# Patient Record
Sex: MALE | Race: WHITE | Employment: OTHER | URBAN - METROPOLITAN AREA
[De-identification: names, ages, dates, MRNs, and addresses within clinical notes are randomized per-mention and may not be internally consistent; named-entity substitution may affect disease eponyms.]

---

## 2017-04-24 ENCOUNTER — OFFICE VISIT (OUTPATIENT)
Dept: FAMILY MEDICINE CLINIC | Age: 82
End: 2017-04-24

## 2017-04-24 VITALS
BODY MASS INDEX: 31.76 KG/M2 | RESPIRATION RATE: 16 BRPM | WEIGHT: 202.8 LBS | SYSTOLIC BLOOD PRESSURE: 110 MMHG | DIASTOLIC BLOOD PRESSURE: 80 MMHG | TEMPERATURE: 98 F | HEART RATE: 76 BPM

## 2017-04-24 DIAGNOSIS — D69.6 THROMBOCYTOPENIA (HCC): ICD-10-CM

## 2017-04-24 DIAGNOSIS — G47.30 SLEEP APNEA, UNSPECIFIED TYPE: ICD-10-CM

## 2017-04-24 DIAGNOSIS — E03.9 HYPOTHYROIDISM, UNSPECIFIED TYPE: Chronic | ICD-10-CM

## 2017-04-24 DIAGNOSIS — I25.10 CORONARY ARTERY DISEASE INVOLVING NATIVE CORONARY ARTERY OF NATIVE HEART WITHOUT ANGINA PECTORIS: Chronic | ICD-10-CM

## 2017-04-24 DIAGNOSIS — E34.9 TESTOSTERONE DEFICIENCY: ICD-10-CM

## 2017-04-24 DIAGNOSIS — K21.9 GASTROESOPHAGEAL REFLUX DISEASE, ESOPHAGITIS PRESENCE NOT SPECIFIED: ICD-10-CM

## 2017-04-24 DIAGNOSIS — M47.815 OSTEOARTHRITIS OF THORACOLUMBAR SPINE, UNSPECIFIED SPINAL OSTEOARTHRITIS COMPLICATION STATUS: ICD-10-CM

## 2017-04-24 DIAGNOSIS — I10 ESSENTIAL HYPERTENSION: Primary | ICD-10-CM

## 2017-04-24 DIAGNOSIS — R05.9 COUGH: ICD-10-CM

## 2017-04-24 DIAGNOSIS — R93.89 ABNORMAL CHEST CT: ICD-10-CM

## 2017-04-24 DIAGNOSIS — E78.5 HYPERLIPIDEMIA, UNSPECIFIED HYPERLIPIDEMIA TYPE: ICD-10-CM

## 2017-04-24 PROCEDURE — 99214 OFFICE O/P EST MOD 30 MIN: CPT | Performed by: FAMILY MEDICINE

## 2017-04-24 PROCEDURE — 4040F PNEUMOC VAC/ADMIN/RCVD: CPT | Performed by: FAMILY MEDICINE

## 2017-04-24 PROCEDURE — 1123F ACP DISCUSS/DSCN MKR DOCD: CPT | Performed by: FAMILY MEDICINE

## 2017-04-24 PROCEDURE — 1036F TOBACCO NON-USER: CPT | Performed by: FAMILY MEDICINE

## 2017-04-24 PROCEDURE — G8427 DOCREV CUR MEDS BY ELIG CLIN: HCPCS | Performed by: FAMILY MEDICINE

## 2017-04-24 PROCEDURE — G8417 CALC BMI ABV UP PARAM F/U: HCPCS | Performed by: FAMILY MEDICINE

## 2017-04-24 PROCEDURE — G8598 ASA/ANTIPLAT THER USED: HCPCS | Performed by: FAMILY MEDICINE

## 2017-04-24 RX ORDER — FUROSEMIDE 40 MG/1
20 TABLET ORAL DAILY
COMMUNITY
End: 2019-09-12 | Stop reason: DRUGHIGH

## 2017-04-24 RX ORDER — POTASSIUM CHLORIDE 7.45 MG/ML
10 INJECTION INTRAVENOUS ONCE
COMMUNITY
End: 2020-07-20

## 2017-04-24 ASSESSMENT — ENCOUNTER SYMPTOMS
SHORTNESS OF BREATH: 0
VOMITING: 0
CHEST TIGHTNESS: 0
ANAL BLEEDING: 0
CONSTIPATION: 0
DIARRHEA: 0
NAUSEA: 0
ABDOMINAL PAIN: 0
BLOOD IN STOOL: 0

## 2017-04-25 ENCOUNTER — TELEPHONE (OUTPATIENT)
Dept: FAMILY MEDICINE CLINIC | Age: 82
End: 2017-04-25

## 2017-05-23 DIAGNOSIS — M47.815 OSTEOARTHRITIS OF THORACOLUMBAR SPINE, UNSPECIFIED SPINAL OSTEOARTHRITIS COMPLICATION STATUS: ICD-10-CM

## 2017-05-23 RX ORDER — NAPROXEN 500 MG/1
500 TABLET ORAL DAILY
Qty: 90 TABLET | Refills: 3 | Status: SHIPPED | OUTPATIENT
Start: 2017-05-23 | End: 2018-04-13 | Stop reason: SDUPTHER

## 2017-08-09 ENCOUNTER — TELEPHONE (OUTPATIENT)
Dept: FAMILY MEDICINE CLINIC | Age: 82
End: 2017-08-09

## 2017-09-01 ENCOUNTER — OFFICE VISIT (OUTPATIENT)
Dept: FAMILY MEDICINE CLINIC | Age: 82
End: 2017-09-01
Payer: MEDICARE

## 2017-09-01 VITALS
RESPIRATION RATE: 14 BRPM | TEMPERATURE: 97.8 F | DIASTOLIC BLOOD PRESSURE: 72 MMHG | SYSTOLIC BLOOD PRESSURE: 128 MMHG | HEART RATE: 60 BPM | WEIGHT: 208.2 LBS | BODY MASS INDEX: 32.68 KG/M2 | HEIGHT: 67 IN

## 2017-09-01 DIAGNOSIS — E03.9 HYPOTHYROIDISM, UNSPECIFIED TYPE: Chronic | ICD-10-CM

## 2017-09-01 DIAGNOSIS — E34.9 TESTOSTERONE DEFICIENCY: ICD-10-CM

## 2017-09-01 DIAGNOSIS — E78.5 HYPERLIPIDEMIA, UNSPECIFIED HYPERLIPIDEMIA TYPE: ICD-10-CM

## 2017-09-01 DIAGNOSIS — I10 ESSENTIAL HYPERTENSION: Primary | ICD-10-CM

## 2017-09-01 DIAGNOSIS — K21.9 GASTROESOPHAGEAL REFLUX DISEASE, ESOPHAGITIS PRESENCE NOT SPECIFIED: ICD-10-CM

## 2017-09-01 DIAGNOSIS — R91.8 MASS OF LOWER LOBE OF LEFT LUNG: ICD-10-CM

## 2017-09-01 DIAGNOSIS — I25.10 CORONARY ARTERY DISEASE INVOLVING NATIVE CORONARY ARTERY OF NATIVE HEART WITHOUT ANGINA PECTORIS: Chronic | ICD-10-CM

## 2017-09-01 PROCEDURE — G8417 CALC BMI ABV UP PARAM F/U: HCPCS | Performed by: FAMILY MEDICINE

## 2017-09-01 PROCEDURE — G8427 DOCREV CUR MEDS BY ELIG CLIN: HCPCS | Performed by: FAMILY MEDICINE

## 2017-09-01 PROCEDURE — 99214 OFFICE O/P EST MOD 30 MIN: CPT | Performed by: FAMILY MEDICINE

## 2017-09-01 PROCEDURE — 4040F PNEUMOC VAC/ADMIN/RCVD: CPT | Performed by: FAMILY MEDICINE

## 2017-09-01 PROCEDURE — 1036F TOBACCO NON-USER: CPT | Performed by: FAMILY MEDICINE

## 2017-09-01 PROCEDURE — 1123F ACP DISCUSS/DSCN MKR DOCD: CPT | Performed by: FAMILY MEDICINE

## 2017-09-01 PROCEDURE — G8598 ASA/ANTIPLAT THER USED: HCPCS | Performed by: FAMILY MEDICINE

## 2017-09-01 RX ORDER — OMEPRAZOLE 20 MG/1
20 CAPSULE, DELAYED RELEASE ORAL DAILY
Qty: 90 CAPSULE | Refills: 3 | Status: CANCELLED | OUTPATIENT
Start: 2017-09-01

## 2017-09-01 RX ORDER — ATORVASTATIN CALCIUM 80 MG/1
80 TABLET, FILM COATED ORAL DAILY
COMMUNITY

## 2017-09-01 RX ORDER — LOSARTAN POTASSIUM 50 MG/1
50 TABLET ORAL DAILY
Qty: 90 TABLET | Refills: 3 | Status: CANCELLED | OUTPATIENT
Start: 2017-09-01

## 2017-09-01 ASSESSMENT — ENCOUNTER SYMPTOMS
CONSTIPATION: 0
ABDOMINAL PAIN: 0
VOMITING: 0
ANAL BLEEDING: 0
SHORTNESS OF BREATH: 0
BLOOD IN STOOL: 0
DIARRHEA: 0
CHEST TIGHTNESS: 0
NAUSEA: 0

## 2017-09-06 ENCOUNTER — HOSPITAL ENCOUNTER (OUTPATIENT)
Dept: CT IMAGING | Age: 82
Discharge: HOME | End: 2017-09-06
Payer: MEDICARE

## 2017-09-06 DIAGNOSIS — R91.8 MASS OF LOWER LOBE OF LEFT LUNG: ICD-10-CM

## 2017-09-06 PROCEDURE — 71250 CT THORAX DX C-: CPT

## 2017-09-07 ENCOUNTER — TELEPHONE (OUTPATIENT)
Dept: FAMILY MEDICINE CLINIC | Age: 82
End: 2017-09-07

## 2017-09-07 DIAGNOSIS — R91.8 MASS OF LOWER LOBE OF LEFT LUNG: Primary | ICD-10-CM

## 2017-11-17 ENCOUNTER — HOSPITAL ENCOUNTER (OUTPATIENT)
Dept: CT IMAGING | Age: 82
Discharge: HOME | End: 2017-11-17
Payer: MEDICARE

## 2017-11-17 DIAGNOSIS — R91.8 MASS OF LOWER LOBE OF LEFT LUNG: ICD-10-CM

## 2017-11-17 PROCEDURE — 71250 CT THORAX DX C-: CPT

## 2018-04-13 ENCOUNTER — OFFICE VISIT (OUTPATIENT)
Dept: FAMILY MEDICINE CLINIC | Age: 83
End: 2018-04-13
Payer: MEDICARE

## 2018-04-13 VITALS
DIASTOLIC BLOOD PRESSURE: 80 MMHG | HEART RATE: 52 BPM | BODY MASS INDEX: 30.48 KG/M2 | SYSTOLIC BLOOD PRESSURE: 106 MMHG | TEMPERATURE: 98.3 F | WEIGHT: 194.6 LBS | RESPIRATION RATE: 16 BRPM

## 2018-04-13 DIAGNOSIS — E78.5 HYPERLIPIDEMIA, UNSPECIFIED HYPERLIPIDEMIA TYPE: ICD-10-CM

## 2018-04-13 DIAGNOSIS — R80.9 PROTEINURIA, UNSPECIFIED TYPE: ICD-10-CM

## 2018-04-13 DIAGNOSIS — K21.9 GASTROESOPHAGEAL REFLUX DISEASE, ESOPHAGITIS PRESENCE NOT SPECIFIED: ICD-10-CM

## 2018-04-13 DIAGNOSIS — E03.9 HYPOTHYROIDISM, UNSPECIFIED TYPE: Chronic | ICD-10-CM

## 2018-04-13 DIAGNOSIS — J44.9 CHRONIC OBSTRUCTIVE PULMONARY DISEASE, UNSPECIFIED COPD TYPE (HCC): ICD-10-CM

## 2018-04-13 DIAGNOSIS — I10 ESSENTIAL HYPERTENSION: Primary | ICD-10-CM

## 2018-04-13 DIAGNOSIS — E34.9 TESTOSTERONE DEFICIENCY: ICD-10-CM

## 2018-04-13 DIAGNOSIS — I25.10 CORONARY ARTERY DISEASE INVOLVING NATIVE CORONARY ARTERY OF NATIVE HEART WITHOUT ANGINA PECTORIS: Chronic | ICD-10-CM

## 2018-04-13 DIAGNOSIS — M47.815 OSTEOARTHRITIS OF THORACOLUMBAR SPINE, UNSPECIFIED SPINAL OSTEOARTHRITIS COMPLICATION STATUS: ICD-10-CM

## 2018-04-13 PROCEDURE — 1123F ACP DISCUSS/DSCN MKR DOCD: CPT | Performed by: FAMILY MEDICINE

## 2018-04-13 PROCEDURE — 1036F TOBACCO NON-USER: CPT | Performed by: FAMILY MEDICINE

## 2018-04-13 PROCEDURE — 4040F PNEUMOC VAC/ADMIN/RCVD: CPT | Performed by: FAMILY MEDICINE

## 2018-04-13 PROCEDURE — 99214 OFFICE O/P EST MOD 30 MIN: CPT | Performed by: FAMILY MEDICINE

## 2018-04-13 PROCEDURE — G8417 CALC BMI ABV UP PARAM F/U: HCPCS | Performed by: FAMILY MEDICINE

## 2018-04-13 PROCEDURE — G8926 SPIRO NO PERF OR DOC: HCPCS | Performed by: FAMILY MEDICINE

## 2018-04-13 PROCEDURE — G8427 DOCREV CUR MEDS BY ELIG CLIN: HCPCS | Performed by: FAMILY MEDICINE

## 2018-04-13 PROCEDURE — 3023F SPIROM DOC REV: CPT | Performed by: FAMILY MEDICINE

## 2018-04-13 PROCEDURE — G8598 ASA/ANTIPLAT THER USED: HCPCS | Performed by: FAMILY MEDICINE

## 2018-04-13 RX ORDER — NAPROXEN 500 MG/1
500 TABLET ORAL DAILY
Qty: 90 TABLET | Refills: 3 | Status: SHIPPED | OUTPATIENT
Start: 2018-04-13 | End: 2019-07-25 | Stop reason: SDUPTHER

## 2018-04-13 RX ORDER — LANOLIN ALCOHOL/MO/W.PET/CERES
1000 CREAM (GRAM) TOPICAL EVERY OTHER DAY
COMMUNITY

## 2018-04-13 RX ORDER — ALBUTEROL SULFATE 90 UG/1
2 AEROSOL, METERED RESPIRATORY (INHALATION) EVERY 6 HOURS PRN
COMMUNITY

## 2018-04-13 ASSESSMENT — ENCOUNTER SYMPTOMS
SHORTNESS OF BREATH: 1
ANAL BLEEDING: 0
CONSTIPATION: 0
CHEST TIGHTNESS: 0
ABDOMINAL PAIN: 0
NAUSEA: 0
VOMITING: 0
BLOOD IN STOOL: 0
DIARRHEA: 0

## 2018-04-26 ENCOUNTER — HOSPITAL ENCOUNTER (OUTPATIENT)
Dept: PULMONOLOGY | Age: 83
Discharge: HOME | End: 2018-04-26
Payer: MEDICARE

## 2018-04-26 DIAGNOSIS — J44.9 CHRONIC OBSTRUCTIVE PULMONARY DISEASE, UNSPECIFIED COPD TYPE (HCC): ICD-10-CM

## 2018-04-26 PROCEDURE — 94729 DIFFUSING CAPACITY: CPT

## 2018-04-26 PROCEDURE — 94726 PLETHYSMOGRAPHY LUNG VOLUMES: CPT

## 2018-04-26 PROCEDURE — 94060 EVALUATION OF WHEEZING: CPT

## 2018-05-16 ENCOUNTER — TELEPHONE (OUTPATIENT)
Dept: FAMILY MEDICINE CLINIC | Age: 83
End: 2018-05-16

## 2018-05-31 ENCOUNTER — TELEPHONE (OUTPATIENT)
Dept: FAMILY MEDICINE CLINIC | Age: 83
End: 2018-05-31

## 2018-06-15 ENCOUNTER — OFFICE VISIT (OUTPATIENT)
Dept: PULMONOLOGY | Age: 83
End: 2018-06-15
Payer: MEDICARE

## 2018-06-15 VITALS
TEMPERATURE: 97.7 F | SYSTOLIC BLOOD PRESSURE: 124 MMHG | WEIGHT: 197.8 LBS | BODY MASS INDEX: 31.04 KG/M2 | DIASTOLIC BLOOD PRESSURE: 78 MMHG | HEIGHT: 67 IN | HEART RATE: 70 BPM | OXYGEN SATURATION: 96 %

## 2018-06-15 DIAGNOSIS — Z99.89 OSA ON CPAP: ICD-10-CM

## 2018-06-15 DIAGNOSIS — G47.33 OSA ON CPAP: ICD-10-CM

## 2018-06-15 DIAGNOSIS — J44.9 CHRONIC OBSTRUCTIVE PULMONARY DISEASE, UNSPECIFIED COPD TYPE (HCC): Primary | ICD-10-CM

## 2018-06-15 PROCEDURE — 1036F TOBACCO NON-USER: CPT | Performed by: INTERNAL MEDICINE

## 2018-06-15 PROCEDURE — G8926 SPIRO NO PERF OR DOC: HCPCS | Performed by: INTERNAL MEDICINE

## 2018-06-15 PROCEDURE — 3023F SPIROM DOC REV: CPT | Performed by: INTERNAL MEDICINE

## 2018-06-15 PROCEDURE — G8598 ASA/ANTIPLAT THER USED: HCPCS | Performed by: INTERNAL MEDICINE

## 2018-06-15 PROCEDURE — G8427 DOCREV CUR MEDS BY ELIG CLIN: HCPCS | Performed by: INTERNAL MEDICINE

## 2018-06-15 PROCEDURE — G8417 CALC BMI ABV UP PARAM F/U: HCPCS | Performed by: INTERNAL MEDICINE

## 2018-06-15 PROCEDURE — 99204 OFFICE O/P NEW MOD 45 MIN: CPT | Performed by: INTERNAL MEDICINE

## 2018-06-15 PROCEDURE — 1123F ACP DISCUSS/DSCN MKR DOCD: CPT | Performed by: INTERNAL MEDICINE

## 2018-06-15 PROCEDURE — 4040F PNEUMOC VAC/ADMIN/RCVD: CPT | Performed by: INTERNAL MEDICINE

## 2018-06-15 RX ORDER — IPRATROPIUM BROMIDE AND ALBUTEROL SULFATE 2.5; .5 MG/3ML; MG/3ML
1 SOLUTION RESPIRATORY (INHALATION) EVERY 4 HOURS PRN
COMMUNITY

## 2018-06-15 RX ORDER — BUDESONIDE AND FORMOTEROL FUMARATE DIHYDRATE 160; 4.5 UG/1; UG/1
2 AEROSOL RESPIRATORY (INHALATION) 2 TIMES DAILY
Qty: 1 INHALER | Refills: 3 | Status: SHIPPED | OUTPATIENT
Start: 2018-06-15

## 2018-06-15 ASSESSMENT — ENCOUNTER SYMPTOMS
COLOR CHANGE: 0
SHORTNESS OF BREATH: 1
WHEEZING: 0
VOICE CHANGE: 0
COUGH: 1
BACK PAIN: 1
TROUBLE SWALLOWING: 0
ABDOMINAL PAIN: 0

## 2018-07-27 ENCOUNTER — HOSPITAL ENCOUNTER (OUTPATIENT)
Age: 83
Discharge: HOME | End: 2018-07-27
Payer: MEDICARE

## 2018-07-27 DIAGNOSIS — E78.5 HYPERLIPIDEMIA, UNSPECIFIED HYPERLIPIDEMIA TYPE: ICD-10-CM

## 2018-07-27 DIAGNOSIS — I10 ESSENTIAL HYPERTENSION: ICD-10-CM

## 2018-07-27 LAB
ALBUMIN SERPL-MCNC: 3.9 G/DL (ref 3.5–5.1)
ALP BLD-CCNC: 46 U/L (ref 38–126)
ALT SERPL-CCNC: 21 U/L (ref 11–66)
ANION GAP SERPL CALCULATED.3IONS-SCNC: 13 MEQ/L (ref 8–16)
AST SERPL-CCNC: 24 U/L (ref 5–40)
BILIRUB SERPL-MCNC: 0.7 MG/DL (ref 0.3–1.2)
BUN BLDV-MCNC: 16 MG/DL (ref 7–22)
CALCIUM SERPL-MCNC: 9.2 MG/DL (ref 8.5–10.5)
CHLORIDE BLD-SCNC: 104 MEQ/L (ref 98–111)
CHOLESTEROL, TOTAL: 110 MG/DL (ref 100–199)
CO2: 26 MEQ/L (ref 23–33)
CREAT SERPL-MCNC: 1 MG/DL (ref 0.4–1.2)
GFR SERPL CREATININE-BSD FRML MDRD: 72 ML/MIN/1.73M2
GLUCOSE BLD-MCNC: 87 MG/DL (ref 70–108)
HDLC SERPL-MCNC: 29 MG/DL
LDL CHOLESTEROL CALCULATED: 69 MG/DL
POTASSIUM SERPL-SCNC: 4.1 MEQ/L (ref 3.5–5.2)
SODIUM BLD-SCNC: 143 MEQ/L (ref 135–145)
TOTAL PROTEIN: 6.7 G/DL (ref 6.1–8)
TRIGL SERPL-MCNC: 58 MG/DL (ref 0–199)

## 2018-07-27 PROCEDURE — 80061 LIPID PANEL: CPT

## 2018-07-27 PROCEDURE — 36415 COLL VENOUS BLD VENIPUNCTURE: CPT

## 2018-07-27 PROCEDURE — 80053 COMPREHEN METABOLIC PANEL: CPT

## 2018-07-30 ENCOUNTER — OFFICE VISIT (OUTPATIENT)
Dept: FAMILY MEDICINE CLINIC | Age: 83
End: 2018-07-30
Payer: MEDICARE

## 2018-07-30 VITALS
BODY MASS INDEX: 31.48 KG/M2 | HEART RATE: 64 BPM | WEIGHT: 201 LBS | TEMPERATURE: 98.1 F | DIASTOLIC BLOOD PRESSURE: 70 MMHG | RESPIRATION RATE: 20 BRPM | SYSTOLIC BLOOD PRESSURE: 118 MMHG

## 2018-07-30 DIAGNOSIS — I10 ESSENTIAL HYPERTENSION: Primary | ICD-10-CM

## 2018-07-30 DIAGNOSIS — E03.9 HYPOTHYROIDISM, UNSPECIFIED TYPE: Chronic | ICD-10-CM

## 2018-07-30 DIAGNOSIS — E78.5 HYPERLIPIDEMIA, UNSPECIFIED HYPERLIPIDEMIA TYPE: ICD-10-CM

## 2018-07-30 DIAGNOSIS — I25.10 CORONARY ARTERY DISEASE INVOLVING NATIVE CORONARY ARTERY OF NATIVE HEART WITHOUT ANGINA PECTORIS: Chronic | ICD-10-CM

## 2018-07-30 DIAGNOSIS — K21.9 GASTROESOPHAGEAL REFLUX DISEASE, ESOPHAGITIS PRESENCE NOT SPECIFIED: ICD-10-CM

## 2018-07-30 DIAGNOSIS — E34.9 TESTOSTERONE DEFICIENCY: ICD-10-CM

## 2018-07-30 PROCEDURE — 99214 OFFICE O/P EST MOD 30 MIN: CPT | Performed by: FAMILY MEDICINE

## 2018-07-30 PROCEDURE — G8598 ASA/ANTIPLAT THER USED: HCPCS | Performed by: FAMILY MEDICINE

## 2018-07-30 PROCEDURE — 1101F PT FALLS ASSESS-DOCD LE1/YR: CPT | Performed by: FAMILY MEDICINE

## 2018-07-30 PROCEDURE — 1123F ACP DISCUSS/DSCN MKR DOCD: CPT | Performed by: FAMILY MEDICINE

## 2018-07-30 PROCEDURE — G8417 CALC BMI ABV UP PARAM F/U: HCPCS | Performed by: FAMILY MEDICINE

## 2018-07-30 PROCEDURE — 4040F PNEUMOC VAC/ADMIN/RCVD: CPT | Performed by: FAMILY MEDICINE

## 2018-07-30 PROCEDURE — G8427 DOCREV CUR MEDS BY ELIG CLIN: HCPCS | Performed by: FAMILY MEDICINE

## 2018-07-30 PROCEDURE — 1036F TOBACCO NON-USER: CPT | Performed by: FAMILY MEDICINE

## 2018-07-30 RX ORDER — ACETAMINOPHEN 160 MG
1 TABLET,DISINTEGRATING ORAL DAILY
COMMUNITY

## 2018-07-30 ASSESSMENT — ENCOUNTER SYMPTOMS
CHEST TIGHTNESS: 0
ABDOMINAL PAIN: 0
ANAL BLEEDING: 0
BLOOD IN STOOL: 0
VOMITING: 0
NAUSEA: 0
SHORTNESS OF BREATH: 0
DIARRHEA: 0
CONSTIPATION: 0

## 2018-07-30 ASSESSMENT — PATIENT HEALTH QUESTIONNAIRE - PHQ9
2. FEELING DOWN, DEPRESSED OR HOPELESS: 0
1. LITTLE INTEREST OR PLEASURE IN DOING THINGS: 0
SUM OF ALL RESPONSES TO PHQ9 QUESTIONS 1 & 2: 0
SUM OF ALL RESPONSES TO PHQ QUESTIONS 1-9: 0

## 2018-07-30 NOTE — PROGRESS NOTES
exudate. Eyes: Conjunctivae and EOM are normal. Pupils are equal, round, and reactive to light. Right eye exhibits no discharge. Left eye exhibits no discharge. No scleral icterus. Neck: Normal range of motion. Neck supple. No tracheal deviation present. No thyromegaly present. Cardiovascular: Normal rate, regular rhythm, normal heart sounds and intact distal pulses. Exam reveals no gallop and no friction rub. No murmur heard. Pulmonary/Chest: Effort normal and breath sounds normal. No respiratory distress. He has no wheezes. He has no rales. He exhibits no tenderness. Abdominal: Soft. Bowel sounds are normal. He exhibits no distension and no mass. There is no tenderness. There is no rebound and no guarding. Genitourinary: Testes normal.   Musculoskeletal: Normal range of motion. Lymphadenopathy:     He has no cervical adenopathy. Neurological: He is alert and oriented to person, place, and time. He displays normal reflexes. No cranial nerve deficit. Coordination normal.   Skin: Skin is warm and dry. No rash noted. He is not diaphoretic. No erythema. No pallor. Psychiatric: He has a normal mood and affect. His behavior is normal. Judgment and thought content normal.   Nursing note and vitals reviewed.       Component      Latest Ref Rng & Units 7/27/2018   Glucose      70 - 108 mg/dL 87   Creatinine      0.4 - 1.2 mg/dL 1.0   BUN      7 - 22 mg/dL 16   Sodium      135 - 145 meq/L 143   Potassium      3.5 - 5.2 meq/L 4.1   Chloride      98 - 111 meq/L 104   CO2      23 - 33 meq/L 26   Calcium      8.5 - 10.5 mg/dL 9.2   AST      5 - 40 U/L 24   Alk Phos      38 - 126 U/L 46   Total Protein      6.1 - 8.0 g/dL 6.7   Albumin      3.5 - 5.1 g/dL 3.9   Bilirubin      0.3 - 1.2 mg/dL 0.7   ALT      11 - 66 U/L 21   Cholesterol, Total      100 - 199 mg/dL 110   Triglycerides      0 - 199 mg/dL 58   HDL Cholesterol      mg/dL 29   LDL Calculated      mg/dL 69   Anion Gap      8.0 - 16.0 meq/L 13.0   Est, Glom Filt Rate      ml/min/1.73m2 72 (A)         Lab Results   Component Value Date    TSH 0.04 (L) 08/06/2014    R0OBXEW 142.41 08/06/2014    T4FREE 1.16 (H) 10/02/2015       Lab Results   Component Value Date    WBC 6.6 10/31/2016    HGB 14.8 10/31/2016    HCT 43.6 10/31/2016    MCV 89.8 10/31/2016     10/31/2016         Immunization History   Administered Date(s) Administered    Influenza Virus Vaccine 11/01/2011, 10/01/2012, 10/01/2013, 09/23/2014, 08/26/2015, 10/01/2015, 10/11/2017    Influenza Whole 10/01/2010    Influenza, [place], 3 Years and older, IM 10/13/2016    Pneumococcal 13-valent Conjugate ([id number]) 05/18/2015    Pneumococcal Polysaccharide ([id number]) 10/01/2005    Td 07/20/2012    Tdap (Boostrix, Adacel) 10/13/2016, 04/19/2017    Zoster Live (Zostavax) 04/30/2012       Health Maintenance   Topic Date Due    Shingles Vaccine (1 of 2 - 2 Dose Series) 06/30/2012    TSH testing  08/06/2015    Flu vaccine (1) 09/01/2018    Potassium monitoring  07/27/2019    Creatinine monitoring  07/27/2019    DTaP/Tdap/Td vaccine (2 - Td) 04/19/2027    Pneumococcal low/med risk  Completed       AAA ultrasound (Male, 65-75, smoked ever) indicated at this time? NO  CT Lung Screen (55-80, 30 pk-yrs, smoking or quit <15 years) indicated at this time? No longer needed      Future Appointments  Date Time Provider Glen Broussard   10/1/2018 2:00 PM Onel Galvan, [address]       ASSESSMENT       Diagnosis Orders   1. Essential hypertension     2. Hyperlipidemia, unspecified hyperlipidemia type     3. Coronary artery disease involving native coronary artery of native heart without angina pectoris- (Dr. Shital Araujo)     4. Hypothyroidism, unspecified type     5. Testosterone deficiency (Dr. Abram Alvarez)      6.  Gastroesophageal reflux disease, esophagitis presence not specified         PLAN      Encouraged annual FLU VACCINE after October 1st.    Encouraged NEW SHINGLES SHOT [facility]) - check with insurance re coverage and location of administration (updated 7/30/18)  COLONOSCOPY done 10/17/2014 per Dr. Elijio Jeans- no further mandated. (updated 7/30/2018)  Declines FIT testing at this time (updated 7/30/2018)  DEXA management per [facility]- done 6/17/2016- severe Osteopenia- Follow up October 2018  PSA on hold at this time due to age and fact that PSA has always been very low in past. (updated 7/30/2018)   Evaluated by Dr Cl Biggs on 6/1/18, inhalers added. Follow up with the office 10/1/2018. Restart Calcium Citrate- 1 pill daily as your Fosamax needs this to work correctly. Continue Vitamin D supplementation. [facility] Macy [facility] re Testosterone and Thyroid- [facility]. 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 Roxanne Aldana in October 2018  Continue current medicines. Please discuss need for any further CT's of Lung (due to stable left lung mass) with Pulmonology at your next visit in 10/2018  No refills needed. Follow up in 5/2019 after return form [place].       Electronically signed by Cheyanne Encinas MD on 7/30/2018 at 12:19 PM

## 2018-10-25 PROBLEM — J44.9 COPD (CHRONIC OBSTRUCTIVE PULMONARY DISEASE) (HCC): Status: ACTIVE | Noted: 2017-12-01

## 2018-11-29 ENCOUNTER — HOSPITAL ENCOUNTER (OUTPATIENT)
Dept: GENERAL RADIOLOGY | Age: 83
Discharge: HOME | End: 2018-11-29
Payer: MEDICARE

## 2018-11-29 ENCOUNTER — HOSPITAL ENCOUNTER (OUTPATIENT)
Age: 83
Discharge: HOME | End: 2018-11-29
Payer: MEDICARE

## 2018-11-29 DIAGNOSIS — Z01.811 PRE-OP CHEST EXAM: ICD-10-CM

## 2018-11-29 LAB
ANION GAP SERPL CALCULATED.3IONS-SCNC: 11 MEQ/L (ref 8–16)
BUN BLDV-MCNC: 14 MG/DL (ref 7–22)
CALCIUM SERPL-MCNC: 9.5 MG/DL (ref 8.5–10.5)
CHLORIDE BLD-SCNC: 102 MEQ/L (ref 98–111)
CO2: 30 MEQ/L (ref 23–33)
CREAT SERPL-MCNC: 0.9 MG/DL (ref 0.4–1.2)
EKG ATRIAL RATE: 60 BPM
EKG P AXIS: 48 DEGREES
EKG P-R INTERVAL: 174 MS
EKG Q-T INTERVAL: 420 MS
EKG QRS DURATION: 128 MS
EKG QTC CALCULATION (BAZETT): 420 MS
EKG R AXIS: -16 DEGREES
EKG VENTRICULAR RATE: 60 BPM
ERYTHROCYTE [DISTWIDTH] IN BLOOD BY AUTOMATED COUNT: 12.3 % (ref 11.5–14.5)
ERYTHROCYTE [DISTWIDTH] IN BLOOD BY AUTOMATED COUNT: 42.5 FL (ref 35–45)
GFR SERPL CREATININE-BSD FRML MDRD: 81 ML/MIN/1.73M2
GLUCOSE BLD-MCNC: 75 MG/DL (ref 70–108)
HCT VFR BLD CALC: 41.9 % (ref 42–52)
HEMOGLOBIN: 13.9 GM/DL (ref 14–18)
MCH RBC QN AUTO: 31.4 PG (ref 26–33)
MCHC RBC AUTO-ENTMCNC: 33.2 GM/DL (ref 32.2–35.5)
MCV RBC AUTO: 94.6 FL (ref 80–94)
PLATELET # BLD: 159 THOU/MM3 (ref 130–400)
PMV BLD AUTO: 11 FL (ref 9.4–12.4)
POTASSIUM SERPL-SCNC: 3.9 MEQ/L (ref 3.5–5.2)
RBC # BLD: 4.43 MILL/MM3 (ref 4.7–6.1)
SODIUM BLD-SCNC: 143 MEQ/L (ref 135–145)
WBC # BLD: 5.9 THOU/MM3 (ref 4.8–10.8)

## 2018-11-29 PROCEDURE — 36415 COLL VENOUS BLD VENIPUNCTURE: CPT

## 2018-11-29 PROCEDURE — 71046 X-RAY EXAM CHEST 2 VIEWS: CPT

## 2018-11-29 PROCEDURE — 93010 ELECTROCARDIOGRAM REPORT: CPT | Performed by: INTERNAL MEDICINE

## 2018-11-29 PROCEDURE — 93005 ELECTROCARDIOGRAM TRACING: CPT | Performed by: PHYSICIAN ASSISTANT

## 2018-11-29 PROCEDURE — 80048 BASIC METABOLIC PNL TOTAL CA: CPT

## 2018-11-29 PROCEDURE — 85027 COMPLETE CBC AUTOMATED: CPT

## 2018-12-13 ENCOUNTER — ANESTHESIA (OUTPATIENT)
Dept: OPERATING ROOM | Age: 83
End: 2018-12-13
Payer: MEDICARE

## 2018-12-13 ENCOUNTER — HOSPITAL ENCOUNTER (OUTPATIENT)
Age: 83
Setting detail: OUTPATIENT SURGERY
Discharge: HOME | End: 2018-12-13
Attending: SPECIALIST | Admitting: SPECIALIST
Payer: MEDICARE

## 2018-12-13 ENCOUNTER — ANESTHESIA EVENT (OUTPATIENT)
Dept: OPERATING ROOM | Age: 83
End: 2018-12-13
Payer: MEDICARE

## 2018-12-13 VITALS
RESPIRATION RATE: 15 BRPM | SYSTOLIC BLOOD PRESSURE: 108 MMHG | DIASTOLIC BLOOD PRESSURE: 58 MMHG | OXYGEN SATURATION: 93 %

## 2018-12-13 VITALS
WEIGHT: 196 LBS | RESPIRATION RATE: 16 BRPM | TEMPERATURE: 97.5 F | DIASTOLIC BLOOD PRESSURE: 69 MMHG | SYSTOLIC BLOOD PRESSURE: 151 MMHG | BODY MASS INDEX: 30.76 KG/M2 | HEART RATE: 51 BPM | OXYGEN SATURATION: 94 % | HEIGHT: 67 IN

## 2018-12-13 PROCEDURE — 2500000003 HC RX 250 WO HCPCS: Performed by: SPECIALIST

## 2018-12-13 PROCEDURE — 6360000002 HC RX W HCPCS: Performed by: NURSE ANESTHETIST, CERTIFIED REGISTERED

## 2018-12-13 PROCEDURE — 7100000010 HC PHASE II RECOVERY - FIRST 15 MIN: Performed by: SPECIALIST

## 2018-12-13 PROCEDURE — 3600000002 HC SURGERY LEVEL 2 BASE: Performed by: SPECIALIST

## 2018-12-13 PROCEDURE — 3700000000 HC ANESTHESIA ATTENDED CARE: Performed by: SPECIALIST

## 2018-12-13 PROCEDURE — 2720000010 HC SURG SUPPLY STERILE: Performed by: SPECIALIST

## 2018-12-13 PROCEDURE — 2709999900 HC NON-CHARGEABLE SUPPLY: Performed by: SPECIALIST

## 2018-12-13 PROCEDURE — 3600000012 HC SURGERY LEVEL 2 ADDTL 15MIN: Performed by: SPECIALIST

## 2018-12-13 PROCEDURE — 2580000003 HC RX 258: Performed by: NURSE ANESTHETIST, CERTIFIED REGISTERED

## 2018-12-13 PROCEDURE — 7100000011 HC PHASE II RECOVERY - ADDTL 15 MIN: Performed by: SPECIALIST

## 2018-12-13 PROCEDURE — 3700000001 HC ADD 15 MINUTES (ANESTHESIA): Performed by: SPECIALIST

## 2018-12-13 RX ORDER — SODIUM CHLORIDE 9 MG/ML
INJECTION, SOLUTION INTRAVENOUS CONTINUOUS PRN
Status: DISCONTINUED | OUTPATIENT
Start: 2018-12-13 | End: 2018-12-13 | Stop reason: SDUPTHER

## 2018-12-13 RX ORDER — CEFAZOLIN SODIUM 1 G/50ML
1 INJECTION, SOLUTION INTRAVENOUS
Status: DISCONTINUED | OUTPATIENT
Start: 2018-12-13 | End: 2018-12-13 | Stop reason: HOSPADM

## 2018-12-13 RX ORDER — SODIUM CHLORIDE 9 MG/ML
INJECTION, SOLUTION INTRAVENOUS CONTINUOUS
Status: DISCONTINUED | OUTPATIENT
Start: 2018-12-13 | End: 2018-12-13 | Stop reason: HOSPADM

## 2018-12-13 RX ORDER — PROPOFOL 10 MG/ML
INJECTION, EMULSION INTRAVENOUS PRN
Status: DISCONTINUED | OUTPATIENT
Start: 2018-12-13 | End: 2018-12-13 | Stop reason: SDUPTHER

## 2018-12-13 RX ORDER — LIDOCAINE HYDROCHLORIDE AND EPINEPHRINE 10; 10 MG/ML; UG/ML
INJECTION, SOLUTION INFILTRATION; PERINEURAL PRN
Status: DISCONTINUED | OUTPATIENT
Start: 2018-12-13 | End: 2018-12-13 | Stop reason: HOSPADM

## 2018-12-13 RX ORDER — FENTANYL CITRATE 50 UG/ML
INJECTION, SOLUTION INTRAMUSCULAR; INTRAVENOUS PRN
Status: DISCONTINUED | OUTPATIENT
Start: 2018-12-13 | End: 2018-12-13 | Stop reason: SDUPTHER

## 2018-12-13 RX ORDER — CEFAZOLIN SODIUM 1 G/3ML
INJECTION, POWDER, FOR SOLUTION INTRAMUSCULAR; INTRAVENOUS PRN
Status: DISCONTINUED | OUTPATIENT
Start: 2018-12-13 | End: 2018-12-13 | Stop reason: SDUPTHER

## 2018-12-13 RX ADMIN — SODIUM CHLORIDE: 9 INJECTION, SOLUTION INTRAVENOUS at 12:36

## 2018-12-13 RX ADMIN — PROPOFOL 10 MG: 10 INJECTION, EMULSION INTRAVENOUS at 12:57

## 2018-12-13 RX ADMIN — PROPOFOL 40 MG: 10 INJECTION, EMULSION INTRAVENOUS at 12:40

## 2018-12-13 RX ADMIN — PROPOFOL 10 MG: 10 INJECTION, EMULSION INTRAVENOUS at 12:51

## 2018-12-13 RX ADMIN — FENTANYL CITRATE 50 MCG: 50 INJECTION INTRAMUSCULAR; INTRAVENOUS at 12:40

## 2018-12-13 RX ADMIN — PROPOFOL 10 MG: 10 INJECTION, EMULSION INTRAVENOUS at 12:44

## 2018-12-13 RX ADMIN — PROPOFOL 10 MG: 10 INJECTION, EMULSION INTRAVENOUS at 12:48

## 2018-12-13 RX ADMIN — CEFAZOLIN 1000 MG: 1 INJECTION, POWDER, FOR SOLUTION INTRAMUSCULAR; INTRAVENOUS; PARENTERAL at 12:42

## 2018-12-13 ASSESSMENT — PULMONARY FUNCTION TESTS
PIF_VALUE: 0

## 2018-12-13 ASSESSMENT — PAIN DESCRIPTION - DESCRIPTORS: DESCRIPTORS: ACHING

## 2018-12-13 ASSESSMENT — PAIN - FUNCTIONAL ASSESSMENT: PAIN_FUNCTIONAL_ASSESSMENT: 0-10

## 2018-12-13 ASSESSMENT — PAIN SCALES - GENERAL: PAINLEVEL_OUTOF10: 0

## 2018-12-13 NOTE — ANESTHESIA PRE PROCEDURE
196 lb (88.9 kg)   07/30/18 201 lb (91.2 kg)   06/15/18 197 lb 12.8 oz (89.7 kg)     Body mass index is 30.7 kg/m². CBC:   Lab Results   Component Value Date    WBC 5.9 11/29/2018    RBC 4.43 11/29/2018    HGB 13.9 11/29/2018    HCT 41.9 11/29/2018    MCV 94.6 11/29/2018    RDW 13.2 10/31/2016     11/29/2018       CMP:   Lab Results   Component Value Date     11/29/2018    K 3.9 11/29/2018     11/29/2018    CO2 30 11/29/2018    BUN 14 11/29/2018    CREATININE 0.9 11/29/2018    AGRATIO 1.9 10/01/2018    LABGLOM 81 11/29/2018    GLUCOSE 75 11/29/2018    GLUCOSE 85 10/01/2018    PROT 6.3 10/01/2018    CALCIUM 9.5 11/29/2018    BILITOT 1.3 10/01/2018    ALKPHOS 41 10/01/2018    AST 26 10/01/2018    ALT 24 10/01/2018       POC Tests: No results for input(s): POCGLU, POCNA, POCK, POCCL, POCBUN, POCHEMO, POCHCT in the last 72 hours. Coags:   Lab Results   Component Value Date    PROTIME 12.4 10/31/2016    INR 1.08 10/31/2016       HCG (If Applicable): No results found for: PREGTESTUR, PREGSERUM, HCG, HCGQUANT     ABGs: No results found for: PHART, PO2ART, JYJ2BEW, BZE8UCO, BEART, C0ZOWIFA     Type & Screen (If Applicable):  No results found for: LABABO, LABRH    Anesthesia Evaluation    Airway: Mallampati: II  TM distance: >3 FB   Neck ROM: full  Mouth opening: > = 3 FB Dental:          Pulmonary: breath sounds clear to auscultation  (+) COPD:  sleep apnea:                             Cardiovascular:    (+) hypertension:, CAD:,         Rhythm: regular                      Neuro/Psych:               GI/Hepatic/Renal:   (+) GERD:, liver disease:,           Endo/Other:    (+) hypothyroidism::., .                 Abdominal:   (+) obese,         Vascular:                                        Anesthesia Plan      MAC     ASA 4       Induction: intravenous. Anesthetic plan and risks discussed with patient and spouse. Plan discussed with CRNA.                   Nathan Marcial MD 12/13/2018

## 2019-06-07 ENCOUNTER — OFFICE VISIT (OUTPATIENT)
Dept: FAMILY MEDICINE CLINIC | Age: 84
End: 2019-06-07
Payer: MEDICARE

## 2019-06-07 VITALS
RESPIRATION RATE: 14 BRPM | HEART RATE: 72 BPM | BODY MASS INDEX: 30.35 KG/M2 | SYSTOLIC BLOOD PRESSURE: 116 MMHG | DIASTOLIC BLOOD PRESSURE: 64 MMHG | WEIGHT: 193.8 LBS | TEMPERATURE: 97.9 F

## 2019-06-07 DIAGNOSIS — E03.9 HYPOTHYROIDISM, UNSPECIFIED TYPE: ICD-10-CM

## 2019-06-07 DIAGNOSIS — G47.30 SLEEP APNEA, UNSPECIFIED TYPE: ICD-10-CM

## 2019-06-07 DIAGNOSIS — M47.815 OSTEOARTHRITIS OF THORACOLUMBAR SPINE, UNSPECIFIED SPINAL OSTEOARTHRITIS COMPLICATION STATUS: ICD-10-CM

## 2019-06-07 DIAGNOSIS — I25.10 CORONARY ARTERY DISEASE INVOLVING NATIVE CORONARY ARTERY OF NATIVE HEART WITHOUT ANGINA PECTORIS: ICD-10-CM

## 2019-06-07 DIAGNOSIS — E78.5 HYPERLIPIDEMIA, UNSPECIFIED HYPERLIPIDEMIA TYPE: ICD-10-CM

## 2019-06-07 DIAGNOSIS — I10 ESSENTIAL HYPERTENSION: Primary | ICD-10-CM

## 2019-06-07 DIAGNOSIS — J44.9 CHRONIC OBSTRUCTIVE PULMONARY DISEASE, UNSPECIFIED COPD TYPE (HCC): ICD-10-CM

## 2019-06-07 DIAGNOSIS — E34.9 TESTOSTERONE DEFICIENCY: ICD-10-CM

## 2019-06-07 DIAGNOSIS — Z00.00 ROUTINE GENERAL MEDICAL EXAMINATION AT A HEALTH CARE FACILITY: ICD-10-CM

## 2019-06-07 PROCEDURE — 3023F SPIROM DOC REV: CPT | Performed by: FAMILY MEDICINE

## 2019-06-07 PROCEDURE — G8417 CALC BMI ABV UP PARAM F/U: HCPCS | Performed by: FAMILY MEDICINE

## 2019-06-07 PROCEDURE — 1123F ACP DISCUSS/DSCN MKR DOCD: CPT | Performed by: FAMILY MEDICINE

## 2019-06-07 PROCEDURE — G8926 SPIRO NO PERF OR DOC: HCPCS | Performed by: FAMILY MEDICINE

## 2019-06-07 PROCEDURE — 1036F TOBACCO NON-USER: CPT | Performed by: FAMILY MEDICINE

## 2019-06-07 PROCEDURE — G8427 DOCREV CUR MEDS BY ELIG CLIN: HCPCS | Performed by: FAMILY MEDICINE

## 2019-06-07 PROCEDURE — G0438 PPPS, INITIAL VISIT: HCPCS | Performed by: FAMILY MEDICINE

## 2019-06-07 PROCEDURE — G8598 ASA/ANTIPLAT THER USED: HCPCS | Performed by: FAMILY MEDICINE

## 2019-06-07 PROCEDURE — 99213 OFFICE O/P EST LOW 20 MIN: CPT | Performed by: FAMILY MEDICINE

## 2019-06-07 PROCEDURE — 4040F PNEUMOC VAC/ADMIN/RCVD: CPT | Performed by: FAMILY MEDICINE

## 2019-06-07 ASSESSMENT — LIFESTYLE VARIABLES
HAVE YOU OR SOMEONE ELSE BEEN INJURED AS A RESULT OF YOUR DRINKING: 0
HOW OFTEN DO YOU HAVE SIX OR MORE DRINKS ON ONE OCCASION: 0
HOW OFTEN DURING THE LAST YEAR HAVE YOU NEEDED AN ALCOHOLIC DRINK FIRST THING IN THE MORNING TO GET YOURSELF GOING AFTER A NIGHT OF HEAVY DRINKING: 0
AUDIT-C TOTAL SCORE: 1
HOW OFTEN DURING THE LAST YEAR HAVE YOU BEEN UNABLE TO REMEMBER WHAT HAPPENED THE NIGHT BEFORE BECAUSE YOU HAD BEEN DRINKING: 0
HOW OFTEN DURING THE LAST YEAR HAVE YOU HAD A FEELING OF GUILT OR REMORSE AFTER DRINKING: 0
HOW MANY STANDARD DRINKS CONTAINING ALCOHOL DO YOU HAVE ON A TYPICAL DAY: 0
HAS A RELATIVE, FRIEND, DOCTOR, OR ANOTHER HEALTH PROFESSIONAL EXPRESSED CONCERN ABOUT YOUR DRINKING OR SUGGESTED YOU CUT DOWN: 0
AUDIT TOTAL SCORE: 1
HOW OFTEN DURING THE LAST YEAR HAVE YOU FAILED TO DO WHAT WAS NORMALLY EXPECTED FROM YOU BECAUSE OF DRINKING: 0
HOW OFTEN DURING THE LAST YEAR HAVE YOU FOUND THAT YOU WERE NOT ABLE TO STOP DRINKING ONCE YOU HAD STARTED: 0
HOW OFTEN DO YOU HAVE A DRINK CONTAINING ALCOHOL: 1

## 2019-06-07 ASSESSMENT — ANXIETY QUESTIONNAIRES: GAD7 TOTAL SCORE: 0

## 2019-06-07 ASSESSMENT — ENCOUNTER SYMPTOMS
ANAL BLEEDING: 0
DIARRHEA: 1
VOMITING: 0
CHEST TIGHTNESS: 0
ABDOMINAL PAIN: 0
NAUSEA: 0
BLOOD IN STOOL: 0
CONSTIPATION: 0
SHORTNESS OF BREATH: 1

## 2019-06-07 ASSESSMENT — PATIENT HEALTH QUESTIONNAIRE - PHQ9
SUM OF ALL RESPONSES TO PHQ QUESTIONS 1-9: 0
SUM OF ALL RESPONSES TO PHQ QUESTIONS 1-9: 0

## 2019-06-07 NOTE — PROGRESS NOTES
OBJECTIVE     /64   Pulse 72   Temp 97.9 °F (36.6 °C) (Oral)   Resp 14   Wt 193 lb 12.8 oz (87.9 kg)   BMI 30.35 kg/m²     Wt Readings from Last 3 Encounters:   06/07/19 193 lb 12.8 oz (87.9 kg)   12/13/18 196 lb (88.9 kg)   07/30/18 201 lb (91.2 kg)     Body mass index is 30.35 kg/m². Physical Exam   Constitutional: He is oriented to person, place, and time. He appears well-developed and well-nourished. HENT:   Head: Normocephalic and atraumatic. Right Ear: External ear normal.   Left Ear: External ear normal.   Nose: Nose normal.   Mouth/Throat: Oropharynx is clear and moist.   Eyes: Pupils are equal, round, and reactive to light. Conjunctivae and EOM are normal.   Neck: Normal range of motion. Neck supple. Cardiovascular: Normal rate, regular rhythm and intact distal pulses. Occasional extrasystoles are present. Pulmonary/Chest: Effort normal and breath sounds normal.   Abdominal: Soft. Bowel sounds are normal.   Musculoskeletal: Normal range of motion. Neurological: He is alert and oriented to person, place, and time. He has normal reflexes. Skin: Skin is warm and dry. Psychiatric: He has a normal mood and affect. His behavior is normal. Judgment and thought content normal.   Nursing note and vitals reviewed.     Component      Latest Ref Rng & Units 6/6/2019 6/3/2019 5/13/2019   WBC      4.4 - 10.5 th/cmm  5.1    RBC      4.50 - 6.00 mil/cmm  4.47 (L)    Hemoglobin Quant      13.5 - 16.5 gm/dL  13.8    Hematocrit      40.0 - 49.0 %  42.0    MCV      80 - 97 CU SAMRA  93.9    MCH      27.5 - 33.0 PG  30.9    MCHC      33.0 - 36.0 gm/dL  32.9 (L)    RDW      12.0 - 16.0 %  12.7    Platelet Count      051 - 400 th/cmm  143 (L)    Neutrophils %      40 - 70 %  45.0    Lymphocyte %      15 - 45 %  39.9    Monocytes %      2 - 10 %  9.6    Eosinophils %      0 - 6 %  4.4    Basophils %      0 - 2 %  1.1    Nucleated Red Blood Cells      <1 /100 WBC  0.1    Absolute Neut #      1800 - 7700 /cmm  2300    Absolute Lymph #      1000 - 4800 /cmm  2000    Absolute Mono #      0 - 800 /cmm  500    Absolute Eos #      0 - 500 /cmm  200    Basophils #      0 - 200 /cmm  100    Sodium      135 - 145 mEq/L  140 139   Potassium      3.6 - 5.0 mEq/L  4.1 3.7   Chloride      101 - 111 mEq/L  103 106   CO2      21 - 32 mEq/L  26 27   Anion Gap      4 - 12  11 6   Glucose      70 - 110 mg/dL  92 84   BUN      7 - 20 mg/dL  13 15   Creatinine      0.70 - 1.30 mg/dL  0.95 0.90   Creatinine Clearance        >60 >60   AST      15 - 41 IU/L  24 25   Alk Phos      41 - 137 IU/L  42 42   Bilirubin      0.2 - 1.0 mg/dL  1.0 1.2 (H)   Calcium      8.8 - 10.5 mg/dL  8.9 9.0   Albumin      3.5 - 5.0 gm/dL  4.1 4.0   Total Protein      6.2 - 8.0 g/dL  7.1 7.0   Albumin/Globulin Ratio      1.5 - 2.5  1.4 (L) 1.3 (L)   ALT      10 - 40 IU/L  23 23   LDL Direct      mg/dl   89   Cholesterol      <200 mg/dL   111   Triglycerides      <150 mg/dL   100   HDL Cholesterol      mg/dL   35 (L)   CHOLESTEROL/HDL RELATIVE RISK      4.0 - 5.0   3.1 (L)   Direct-LDL / HDL Risk      <3.1   2.5   VLDL      <39 mg/dL   20   Blood Type       A Negative     Ab Scrn       NEGATIVE     Magnesium      1.8 - 2.5 mg/dL   2.0       Lab Results   Component Value Date    TSH 0.04 (L) 08/06/2014    [id number] 142.41 08/06/2014    T4FREE 1.16 (H) 10/02/2015       Lab Results   Component Value Date    WBC 5.1 06/03/2019    HGB 13.8 06/03/2019    HCT 42.0 06/03/2019    MCV 93.9 06/03/2019     (L) 06/03/2019       Lab Results   Component Value Date    PSA 0.13 07/13/2015    PSA 0.17 08/06/2014    PSA 0.17 06/17/2013        Immunization History   Administered Date(s) Administered    Influenza Virus Vaccine 11/01/2011, 10/01/2012, 10/01/2013, 09/23/2014, 08/26/2015, 10/01/2015, 10/11/2017    Influenza Whole 10/01/2010    Influenza, High Dose (Fluzone 65 yrs and older) 10/09/2018    Influenza, [place], 3 Years and older, IM (Fluzone 3 yrs and older or Afluria 5 yrs and older) 10/13/2016    Pneumococcal 13-valent Conjugate (Pxfzhrm13) 05/18/2015    Pneumococcal Polysaccharide (Mfkyzmvvn37) 10/01/2005    Td, unspecified formulation 07/20/2012    Tdap (Boostrix, Adacel) 10/13/2016, 04/19/2017    Zoster Live (Zostavax) 04/30/2012       Health Maintenance   Topic Date Due    Shingles Vaccine (2 of 3) 06/25/2012    TSH testing  08/06/2015    Potassium monitoring  06/03/2020    Creatinine monitoring  06/03/2020    DTaP/Tdap/Td vaccine (3 - Td) 04/19/2027    Flu vaccine  Completed    Pneumococcal 65+ years Vaccine  Completed         AAA ultrasound (Male, 65-75, smoked ever) indicated at this time? NO  CT Lung Screen (55-80, 30 pk-yrs, smoking or quit <15 years) indicated at this time? No longer needed, due to age      No future appointments. ASSESSMENT       Diagnosis Orders   1. Essential hypertension     2. Hyperlipidemia, unspecified hyperlipidemia type     3. Hypothyroidism, unspecified type     4. Coronary artery disease involving native coronary artery of native heart without angina pectoris- (Dr. Poly Weinberg)     5. Chronic obstructive pulmonary disease, unspecified COPD type ([facility] [address]     6. Testosterone deficiency (Dr. Jess Rodriguez)      7. Osteoarthritis of thoracolumbar spine, unspecified spinal osteoarthritis complication status     8. Sleep apnea, unspecified type         PLAN      Encouraged NEW SHINGLES SHOT [facility]) - check with your local pharmacy. COLONOSCOPY done 10/17/2014 per Dr. Panda Crane- no further mandated. (updated 6/7/2019 )   Declines FIT testing at this time (updated 6/7/2019)  DEXA management per [place] clinic- done 6/17/2016- severe Osteopenia- pt to check on date of last DEXA SCAN and have faxed to our office.    PSA on hold at this time due to age and fact that PSA has always been very low in past. (updated 6/7/2019)  Will call and schedule appt with Dr. Trell Ansari (Pulmology) as pt has seen in past- Please discuss need for any further CT's of Lung (due to stable left lung mass) with Pulmonology at your next visit    Please check with Dr. Elvin Galindo office re last Thyroid Studies completed and 8# weight loss over past 12 months. 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 Ottoniel Springer as scheduled   Continue current medicines. No refills needed. Follow up in 3 months. Electronically signed by Sb Moon MD on 2019 at 12:09 PM                                                             Medicare Annual Wellness Visit  Name: Isabella Kimble Date: 2019   MRN: [id number] Sex: Male   Age: 81 y.o. Ethnicity: Non-/Non    : 1935 Race: Elisa Gilliam is here for Follow-up (pt doing well overall, did have a heart cath done yesterday with [place] 20% blockage,did complain of headache lastnight,  unexplained weight loss, some diarrhea for several days, )    Screenings for behavioral, psychosocial and functional/safety risks, and cognitive dysfunction are all negative except as indicated below. These results, as well as other patient data from the [address] form, are documented in Flowsheets linked to this Encounter. Allergies   Allergen Reactions    Lisinopril Other (See Comments)     Cough      Iv Dye [Iodides] Rash     Prior to Visit Medications    Medication Sig Taking?  Authorizing Provider   Cholecalciferol (VITAMIN D3) 2000 units CAPS Take 1 capsule by mouth daily Yes Historical Provider, MD   vitamin B-12 (CYANOCOBALAMIN) 1000 MCG tablet Take 1,000 mcg by mouth every other day Yes Historical Provider, MD   atorvastatin (LIPITOR) 80 MG tablet Take 80 mg by mouth daily Yes Historical Provider, MD   furosemide (LASIX) 40 MG tablet Take 20 mg by mouth daily Indications: [facility] Dr. Ottoniel Springer  Yes Historical Provider, MD   potassium chloride 10 MEQ/100ML Take 10 mEq by mouth once Indications: 10 meq [facility] Dr. Mars Cabrera  Yes Historical Provider, MD   Testosterone (ANDRODERM) 4 MG/24HR PT24 Place 2 mg onto the skin daily. Yes Historical Provider, MD   levothyroxine (SYNTHROID) 75 MCG tablet Take 75 mcg by mouth Daily Yes Historical Provider, MD   alendronate (FOSAMAX) 70 MG tablet Take 70 mg by mouth every 7 days Take every Wed. Yes Historical Provider, MD   metoprolol (LOPRESSOR) 25 MG tablet Take 1 tablet by mouth daily Yes Myesha Li MD   losartan (COZAAR) 50 MG tablet Take 1 tablet by mouth daily Yes Myesha Li MD   isosorbide mononitrate (IMDUR) 60 MG CR tablet Take 60 mg by mouth daily.  Yes Historical Provider, MD   aspirin 81 MG EC tablet   Take 81 mg by mouth daily Last dose 5-7-2015 for surgery 5- Yes Historical Provider, MD   vitamin E 100 UNIT capsule   Take 400 Units by mouth 2 times daily Last dose 5-7-2015 for surgery 5- Yes Historical Provider, MD   ipratropium-albuterol (DUONEB) 0.5-2.5 (3) MG/3ML SOLN nebulizer solution Inhale 1 vial into the lungs every 4 hours as needed   Historical Provider, MD   budesonide-formoterol (SYMBICORT) 160-4.5 MCG/ACT AERO Inhale 2 puffs into the lungs 2 times daily  Read Kanner, MD   sodium chloride (OCEAN) 0.65 % nasal spray 1 spray by Nasal route 2 times daily as needed for Congestion  Historical Provider, MD   albuterol sulfate HFA (PROAIR HFA) 108 (90 Base) MCG/ACT inhaler Inhale 2 puffs into the lungs every 6 hours as needed for Wheezing  Historical Provider, MD   naproxen (NAPROSYN) 500 MG tablet Take 1 tablet by mouth daily  Myesha Li MD     Past Medical History:   Diagnosis Date    BCC (basal cell carcinoma), face     Nasal- Dr. Kiersten Alexander [facility]    CAD (coronary artery disease)     [facility]    dx'd in [place] GERD (gastroesophageal reflux disease)     GERD    Hyperlipidemia     Hypertension     Hypothyroidism     Irregular heart beat     LISA (nonalcoholic steatohepatitis)     OA (osteoarthritis)     Sleep apnea     uses cpap    Testosterone deficiency      Past Surgical History:   Procedure Laterality Date    CARDIAC CATHETERIZATION  10/04/2010    CARDIAC CATHETERIZATION  2001    stent x1    COLONOSCOPY      last one 2010    EYE SURGERY  2012    bilateral cataracts    JOINT REPLACEMENT  1995    right knee    JOINT REPLACEMENT  1998    left knee    JOINT REPLACEMENT  2014 - aug.    right shoulder    MOHS SURGERY  4/13    Dr. Serafin Wilder  05/14/2015    Right side of scalp     MOHS SURGERY N/A 12/13/2018    MOHS REPAIR BCC DORSAL NOSE performed by Latoya Leblanc MD at [address] [place] [address]  2009    TUMOR REMOVED    GA SONO GUIDE NEEDLE BIOPSY  06/2016    Benign, done in [facility] Right 8/8/14    Dr. Clinton Yusuf - total replacement   [facility] Iliana Rashid and Dr. Laxmi Estrella      as a child   [address]?? History reviewed. No pertinent family history. CareTeam (Including outside providers/suppliers regularly involved in providing care):   Patient Care Team:  Frieda Hays MD as PCP - General (Family Medicine)  Frieda Hays MD as PCP - REHABILITATION [facility] Empaneled Provider    Wt Readings from Last 3 Encounters:   06/07/19 193 lb 12.8 oz (87.9 kg)   12/13/18 196 lb (88.9 kg)   07/30/18 201 lb (91.2 kg)     Vitals:    06/07/19 1040   BP: 116/64   Pulse: 72   Resp: 14   Temp: 97.9 °F (36.6 °C)   TempSrc: Oral   Weight: 193 lb 12.8 oz (87.9 kg)     Body mass index is 30.35 kg/m². Based upon direct observation of the patient, evaluation of cognition reveals recent and remote memory intact.     General Appearance: alert and oriented to person, place and time, well developed and well- nourished, in no acute distress  Skin: warm and dry, no rash or erythema  Head: normocephalic and atraumatic  Eyes: pupils equal, round, and reactive to light, extraocular eye movements intact, conjunctivae normal  ENT: tympanic membrane, external ear and ear canal normal bilaterally, nose without deformity, nasal mucosa and turbinates normal without polyps  Neck: supple and non-tender without mass, no thyromegaly or thyroid nodules, no cervical lymphadenopathy  Pulmonary/Chest: clear to auscultation bilaterally- no wheezes, rales or rhonchi, normal air movement, no respiratory distress  Cardiovascular: normal rate, regular rhythm, normal S1 and S2, no murmurs, rubs, clicks, or gallops, distal pulses intact, no carotid bruits  Abdomen: soft, non-tender, non-distended, normal bowel sounds, no masses or organomegaly  Extremities: no cyanosis, clubbing or edema  Musculoskeletal: normal range of motion, no joint swelling, deformity or tenderness  Neurologic: reflexes normal and symmetric, no cranial nerve deficit, gait, coordination and speech normal    Patient's complete Health Risk Assessment and screening values have been reviewed and are found in Flowsheets. The following problems were reviewed today and where indicated follow up appointments were made and/or referrals ordered. Positive Risk Factor Screenings with Interventions:     Health Habits/Nutrition:  Health Habits/Nutrition  Do you exercise for at least 20 minutes 2-3 times per week?: (!) No  Have you lost any weight without trying in the past 3 months?: (!) Yes  Do you eat fewer than 2 meals per day?: No  Have you seen a dentist within the past year?: (!) No  Body mass index is 30.35 kg/m².   Health Habits/Nutrition Interventions:  · Inadequate physical activity:  patient is not ready to increase his/her physical activity level at this time  · Nutritional issues:  patient is not ready to address his/her nutritional/weight issues at this time  · Dental exam overdue:  patient declines dental evaluation     Encouraged pt to eat well balanced meals 3x/day    Safety:  Safety  Do you have working smoke detectors?: Yes  Have all throw rugs been removed or fastened?: Yes  Do you have non-slip mats in all bathtubs?: Yes  Do all of your stairways have a railing or banister?: (!) No  Are your doorways, halls and stairs free of clutter?: Yes  Do you always fasten your seatbelt when you are in a car?: Yes  Safety Interventions:  · No true stairways in house, only 2 step platforms. Personalized Preventive Plan   Current Health Maintenance Status  Immunization History   Administered Date(s) Administered    Influenza Virus Vaccine 11/01/2011, 10/01/2012, 10/01/2013, 09/23/2014, 08/26/2015, 10/01/2015, 10/11/2017    Influenza Whole 10/01/2010    Influenza, High Dose (Fluzone 65 yrs and older) 10/09/2018    Influenza, [place], 3 Years and older, IM (Fluzone 3 yrs and older or Afluria 5 yrs and older) 10/13/2016    Pneumococcal 13-valent Conjugate (Suhxyuk14) 05/18/2015    Pneumococcal Polysaccharide (Efhctcvec10) 10/01/2005    Td, unspecified formulation 07/20/2012    Tdap (Boostrix, Adacel) 10/13/2016, 04/19/2017    Zoster Live (Zostavax) 04/30/2012        Health Maintenance   Topic Date Due    Shingles Vaccine (2 of 3) 06/25/2012    TSH testing  08/06/2015    Potassium monitoring  06/03/2020    Creatinine monitoring  06/03/2020    DTaP/Tdap/Td vaccine (3 - Td) 04/19/2027    Flu vaccine  Completed    Pneumococcal 65+ years Vaccine  Completed     Recommendations for Preventive Services Due: see orders and patient instructions/AVS.  .   Recommended screening schedule for the next 5-10 years is provided to the patient in written form: see Patient Instructions/AVS.

## 2019-06-07 NOTE — PATIENT INSTRUCTIONS
Encouraged NEW SHINGLES SHOT [facility]) - check with your local pharmacy. COLONOSCOPY done 10/17/2014 per Dr. Sarabjit Jensen- no further mandated. (updated 6/7/2019 )   Declines FIT testing at this time (updated 6/7/2019)  DEXA management per [place] [facility]- done 6/17/2016- severe Osteopenia- pt to check on date of last DEXA SCAN and have faxed to our office. PSA on hold at this time due to age and fact that PSA has always been very low in past. (updated 6/7/2019)  Will call and schedule appt with Dr. Arturo Crespo (Pulmology) as pt has seen in past- Please discuss need for any further CT's of Lung (due to stable left lung mass) with Pulmonology at your next visit    Please check with Dr. Faustino Juarez office re last Thyroid Studies completed and 8# weight loss over past 12 months. 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 Amor Baldwin as scheduled   Continue current medicines. No refills needed. Follow up in 3 months. Personalized Preventive Plan for Kathy Clayton - 6/7/2019  Medicare offers a range of preventive health benefits. Some of the tests and screenings are paid in full while other may be subject to a deductible, co-insurance, and/or copay. Some of these benefits include a comprehensive review of your medical history including lifestyle, illnesses that may run in your family, and various assessments and screenings as appropriate. After reviewing your medical record and screening and assessments performed today your provider may have ordered immunizations, labs, imaging, and/or referrals for you. A list of these orders (if applicable) as well as your Preventive Care list are included within your After Visit Summary for your review.     Other Preventive Recommendations:    · A preventive eye exam performed by an eye specialist is recommended every 1-2 years to screen for glaucoma; cataracts, macular degeneration, and other eye disorders. · A preventive dental visit is recommended every 6 months. · Try to get at least 150 minutes of exercise per week or 10,000 steps per day on a pedometer . · Order or download the FREE \"Exercise & Physical Activity: Your Everyday Guide\" from [employer]. Call 9-102.412.1500 or search [employer] online. · You need 2811-6831 mg of calcium and 2364-0376 IU of vitamin D per day. It is possible to meet your calcium requirement with diet alone, but a vitamin D supplement is usually necessary to meet this goal.  · When exposed to the sun, use a sunscreen that protects against both UVA and UVB radiation with an SPF of 30 or greater. Reapply every 2 to 3 hours or after sweating, drying off with a towel, or swimming. · Always wear a seat belt when traveling in a car. Always wear a helmet when riding a bicycle or motorcycle.

## 2019-06-17 ENCOUNTER — CARE COORDINATION (OUTPATIENT)
Dept: CASE MANAGEMENT | Age: 84
End: 2019-06-17

## 2019-06-25 ENCOUNTER — CARE COORDINATION (OUTPATIENT)
Dept: CARE COORDINATION | Age: 84
End: 2019-06-25

## 2019-07-19 ENCOUNTER — CARE COORDINATION (OUTPATIENT)
Dept: CASE MANAGEMENT | Age: 84
End: 2019-07-19

## 2019-07-25 DIAGNOSIS — M47.815 OSTEOARTHRITIS OF THORACOLUMBAR SPINE, UNSPECIFIED SPINAL OSTEOARTHRITIS COMPLICATION STATUS: ICD-10-CM

## 2019-07-25 RX ORDER — NAPROXEN 500 MG/1
500 TABLET ORAL DAILY
Qty: 90 TABLET | Refills: 3 | Status: SHIPPED | OUTPATIENT
Start: 2019-07-25 | End: 2020-07-20 | Stop reason: SDUPTHER

## 2019-09-12 ENCOUNTER — OFFICE VISIT (OUTPATIENT)
Dept: FAMILY MEDICINE CLINIC | Age: 84
End: 2019-09-12
Payer: MEDICARE

## 2019-09-12 VITALS
TEMPERATURE: 97 F | BODY MASS INDEX: 30.76 KG/M2 | HEART RATE: 80 BPM | RESPIRATION RATE: 20 BRPM | WEIGHT: 196.38 LBS | DIASTOLIC BLOOD PRESSURE: 74 MMHG | SYSTOLIC BLOOD PRESSURE: 120 MMHG

## 2019-09-12 DIAGNOSIS — E34.9 TESTOSTERONE DEFICIENCY: ICD-10-CM

## 2019-09-12 DIAGNOSIS — K21.9 GASTROESOPHAGEAL REFLUX DISEASE, ESOPHAGITIS PRESENCE NOT SPECIFIED: ICD-10-CM

## 2019-09-12 DIAGNOSIS — E03.9 HYPOTHYROIDISM, UNSPECIFIED TYPE: ICD-10-CM

## 2019-09-12 DIAGNOSIS — E78.5 HYPERLIPIDEMIA, UNSPECIFIED HYPERLIPIDEMIA TYPE: ICD-10-CM

## 2019-09-12 DIAGNOSIS — M47.815 OSTEOARTHRITIS OF THORACOLUMBAR SPINE, UNSPECIFIED SPINAL OSTEOARTHRITIS COMPLICATION STATUS: ICD-10-CM

## 2019-09-12 DIAGNOSIS — J44.9 CHRONIC OBSTRUCTIVE PULMONARY DISEASE, UNSPECIFIED COPD TYPE (HCC): ICD-10-CM

## 2019-09-12 DIAGNOSIS — I25.10 CORONARY ARTERY DISEASE INVOLVING NATIVE CORONARY ARTERY OF NATIVE HEART WITHOUT ANGINA PECTORIS: ICD-10-CM

## 2019-09-12 DIAGNOSIS — M85.80 OSTEOPENIA, UNSPECIFIED LOCATION: ICD-10-CM

## 2019-09-12 DIAGNOSIS — I10 ESSENTIAL HYPERTENSION: Primary | ICD-10-CM

## 2019-09-12 PROCEDURE — 99214 OFFICE O/P EST MOD 30 MIN: CPT | Performed by: FAMILY MEDICINE

## 2019-09-12 PROCEDURE — 1123F ACP DISCUSS/DSCN MKR DOCD: CPT | Performed by: FAMILY MEDICINE

## 2019-09-12 PROCEDURE — 1036F TOBACCO NON-USER: CPT | Performed by: FAMILY MEDICINE

## 2019-09-12 PROCEDURE — G8417 CALC BMI ABV UP PARAM F/U: HCPCS | Performed by: FAMILY MEDICINE

## 2019-09-12 PROCEDURE — 4040F PNEUMOC VAC/ADMIN/RCVD: CPT | Performed by: FAMILY MEDICINE

## 2019-09-12 PROCEDURE — 3023F SPIROM DOC REV: CPT | Performed by: FAMILY MEDICINE

## 2019-09-12 PROCEDURE — G8427 DOCREV CUR MEDS BY ELIG CLIN: HCPCS | Performed by: FAMILY MEDICINE

## 2019-09-12 PROCEDURE — G8926 SPIRO NO PERF OR DOC: HCPCS | Performed by: FAMILY MEDICINE

## 2019-09-12 PROCEDURE — G8598 ASA/ANTIPLAT THER USED: HCPCS | Performed by: FAMILY MEDICINE

## 2019-09-12 RX ORDER — FUROSEMIDE 20 MG/1
20 TABLET ORAL DAILY
COMMUNITY
End: 2021-08-16

## 2019-09-12 ASSESSMENT — ENCOUNTER SYMPTOMS
BLOOD IN STOOL: 0
DIARRHEA: 0
ABDOMINAL PAIN: 0
ANAL BLEEDING: 0
SHORTNESS OF BREATH: 1
NAUSEA: 0
CHEST TIGHTNESS: 0
CONSTIPATION: 0
VOMITING: 0

## 2019-09-12 NOTE — PROGRESS NOTES
albuterol sulfate HFA (PROAIR HFA) 108 (90 Base) MCG/ACT inhaler Inhale 2 puffs into the lungs every 6 hours as needed for Wheezing      atorvastatin (LIPITOR) 80 MG tablet Take 80 mg by mouth daily      potassium chloride 10 MEQ/100ML Take 10 mEq by mouth once Indications: 10 meq [facility] Dr. Autumn Urbina Testosterone (ANDRODERM) 4 MG/24HR PT24 Place 2 mg onto the skin daily.  levothyroxine (SYNTHROID) 75 MCG tablet Take 75 mcg by mouth Daily      alendronate (FOSAMAX) 70 MG tablet Take 70 mg by mouth every 7 days Take every Wed.  metoprolol (LOPRESSOR) 25 MG tablet Take 1 tablet by mouth daily 90 tablet 3    losartan (COZAAR) 50 MG tablet Take 1 tablet by mouth daily 90 tablet 3    isosorbide mononitrate (IMDUR) 60 MG CR tablet Take 60 mg by mouth daily.  aspirin 81 MG EC tablet   Take 81 mg by mouth daily Last dose 5-7-2015 for surgery 5-      vitamin E 100 UNIT capsule   Take 400 Units by mouth 2 times daily Last dose 5-7-2015 for surgery 5-       No current facility-administered medications for this visit. Review of Systems   Constitutional: Negative for chills, diaphoresis, fatigue, fever and unexpected weight change. Eyes: Negative for visual disturbance. Respiratory: Positive for shortness of breath (with exertion- seeing both Dr. Deepa Cisneros and Dr. Nat Mercado for this. ). Negative for chest tightness. Cardiovascular: Negative for chest pain, palpitations and leg swelling. Gastrointestinal: Negative for abdominal pain, anal bleeding, blood in stool, constipation, diarrhea, nausea and vomiting. Genitourinary: Negative for dysuria and hematuria. Musculoskeletal: Negative for neck pain. Neurological: Negative for dizziness, light-headedness and headaches.      OBJECTIVE     /74   Pulse 80   Temp 97 °F (36.1 °C) (Temporal)   Resp 20   Wt 196 lb 6 oz (89.1 kg)   BMI 30.76 kg/m²     Wt Readings from Last 3 Encounters:   09/12/19 196 lb 6 oz (89.1 kg)

## 2019-10-04 ENCOUNTER — TELEPHONE (OUTPATIENT)
Dept: FAMILY MEDICINE CLINIC | Age: 84
End: 2019-10-04

## 2019-10-04 DIAGNOSIS — M54.50 LOW BACK PAIN, UNSPECIFIED BACK PAIN LATERALITY, UNSPECIFIED CHRONICITY, UNSPECIFIED WHETHER SCIATICA PRESENT: Primary | ICD-10-CM

## 2019-10-14 ENCOUNTER — HOSPITAL ENCOUNTER (OUTPATIENT)
Dept: PHYSICAL THERAPY | Age: 84
Setting detail: THERAPIES SERIES
Discharge: HOME | End: 2019-10-14
Payer: MEDICARE

## 2019-10-14 PROCEDURE — 97110 THERAPEUTIC EXERCISES: CPT

## 2019-10-14 PROCEDURE — 97162 PT EVAL MOD COMPLEX 30 MIN: CPT

## 2019-10-14 ASSESSMENT — PAIN DESCRIPTION - PAIN TYPE: TYPE: CHRONIC PAIN

## 2019-10-14 ASSESSMENT — PAIN DESCRIPTION - LOCATION: LOCATION: BACK;LEG

## 2019-10-14 ASSESSMENT — PAIN DESCRIPTION - ORIENTATION: ORIENTATION: LOWER;RIGHT;LEFT

## 2019-10-14 ASSESSMENT — PAIN SCALES - GENERAL: PAINLEVEL_OUTOF10: 6

## 2019-10-17 ENCOUNTER — HOSPITAL ENCOUNTER (OUTPATIENT)
Dept: PHYSICAL THERAPY | Age: 84
Setting detail: THERAPIES SERIES
Discharge: HOME | End: 2019-10-17
Payer: MEDICARE

## 2019-10-17 PROCEDURE — 97110 THERAPEUTIC EXERCISES: CPT

## 2019-10-17 PROCEDURE — 97032 APPL MODALITY 1+ESTIM EA 15: CPT

## 2019-10-17 ASSESSMENT — PAIN SCALES - GENERAL: PAINLEVEL_OUTOF10: 5

## 2019-10-17 ASSESSMENT — PAIN DESCRIPTION - ORIENTATION: ORIENTATION: LEFT;LOWER

## 2019-10-17 ASSESSMENT — PAIN DESCRIPTION - LOCATION: LOCATION: BACK

## 2019-10-17 ASSESSMENT — PAIN DESCRIPTION - PAIN TYPE: TYPE: CHRONIC PAIN

## 2019-10-22 ENCOUNTER — HOSPITAL ENCOUNTER (OUTPATIENT)
Dept: CT IMAGING | Age: 84
Discharge: HOME | End: 2019-10-22
Payer: MEDICARE

## 2019-10-22 ENCOUNTER — HOSPITAL ENCOUNTER (OUTPATIENT)
Dept: MRI IMAGING | Age: 84
Discharge: HOME | End: 2019-10-22
Payer: MEDICARE

## 2019-10-22 ENCOUNTER — OFFICE VISIT (OUTPATIENT)
Dept: PHYSICAL MEDICINE AND REHAB | Age: 84
End: 2019-10-22
Payer: MEDICARE

## 2019-10-22 VITALS
HEIGHT: 67 IN | WEIGHT: 196.21 LBS | HEART RATE: 80 BPM | DIASTOLIC BLOOD PRESSURE: 70 MMHG | BODY MASS INDEX: 30.8 KG/M2 | SYSTOLIC BLOOD PRESSURE: 138 MMHG

## 2019-10-22 DIAGNOSIS — Z00.6 EXAMINATION FOR NORMAL COMPARISON FOR CLINICAL RESEARCH: ICD-10-CM

## 2019-10-22 DIAGNOSIS — I49.9 IRREGULAR HEART BEAT: ICD-10-CM

## 2019-10-22 DIAGNOSIS — M80.00XS OSTEOPOROSIS WITH CURRENT PATHOLOGICAL FRACTURE, UNSPECIFIED OSTEOPOROSIS TYPE, SEQUELA: ICD-10-CM

## 2019-10-22 DIAGNOSIS — Z96.653 HISTORY OF TOTAL BILATERAL KNEE REPLACEMENT: ICD-10-CM

## 2019-10-22 DIAGNOSIS — Z96.611 HISTORY OF RIGHT SHOULDER REPLACEMENT: ICD-10-CM

## 2019-10-22 DIAGNOSIS — M47.816 LUMBAR SPONDYLOSIS: Primary | ICD-10-CM

## 2019-10-22 PROCEDURE — G8598 ASA/ANTIPLAT THER USED: HCPCS | Performed by: PHYSICAL MEDICINE & REHABILITATION

## 2019-10-22 PROCEDURE — 1036F TOBACCO NON-USER: CPT | Performed by: PHYSICAL MEDICINE & REHABILITATION

## 2019-10-22 PROCEDURE — 99204 OFFICE O/P NEW MOD 45 MIN: CPT | Performed by: PHYSICAL MEDICINE & REHABILITATION

## 2019-10-22 PROCEDURE — 1123F ACP DISCUSS/DSCN MKR DOCD: CPT | Performed by: PHYSICAL MEDICINE & REHABILITATION

## 2019-10-22 PROCEDURE — G8417 CALC BMI ABV UP PARAM F/U: HCPCS | Performed by: PHYSICAL MEDICINE & REHABILITATION

## 2019-10-22 PROCEDURE — 3209999900 CT COMPARISON OF OUTSIDE FILMS

## 2019-10-22 PROCEDURE — 3209999900 MRI COMPARISON OF OUTSIDE FILMS

## 2019-10-22 PROCEDURE — G8427 DOCREV CUR MEDS BY ELIG CLIN: HCPCS | Performed by: PHYSICAL MEDICINE & REHABILITATION

## 2019-10-22 PROCEDURE — 4040F PNEUMOC VAC/ADMIN/RCVD: CPT | Performed by: PHYSICAL MEDICINE & REHABILITATION

## 2019-10-22 PROCEDURE — G8484 FLU IMMUNIZE NO ADMIN: HCPCS | Performed by: PHYSICAL MEDICINE & REHABILITATION

## 2019-10-23 ENCOUNTER — HOSPITAL ENCOUNTER (OUTPATIENT)
Dept: MRI IMAGING | Age: 84
Discharge: HOME | End: 2019-10-23
Payer: MEDICARE

## 2019-10-23 ENCOUNTER — HOSPITAL ENCOUNTER (OUTPATIENT)
Dept: MRI IMAGING | Age: 84
Discharge: HOME | End: 2019-10-23
Payer: OTHER GOVERNMENT

## 2019-10-23 ENCOUNTER — HOSPITAL ENCOUNTER (OUTPATIENT)
Dept: PHYSICAL THERAPY | Age: 84
Setting detail: THERAPIES SERIES
Discharge: HOME | End: 2019-10-23
Payer: MEDICARE

## 2019-10-23 DIAGNOSIS — M47.816 LUMBAR SPONDYLOSIS: ICD-10-CM

## 2019-10-23 DIAGNOSIS — Z86.018: ICD-10-CM

## 2019-10-23 PROCEDURE — G0283 ELEC STIM OTHER THAN WOUND: HCPCS

## 2019-10-23 PROCEDURE — 97110 THERAPEUTIC EXERCISES: CPT

## 2019-10-23 PROCEDURE — 70553 MRI BRAIN STEM W/O & W/DYE: CPT

## 2019-10-23 PROCEDURE — A9579 GAD-BASE MR CONTRAST NOS,1ML: HCPCS | Performed by: INTERNAL MEDICINE

## 2019-10-23 PROCEDURE — 72148 MRI LUMBAR SPINE W/O DYE: CPT

## 2019-10-23 PROCEDURE — 6360000004 HC RX CONTRAST MEDICATION: Performed by: INTERNAL MEDICINE

## 2019-10-23 RX ADMIN — GADOTERIDOL 19 ML: 279.3 INJECTION, SOLUTION INTRAVENOUS at 16:48

## 2019-10-23 ASSESSMENT — PAIN DESCRIPTION - ORIENTATION: ORIENTATION: LOWER;LEFT

## 2019-10-23 ASSESSMENT — PAIN DESCRIPTION - LOCATION: LOCATION: BACK

## 2019-10-23 ASSESSMENT — PAIN DESCRIPTION - PAIN TYPE: TYPE: CHRONIC PAIN

## 2019-10-23 ASSESSMENT — PAIN SCALES - GENERAL: PAINLEVEL_OUTOF10: 7

## 2019-10-25 ENCOUNTER — HOSPITAL ENCOUNTER (OUTPATIENT)
Dept: PHYSICAL THERAPY | Age: 84
Setting detail: THERAPIES SERIES
Discharge: HOME | End: 2019-10-25
Payer: MEDICARE

## 2019-10-25 PROCEDURE — G0283 ELEC STIM OTHER THAN WOUND: HCPCS

## 2019-10-25 PROCEDURE — 97110 THERAPEUTIC EXERCISES: CPT

## 2019-10-25 ASSESSMENT — PAIN DESCRIPTION - LOCATION: LOCATION: BACK

## 2019-10-25 ASSESSMENT — PAIN DESCRIPTION - ORIENTATION: ORIENTATION: LOWER;LEFT

## 2019-10-25 ASSESSMENT — PAIN SCALES - GENERAL: PAINLEVEL_OUTOF10: 3

## 2019-10-25 ASSESSMENT — PAIN DESCRIPTION - PAIN TYPE: TYPE: CHRONIC PAIN

## 2019-10-29 ENCOUNTER — HOSPITAL ENCOUNTER (OUTPATIENT)
Dept: PHYSICAL THERAPY | Age: 84
Setting detail: THERAPIES SERIES
Discharge: HOME | End: 2019-10-29
Payer: MEDICARE

## 2019-10-29 PROCEDURE — 97110 THERAPEUTIC EXERCISES: CPT

## 2019-10-29 PROCEDURE — G0283 ELEC STIM OTHER THAN WOUND: HCPCS

## 2019-10-29 ASSESSMENT — PAIN SCALES - GENERAL: PAINLEVEL_OUTOF10: 4

## 2019-10-31 ENCOUNTER — HOSPITAL ENCOUNTER (OUTPATIENT)
Dept: PHYSICAL THERAPY | Age: 84
Setting detail: THERAPIES SERIES
Discharge: HOME | End: 2019-10-31
Payer: MEDICARE

## 2019-10-31 PROCEDURE — 97032 APPL MODALITY 1+ESTIM EA 15: CPT

## 2019-10-31 PROCEDURE — 97110 THERAPEUTIC EXERCISES: CPT

## 2019-10-31 ASSESSMENT — PAIN DESCRIPTION - PAIN TYPE: TYPE: CHRONIC PAIN

## 2019-10-31 ASSESSMENT — PAIN DESCRIPTION - LOCATION: LOCATION: BACK

## 2019-10-31 ASSESSMENT — PAIN SCALES - GENERAL: PAINLEVEL_OUTOF10: 4

## 2019-10-31 ASSESSMENT — PAIN DESCRIPTION - ORIENTATION: ORIENTATION: LEFT;LOWER

## 2019-11-04 ENCOUNTER — HOSPITAL ENCOUNTER (OUTPATIENT)
Dept: PHYSICAL THERAPY | Age: 84
Setting detail: THERAPIES SERIES
Discharge: HOME | End: 2019-11-04
Payer: MEDICARE

## 2019-11-04 PROCEDURE — G0283 ELEC STIM OTHER THAN WOUND: HCPCS

## 2019-11-04 PROCEDURE — 97110 THERAPEUTIC EXERCISES: CPT

## 2019-11-04 ASSESSMENT — PAIN DESCRIPTION - PAIN TYPE: TYPE: CHRONIC PAIN

## 2019-11-04 ASSESSMENT — PAIN SCALES - GENERAL: PAINLEVEL_OUTOF10: 4

## 2019-11-04 ASSESSMENT — PAIN DESCRIPTION - LOCATION: LOCATION: BACK

## 2019-11-04 ASSESSMENT — PAIN DESCRIPTION - ORIENTATION: ORIENTATION: LOWER;LEFT

## 2019-11-05 ENCOUNTER — OFFICE VISIT (OUTPATIENT)
Dept: PHYSICAL MEDICINE AND REHAB | Age: 84
End: 2019-11-05
Payer: MEDICARE

## 2019-11-05 VITALS
BODY MASS INDEX: 30.8 KG/M2 | SYSTOLIC BLOOD PRESSURE: 110 MMHG | HEIGHT: 67 IN | HEART RATE: 78 BPM | DIASTOLIC BLOOD PRESSURE: 68 MMHG | WEIGHT: 196.21 LBS

## 2019-11-05 DIAGNOSIS — M47.816 LUMBAR SPONDYLOSIS: Primary | ICD-10-CM

## 2019-11-05 DIAGNOSIS — M41.85 OTHER FORM OF SCOLIOSIS OF THORACOLUMBAR SPINE: ICD-10-CM

## 2019-11-05 PROCEDURE — 1036F TOBACCO NON-USER: CPT | Performed by: PHYSICAL MEDICINE & REHABILITATION

## 2019-11-05 PROCEDURE — G8427 DOCREV CUR MEDS BY ELIG CLIN: HCPCS | Performed by: PHYSICAL MEDICINE & REHABILITATION

## 2019-11-05 PROCEDURE — G8598 ASA/ANTIPLAT THER USED: HCPCS | Performed by: PHYSICAL MEDICINE & REHABILITATION

## 2019-11-05 PROCEDURE — G8417 CALC BMI ABV UP PARAM F/U: HCPCS | Performed by: PHYSICAL MEDICINE & REHABILITATION

## 2019-11-05 PROCEDURE — G8484 FLU IMMUNIZE NO ADMIN: HCPCS | Performed by: PHYSICAL MEDICINE & REHABILITATION

## 2019-11-05 PROCEDURE — 99213 OFFICE O/P EST LOW 20 MIN: CPT | Performed by: PHYSICAL MEDICINE & REHABILITATION

## 2019-11-05 PROCEDURE — 4040F PNEUMOC VAC/ADMIN/RCVD: CPT | Performed by: PHYSICAL MEDICINE & REHABILITATION

## 2019-11-05 PROCEDURE — 1123F ACP DISCUSS/DSCN MKR DOCD: CPT | Performed by: PHYSICAL MEDICINE & REHABILITATION

## 2019-11-07 ENCOUNTER — HOSPITAL ENCOUNTER (OUTPATIENT)
Dept: PHYSICAL THERAPY | Age: 84
Setting detail: THERAPIES SERIES
Discharge: HOME | End: 2019-11-07
Payer: MEDICARE

## 2019-11-07 PROCEDURE — G0283 ELEC STIM OTHER THAN WOUND: HCPCS

## 2019-11-07 PROCEDURE — 97110 THERAPEUTIC EXERCISES: CPT

## 2019-11-07 ASSESSMENT — PAIN DESCRIPTION - ORIENTATION: ORIENTATION: LOWER;LEFT

## 2019-11-07 ASSESSMENT — PAIN DESCRIPTION - LOCATION: LOCATION: BACK

## 2019-11-07 ASSESSMENT — PAIN SCALES - GENERAL: PAINLEVEL_OUTOF10: 3

## 2019-11-07 ASSESSMENT — PAIN DESCRIPTION - PAIN TYPE: TYPE: CHRONIC PAIN

## 2019-11-12 ENCOUNTER — HOSPITAL ENCOUNTER (OUTPATIENT)
Dept: PHYSICAL THERAPY | Age: 84
Setting detail: THERAPIES SERIES
Discharge: HOME | End: 2019-11-12
Payer: MEDICARE

## 2019-11-12 PROCEDURE — 97110 THERAPEUTIC EXERCISES: CPT

## 2019-11-14 ENCOUNTER — HOSPITAL ENCOUNTER (OUTPATIENT)
Dept: PHYSICAL THERAPY | Age: 84
Setting detail: THERAPIES SERIES
Discharge: HOME | End: 2019-11-14
Payer: MEDICARE

## 2019-11-14 PROCEDURE — 97110 THERAPEUTIC EXERCISES: CPT

## 2019-11-14 ASSESSMENT — PAIN DESCRIPTION - ORIENTATION: ORIENTATION: LOWER;LEFT

## 2019-11-14 ASSESSMENT — PAIN DESCRIPTION - LOCATION: LOCATION: BACK

## 2019-11-14 ASSESSMENT — PAIN DESCRIPTION - PAIN TYPE: TYPE: CHRONIC PAIN

## 2019-11-14 ASSESSMENT — PAIN SCALES - GENERAL: PAINLEVEL_OUTOF10: 2

## 2019-12-10 ENCOUNTER — TELEPHONE (OUTPATIENT)
Dept: FAMILY MEDICINE CLINIC | Age: 84
End: 2019-12-10

## 2020-06-24 ENCOUNTER — TELEPHONE (OUTPATIENT)
Dept: FAMILY MEDICINE CLINIC | Age: 85
End: 2020-06-24

## 2020-07-17 ENCOUNTER — NURSE ONLY (OUTPATIENT)
Dept: LAB | Age: 85
End: 2020-07-17

## 2020-07-17 LAB
BASOPHILS # BLD: 1.3 %
BASOPHILS ABSOLUTE: 0.1 THOU/MM3 (ref 0–0.1)
EOSINOPHIL # BLD: 4.8 %
EOSINOPHILS ABSOLUTE: 0.3 THOU/MM3 (ref 0–0.4)
ERYTHROCYTE [DISTWIDTH] IN BLOOD BY AUTOMATED COUNT: 12.5 % (ref 11.5–14.5)
ERYTHROCYTE [DISTWIDTH] IN BLOOD BY AUTOMATED COUNT: 43.6 FL (ref 35–45)
HCT VFR BLD CALC: 41.7 % (ref 42–52)
HEMOGLOBIN: 13.7 GM/DL (ref 14–18)
IMMATURE GRANS (ABS): 0.01 THOU/MM3 (ref 0–0.07)
IMMATURE GRANULOCYTES: 0.2 %
LYMPHOCYTES # BLD: 44.3 %
LYMPHOCYTES ABSOLUTE: 2.3 THOU/MM3 (ref 1–4.8)
MCH RBC QN AUTO: 31.6 PG (ref 26–33)
MCHC RBC AUTO-ENTMCNC: 32.9 GM/DL (ref 32.2–35.5)
MCV RBC AUTO: 96.1 FL (ref 80–94)
MONOCYTES # BLD: 11.4 %
MONOCYTES ABSOLUTE: 0.6 THOU/MM3 (ref 0.4–1.3)
NUCLEATED RED BLOOD CELLS: 0 /100 WBC
PLATELET # BLD: 150 THOU/MM3 (ref 130–400)
PMV BLD AUTO: 11.1 FL (ref 9.4–12.4)
RBC # BLD: 4.34 MILL/MM3 (ref 4.7–6.1)
SEG NEUTROPHILS: 38 %
SEGMENTED NEUTROPHILS ABSOLUTE COUNT: 2 THOU/MM3 (ref 1.8–7.7)
WBC # BLD: 5.3 THOU/MM3 (ref 4.8–10.8)

## 2020-07-19 NOTE — PROGRESS NOTES
[facility]  [facility]  Dept: [phone]  Dept Fax: [phone]    QUE Dailey is a 81 y.o.male    Pt presents for follow up of HTN, Hyperlipidemia, Hypothyroidism, CAD, COPD, GERD, Testosterone Deficiency, OA, Osteopenia. Pt feeling ok since last visit- interval history and any new issues noted below:     Pt recently saw Dr. Tunde Bartlett for stool leakage- started on Citrucel and diaper wipes. \"Seems to be helping\" at this time. Pt recently started on Efudex per Dr. 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 Glucometer readings at home are not checked regularly. The home BP readings have been in the 115-140's / 60-90 range. Pt previously had Metoprolol increased per Dr. Brandy Sainz- dose then decreased as pt became orthostatic. Also completed Echo, Tilt Table- + \"Skipped beats\"     Wt Readings from Last 3 Encounters:   07/20/20 194 lb 12.8 oz (88.4 kg)   11/05/19 196 lb 3.4 oz (89 kg)   10/22/19 196 lb 3.4 oz (89 kg)   Weight decreased  2# since last visit 10 months ago. Patient Active Problem List   Diagnosis    Essential hypertension    Irregular heart beat    Hypothyroidism (Dr. Caleb Walls)    Hyperlipidemia    Testosterone deficiency ([facility])     Sleep apnea    Coronary artery disease involving native coronary artery of native heart without angina pectoris- (Dr. Brandy Sainz)   [facility] Gastroesophageal reflux disease    Osteoarthritis of thoracolumbar spine    COPD (chronic obstructive pulmonary disease) (HCC)    Osteopenia       Current Outpatient Medications   Medication Sig Dispense Refill    fluorouracil (EFUDEX) 5 % cream Apply topically 2 times daily Apply topically 2 times daily.       naproxen (NAPROSYN) 500 MG tablet Take 1 tablet by mouth daily 90 tablet 3    furosemide (LASIX) 20 MG tablet Take 20 mg by mouth daily      Cholecalciferol (VITAMIN D3) 2000 units CAPS Take 1 capsule by mouth daily      ipratropium-albuterol (DUONEB) 0.5-2.5 (3) MG/3ML SOLN nebulizer solution Inhale 1 vial into the lungs every 4 hours as needed       vitamin B-12 (CYANOCOBALAMIN) 1000 MCG tablet Take 1,000 mcg by mouth every other day      sodium chloride (OCEAN) 0.65 % nasal spray 1 spray by Nasal route 2 times daily as needed for Congestion      albuterol sulfate HFA (PROAIR HFA) 108 (90 Base) MCG/ACT inhaler Inhale 2 puffs into the lungs every 6 hours as needed for Wheezing      atorvastatin (LIPITOR) 80 MG tablet Take 80 mg by mouth daily      Testosterone (ANDRODERM) 4 MG/24HR PT24 Place 2 mg onto the skin daily.  levothyroxine (SYNTHROID) 75 MCG tablet Take 75 mcg by mouth Daily      alendronate (FOSAMAX) 70 MG tablet Take 70 mg by mouth every 7 days Take every Wed.  metoprolol (LOPRESSOR) 25 MG tablet Take 1 tablet by mouth daily 90 tablet 3    losartan (COZAAR) 50 MG tablet Take 1 tablet by mouth daily 90 tablet 3    isosorbide mononitrate (IMDUR) 60 MG CR tablet Take 60 mg by mouth daily.  aspirin 81 MG EC tablet   Take 81 mg by mouth daily Last dose 5-7-2015 for surgery 5-      vitamin E 100 UNIT capsule   Take 400 Units by mouth 2 times daily Last dose 5-7-2015 for surgery 5-      budesonide-formoterol (SYMBICORT) 160-4.5 MCG/ACT AERO Inhale 2 puffs into the lungs 2 times daily (Patient not taking: Reported on 7/20/2020) 1 Inhaler 3     No current facility-administered medications for this visit. Review of Systems   Constitutional: Negative for chills, diaphoresis, fatigue, fever and unexpected weight change. Eyes: Negative for visual disturbance. Respiratory: Positive for shortness of breath (chronic- worse with walking long distances. ). Negative for chest tightness. Cardiovascular: Negative for chest pain, palpitations and leg swelling. Gastrointestinal: Negative for abdominal pain, anal bleeding, blood in stool, constipation, diarrhea, nausea and vomiting. Genitourinary: Negative for dysuria and hematuria. Musculoskeletal: Negative for neck pain. Neurological: Negative for dizziness, light-headedness and headaches. OBJECTIVE     /70   Pulse 56   Temp 97.5 °F (36.4 °C) (Temporal)   Resp 16   Wt 194 lb 12.8 oz (88.4 kg)   BMI 30.50 kg/m²   Body mass index is 30.5 kg/m². BP Readings from Last 3 Encounters:   07/20/20 122/70   11/05/19 110/68   10/22/19 138/70     Physical Exam  Vitals signs and nursing note reviewed. Exam conducted with a chaperone present. Constitutional:       General: He is not in acute distress. Appearance: Normal appearance. He is not ill-appearing, toxic-appearing or diaphoretic. HENT:      Head: Normocephalic and atraumatic. Right Ear: External ear normal.      Left Ear: External ear normal.      Nose: Nose normal. No rhinorrhea. Mouth/Throat:      Mouth: Mucous membranes are moist.      Pharynx: Oropharynx is clear. Eyes:      General: No scleral icterus. Right eye: No discharge. Left eye: No discharge. Extraocular Movements: Extraocular movements intact. Conjunctiva/sclera: Conjunctivae normal.      Pupils: Pupils are equal, round, and reactive to light. Neck:      Musculoskeletal: Normal range of motion. Cardiovascular:      Rate and Rhythm: Normal rate and regular rhythm. Heart sounds: Normal heart sounds. No murmur. No friction rub. No gallop. Pulmonary:      Effort: Pulmonary effort is normal. No respiratory distress. Breath sounds: Normal breath sounds. No stridor. No wheezing, rhonchi or rales. Chest:      Chest wall: No tenderness. Abdominal:      General: Abdomen is flat. Bowel sounds are normal. There is no distension. Palpations: Abdomen is soft. There is no mass. Tenderness: There is no abdominal tenderness. There is no guarding or rebound. Hernia: No hernia is present. Musculoskeletal: Normal range of motion.          General: No swelling, deformity or signs of injury. Skin:     General: Skin is warm and dry. Coloration: Skin is not jaundiced or pale. Findings: No bruising, erythema, lesion or rash. Neurological:      General: No focal deficit present. Mental Status: He is alert and oriented to person, place, and time. Mental status is at baseline. Motor: No weakness. Coordination: Coordination normal.      Gait: Gait normal.   Psychiatric:         Mood and Affect: Mood normal.         Behavior: Behavior normal.         Thought Content:  Thought content normal.         Judgment: Judgment normal.         Component      Latest Ref Rng & Units 7/17/2020 5/29/2020   WBC      4.8 - 10.8 thou/mm3 5.3    RBC      4.70 - 6.10 mill/mm3 4.34 (L)    Hemoglobin Quant      14.0 - 18.0 gm/dl 13.7 (L)    Hematocrit      42.0 - 52.0 % 41.7 (L)    MCV      80.0 - 94.0 fL 96.1 (H)    MCH      26.0 - 33.0 pg 31.6    MCHC      32.2 - 35.5 gm/dl 32.9    RDW-CV      11.5 - 14.5 % 12.5    RDW-SD      35.0 - 45.0 fL 43.6    Platelet Count      505 - 400 thou/mm3 150    MPV      9.4 - 12.4 fL 11.1    Seg Neutrophils      % 38.0    Lymphocytes      % 44.3    Monocytes      % 11.4    Eosinophils      % 4.8    Basophils      % 1.3    Immature Granulocytes      % 0.2    Segs Absolute      1.8 - 7.7 thou/mm3 2.0    Lymphocytes Absolute      1.0 - 4.8 thou/mm3 2.3    Monocytes Absolute      0.4 - 1.3 thou/mm3 0.6    Eosinophils Absolute      0.0 - 0.4 thou/mm3 0.3    Basophils Absolute      0.0 - 0.1 thou/mm3 0.1    Immature Grans (Abs)      0.00 - 0.07 thou/mm3 0.01    Nucleated Red Blood Cells      /100 wbc 0    Sodium      135 - 145 mEq/L  141   Potassium      3.6 - 5.0 mEq/L  4.2   Chloride      101 - 111 mEq/L  107   CO2      21 - 32 mEq/L  30   Anion Gap      4 - 12  4   Glucose      70 - 110 mg/dL  84   BUN      7 - 20 mg/dL  14   Creatinine      0.60 - 1.30 mg/dL  0.93   Creatinine Clearance        >60   AST      15 - 41 IU/L  20   Alk Phos      41 - 137 IU/L  45   Bilirubin      0.2 - 1.0 mg/dL  0.7   Calcium      8.8 - 10.5 mg/dL  8.80   Albumin      3.5 - 5.0 gm/dL  3.8   Total Protein      6.2 - 8.0 g/dL  6.1 (L)   Albumin/Globulin Ratio      1.5 - 2.5  1.7   ALT      10 - 40 IU/L  19   LDL Direct      mg/dL  60   Cholesterol      <200 mg/dL  104   Triglycerides      <150 mg/dL  123   HDL Cholesterol      mg/dL  26 (L)   CHOLESTEROL/HDL RELATIVE RISK      4.0 - 5.0  4.0   Direct-LDL / HDL Risk      <3.1  2.3   VLDL      <39 mg/dL  24   Magnesium      1.8 - 2.5 mg/dL  2.0       Immunization History   Administered Date(s) Administered    Influenza 08/26/2015    Influenza Virus Vaccine 11/01/2011, 10/01/2012, 10/01/2013, 09/23/2014, 10/01/2015, 10/11/2017    Influenza Whole 10/01/2010    Influenza, High Dose (Fluzone 65 yrs and older) 10/09/2018    Influenza, Quadv, IM, (6 mo and older Fluzone, Flulaval, Fluarix and 3 yrs and older Afluria) 10/13/2016    Pneumococcal Conjugate 13-valent ([id number]) 05/18/2015    Pneumococcal Polysaccharide ([id number]) 10/01/2005    Td, unspecified formulation 07/20/2012    Tdap (Boostrix, Adacel) 10/13/2016, 04/19/2017    Zoster Live (Zostavax) 04/30/2012       Health Maintenance   Topic Date Due    Shingles Vaccine (2 of 3) 06/25/2012    TSH testing  08/06/2015    Flu vaccine (1) 09/01/2020    Lipid screen  05/29/2021    Potassium monitoring  05/29/2021    Creatinine monitoring  05/29/2021    DTaP/Tdap/Td vaccine (3 - Td) 04/19/2027    Pneumococcal 65+ years Vaccine  Completed    Hepatitis A vaccine  Aged Out    Hepatitis B vaccine  Aged Out    Hib vaccine  Aged Out    Meningococcal (ACWY) vaccine  Aged Out       AAA ultrasound (Male, 65-75, smoked ever) indicated at this time? NO, due to age. CT Lung Screen (55-80, 30 pk-yrs, smoking or quit <15 years) indicated at this time? No tobacco use for past 39 years. No future appointments. ASSESSMENT       Diagnosis Orders   1. Essential hypertension     2. Hyperlipidemia, unspecified hyperlipidemia type     3. Hypothyroidism, unspecified type     4. Coronary artery disease involving native coronary artery of native heart without angina pectoris- (Dr. Evita Carson)     5. Chronic obstructive pulmonary disease, unspecified COPD type ([facility] [address]     6. Testosterone deficiency (Dr. Blanquita Mcneil)      7. Gastroesophageal reflux disease, esophagitis presence not specified     8. Osteoarthritis of thoracolumbar spine, unspecified spinal osteoarthritis complication status  naproxen (NAPROSYN) 500 MG tablet   9. Osteopenia, unspecified location     10. Anemia, unspecified type  Hemoglobin and Hematocrit, Blood       PLAN      Encouraged annual FLU VACCINE after October 1st.    Pt to track down dates of previous SHINGLES SHOTS [facility]) and drop off to office as he states he completed series in past 12 months. COLONOSCOPY done 10/17/2014 per Dr. Valeriy Lemus- no further mandated. (updated 7/20/2020)  Declines FIT testing at this time (updated 7/20/2020)  DEXA management per [facility]- done 6/17/2016, ? Sooner?- severe Osteopenia- pt to check on date of last DEXA SCAN and have faxed to our office. (updated 7/20/2020)  PSA remains on hold at this time due to age and fact that PSA has always been very low in past. (updated 7/20/2020)  [address] [facility]- all ok- please have them fax copy of office visit to our office.  (updated 7/20/2020)  Thyroid management per Dr. Ketan Salas in [place], [place]- to follow up 10/2020 (updated 7/20/2020)  Follow up with Dr. Kamran Adams, Dr. Evita Carson, Dr. Valeriy Lemus, and Dr. Ketan Salas as planned. 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 Continue current medicines.   Refills   Follow up in 6 months.                   Electronically signed by Payton Moon MD on 7/20/2020 at 2:24 PM

## 2020-07-20 ENCOUNTER — OFFICE VISIT (OUTPATIENT)
Dept: FAMILY MEDICINE CLINIC | Age: 85
End: 2020-07-20
Payer: OTHER GOVERNMENT

## 2020-07-20 VITALS
BODY MASS INDEX: 30.5 KG/M2 | DIASTOLIC BLOOD PRESSURE: 70 MMHG | WEIGHT: 194.8 LBS | RESPIRATION RATE: 16 BRPM | HEART RATE: 56 BPM | TEMPERATURE: 97.5 F | SYSTOLIC BLOOD PRESSURE: 122 MMHG

## 2020-07-20 PROCEDURE — G8427 DOCREV CUR MEDS BY ELIG CLIN: HCPCS | Performed by: FAMILY MEDICINE

## 2020-07-20 PROCEDURE — 99214 OFFICE O/P EST MOD 30 MIN: CPT | Performed by: FAMILY MEDICINE

## 2020-07-20 PROCEDURE — 4040F PNEUMOC VAC/ADMIN/RCVD: CPT | Performed by: FAMILY MEDICINE

## 2020-07-20 PROCEDURE — 3023F SPIROM DOC REV: CPT | Performed by: FAMILY MEDICINE

## 2020-07-20 PROCEDURE — 1036F TOBACCO NON-USER: CPT | Performed by: FAMILY MEDICINE

## 2020-07-20 PROCEDURE — G8926 SPIRO NO PERF OR DOC: HCPCS | Performed by: FAMILY MEDICINE

## 2020-07-20 PROCEDURE — 1123F ACP DISCUSS/DSCN MKR DOCD: CPT | Performed by: FAMILY MEDICINE

## 2020-07-20 PROCEDURE — G8417 CALC BMI ABV UP PARAM F/U: HCPCS | Performed by: FAMILY MEDICINE

## 2020-07-20 RX ORDER — FLUOROURACIL 50 MG/G
CREAM TOPICAL 2 TIMES DAILY
COMMUNITY
End: 2021-03-01 | Stop reason: ALTCHOICE

## 2020-07-20 RX ORDER — LEVOTHYROXINE SODIUM 0.07 MG/1
75 TABLET ORAL DAILY
Qty: 90 TABLET | Refills: 3 | Status: CANCELLED | OUTPATIENT
Start: 2020-07-20

## 2020-07-20 RX ORDER — NAPROXEN 500 MG/1
500 TABLET ORAL DAILY
Qty: 90 TABLET | Refills: 3 | Status: SHIPPED | OUTPATIENT
Start: 2020-07-20 | End: 2021-08-16 | Stop reason: SDUPTHER

## 2020-07-20 ASSESSMENT — PATIENT HEALTH QUESTIONNAIRE - PHQ9
SUM OF ALL RESPONSES TO PHQ9 QUESTIONS 1 & 2: 0
SUM OF ALL RESPONSES TO PHQ QUESTIONS 1-9: 0
SUM OF ALL RESPONSES TO PHQ QUESTIONS 1-9: 0
2. FEELING DOWN, DEPRESSED OR HOPELESS: 0
1. LITTLE INTEREST OR PLEASURE IN DOING THINGS: 0

## 2020-07-20 ASSESSMENT — ENCOUNTER SYMPTOMS
CONSTIPATION: 0
CHEST TIGHTNESS: 0
ANAL BLEEDING: 0
DIARRHEA: 0
ABDOMINAL PAIN: 0
SHORTNESS OF BREATH: 1
NAUSEA: 0
VOMITING: 0
BLOOD IN STOOL: 0

## 2020-08-06 RX ORDER — POTASSIUM CHLORIDE 750 MG/1
TABLET, FILM COATED, EXTENDED RELEASE ORAL
COMMUNITY
Start: 2020-05-19 | End: 2022-06-22

## 2020-12-21 ENCOUNTER — TELEPHONE (OUTPATIENT)
Dept: FAMILY MEDICINE CLINIC | Age: 85
End: 2020-12-21

## 2020-12-21 NOTE — TELEPHONE ENCOUNTER
Message left on nurse MENDEL. Wonders if you would recommend that he receive the COVID vaccine when available.

## 2021-01-18 ENCOUNTER — APPOINTMENT (OUTPATIENT)
Dept: CT IMAGING | Age: 86
End: 2021-01-18
Payer: MEDICARE

## 2021-01-18 ENCOUNTER — HOSPITAL ENCOUNTER (EMERGENCY)
Age: 86
Discharge: HOME | End: 2021-01-18
Attending: FAMILY MEDICINE
Payer: MEDICARE

## 2021-01-18 VITALS
SYSTOLIC BLOOD PRESSURE: 144 MMHG | WEIGHT: 194 LBS | RESPIRATION RATE: 17 BRPM | TEMPERATURE: 99 F | DIASTOLIC BLOOD PRESSURE: 80 MMHG | HEART RATE: 64 BPM | BODY MASS INDEX: 30.45 KG/M2 | HEIGHT: 67 IN | OXYGEN SATURATION: 95 %
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
## 2021-01-18 DIAGNOSIS — S00.81XA FOREHEAD ABRASION, INITIAL ENCOUNTER: ICD-10-CM

## 2021-01-18 DIAGNOSIS — S09.90XA CLOSED HEAD INJURY, INITIAL ENCOUNTER: Primary | ICD-10-CM

## 2021-01-18 LAB
EKG ATRIAL RATE: 76 BPM
EKG P AXIS: 47 DEGREES
EKG P-R INTERVAL: 170 MS
EKG Q-T INTERVAL: 376 MS
EKG QRS DURATION: 110 MS
EKG QTC CALCULATION (BAZETT): 423 MS
EKG R AXIS: 4 DEGREES
EKG VENTRICULAR RATE: 76 BPM

## 2021-01-18 PROCEDURE — 99284 EMERGENCY DEPT VISIT MOD MDM: CPT

## 2021-01-18 PROCEDURE — 6370000000 HC RX 637 (ALT 250 FOR IP)

## 2021-01-18 PROCEDURE — 72125 CT NECK SPINE W/O DYE: CPT

## 2021-01-18 PROCEDURE — 70450 CT HEAD/BRAIN W/O DYE: CPT

## 2021-01-18 PROCEDURE — 93005 ELECTROCARDIOGRAM TRACING: CPT | Performed by: FAMILY MEDICINE

## 2021-01-18 RX ORDER — GINSENG 100 MG
CAPSULE ORAL
Status: COMPLETED
Start: 2021-01-18 | End: 2021-01-18

## 2021-01-18 RX ADMIN — BACITRACIN: 500 OINTMENT TOPICAL at 14:19

## 2021-01-18 ASSESSMENT — ENCOUNTER SYMPTOMS
ABDOMINAL PAIN: 0
VOMITING: 0
BACK PAIN: 0
COUGH: 0
NAUSEA: 0
SHORTNESS OF BREATH: 0
WHEEZING: 0

## 2021-01-18 NOTE — ED PROVIDER NOTES
EMERGENCY DEPARTMENT ENCOUNTER     CHIEF COMPLAINT   Chief Complaint   Patient presents with    Fall        HPI   Laetsha Ugalde is a 81 y.o. male who presents after a fall as he was headed to the outpatient clinic in [phone] building this afternoon. Unfortunately he fell outside due to the slippery grounds, and suffered a head injury. There was no LOC. The onset was prior to arrival. The reason why the patient fell was accidental. The patient complains of abrasion to nose and forehead and some neck pain. The quality is throbbing. The patient has no associated extremity weakness or vision changes. REVIEW OF SYSTEMS   Neurologic:+head injury; denies dizziness or LOC   Cardiac: No Chest Pain, No syncope   Respiratory: No difficulty breathing   GI:No abdominal pain or vomiting   Integument: +forehead abrasions  See history of present illness   All other review of systems were reviewed and are otherwise negative.      PAST MEDICAL & SURGICAL HISTORY   Past Medical History:   Diagnosis Date    BCC (basal cell carcinoma), face     Nasal- Dr. Karena Bryan [place]    CAD (coronary artery disease)     [facility]    dx'd in [place] GERD (gastroesophageal reflux disease)     GERD    Hyperlipidemia     Hypertension     Hypothyroidism     Irregular heart beat     LISA (nonalcoholic steatohepatitis)     OA (osteoarthritis)     Sleep apnea     uses cpap    Testosterone deficiency       Past Surgical History:   Procedure Laterality Date    CARDIAC CATHETERIZATION  10/04/2010    CARDIAC CATHETERIZATION  2001    stent x1    COLONOSCOPY      last one 2010    EYE SURGERY  2012    bilateral cataracts    JOINT REPLACEMENT  1995    right knee    JOINT REPLACEMENT  1998    left knee    JOINT REPLACEMENT  2014 - aug.    right shoulder    MOHS SURGERY  4/13    Dr. Estefany Rodriguez  05/14/2015    Right side of scalp     MOHS SURGERY N/A 12/13/2018    MOHS REPAIR BCC DORSAL NOSE performed by Maximino Shin MD at [facility]  2009    TUMOR REMOVED    [place] SONO GUIDE NEEDLE BIOPSY  06/2016    Benign, done in [facility] Right 8/8/14    Dr. Glen Pompa - total replacement   Darrel Pack      Dr. Terrie Heimlich and Dr. Glenda Rivas L[place]      as a child   [address]        CURRENT MEDICATIONS   Current Outpatient Rx   Medication Sig Dispense Refill    potassium chloride (KLOR-CON) 10 MEQ extended release tablet TAKE 1 TABLET BY MOUTH ONCE DAILY WITH FOOD      fluorouracil (EFUDEX) 5 % cream Apply topically 2 times daily Apply topically 2 times daily.  naproxen (NAPROSYN) 500 MG tablet Take 1 tablet by mouth daily 90 tablet 3    furosemide (LASIX) 20 MG tablet Take 20 mg by mouth daily      Cholecalciferol (VITAMIN D3) 2000 units CAPS Take 1 capsule by mouth daily      ipratropium-albuterol (DUONEB) 0.5-2.5 (3) MG/3ML SOLN nebulizer solution Inhale 1 vial into the lungs every 4 hours as needed       budesonide-formoterol (SYMBICORT) 160-4.5 MCG/ACT AERO Inhale 2 puffs into the lungs 2 times daily (Patient not taking: Reported on 7/20/2020) 1 Inhaler 3    vitamin B-12 (CYANOCOBALAMIN) 1000 MCG tablet Take 1,000 mcg by mouth every other day      sodium chloride (OCEAN) 0.65 % nasal spray 1 spray by Nasal route 2 times daily as needed for Congestion      albuterol sulfate HFA (PROAIR HFA) 108 (90 Base) MCG/ACT inhaler Inhale 2 puffs into the lungs every 6 hours as needed for Wheezing      atorvastatin (LIPITOR) 80 MG tablet Take 80 mg by mouth daily      Testosterone (ANDRODERM) 4 MG/24HR PT24 Place 2 mg onto the skin daily.  levothyroxine (SYNTHROID) 75 MCG tablet Take 75 mcg by mouth Daily      alendronate (FOSAMAX) 70 MG tablet Take 70 mg by mouth every 7 days Take every Wed.       metoprolol (LOPRESSOR) 25 MG tablet Take 1 tablet by mouth daily 90 tablet 3    losartan (COZAAR) 50 MG tablet Take 1 tablet by mouth daily 90 tablet 3    isosorbide mononitrate (IMDUR) 60 MG CR tablet Take 60 mg by mouth daily.  aspirin 81 MG EC tablet   Take 81 mg by mouth daily Last dose 2015 for surgery 5-      vitamin E 100 UNIT capsule   Take 400 Units by mouth 2 times daily Last dose 2015 for surgery 5-          ALLERGIES   Allergies   Allergen Reactions    Lisinopril Other (See Comments)     Cough      Iv Dye [Iodides] Rash        SOCIAL & FAMILY HISTORY   Social History     Socioeconomic History    Marital status:      Spouse name: None    Number of children: None    Years of education: None    Highest education level: None   Occupational History    None   Social Needs    Financial resource strain: None    Food insecurity     Worry: None     Inability: None    Transportation needs     Medical: None     Non-medical: None   Tobacco Use    Smoking status: Former Smoker     Packs/day: 3.00     Years: 20.00     Pack years: 60.00     Quit date: 10/9/1980     Years since quittin.3    Smokeless tobacco: Never Used   Substance and Sexual Activity    Alcohol use: Yes     Alcohol/week: 0.0 standard drinks     Comment: social    Drug use: No    Sexual activity: Yes     Partners: Female   Lifestyle    Physical activity     Days per week: None     Minutes per session: None    Stress: None   Relationships    Social connections     Talks on phone: None     Gets together: None     Attends Mandaen service: None     Active member of club or organization: None     Attends meetings of clubs or organizations: None     Relationship status: None    Intimate partner violence     Fear of current or ex partner: None     Emotionally abused: None     Physically abused: None     Forced sexual activity: None   Other Topics Concern    None   Social History Narrative    None      History reviewed.  No pertinent family history. PHYSICAL EXAM   VITAL SIGNS: BP (!) 144/80   Pulse 64   Temp 99 °F (37.2 °C) (Oral)   Resp 17   Ht 5' 7\" (1.702 m)   Wt 194 lb (88 kg)   SpO2 95%   BMI 30.38 kg/m²    Constitutional: Well developed, well nourished, no acute distress   Eyes: Pupils equally round and react to light, sclera nonicteric   HENT: left forehead contusion vs abrasion 1x4 cm, no arterial bleeding, no trismus   Neck: supple, no JVD, mild posterior neck tenderness   Respiratory: Lungs Clear, no retractions   Cardiovascular: Reg rate, normal rhythm, no murmurs   Vascular: DP pulses 2+ equal bilaterally   GI: Soft, nontender, normal bowel sounds   Musculoskeletal: No edema, no deformity of pelvis or hips or shoulder joints   Integument: Well hydrated, no petechiae   Neurologic: Alert & oriented, no slurred speech, 5/5  strength noted. Normal gait. Psych: Pleasant affect     RADIOLOGY   CT Head WO Contrast   Final Result   No acute intracranial findings. **This report has been created using voice recognition software. It may contain minor errors which are inherent in voice recognition technology. **      Final report electronically signed by Dr. Chinmay Mayo on 1/18/2021 1:42 PM      CT Cervical Spine WO Contrast   Final Result    No fracture or subluxation. **This report has been created using voice recognition software. It may contain minor errors which are inherent in voice recognition technology. **      Final report electronically signed by Dr. Chinmay Mayo on 1/18/2021 1:48 PM           Labs Reviewed - No data to display     ED [facility]   Pertinent Labs & Imaging studies reviewed and interpreted. (See chart for details)   Vitals:    01/18/21 1406   BP: (!) 144/80   Pulse: 64   Resp: 17   Temp:    SpO2: 95%        Differential Diagnosis: closed head injury; Fracture, Dislocation, Dehydration, other. FINAL IMPRESSION   1.  Closed head injury, initial encounter 2. Forehead abrasion, initial encounter    3. Fall, initial encounter         PLAN   MDM - mechanical fall with head injury. Pt's CT imaging studies both returned negative for acute findings. Pt's wounds were cleansed. Pt stable for dc at this time.      Electronically signed by: Santy Alan, 1/18/2021 2:31 PM        Manjit Montejo MD  01/18/21 [id number]

## 2021-01-18 NOTE — ED TRIAGE NOTES
Pt to ED via EMS with c/o fall. Pt reports they were attempting to get to the door at the health department when they lost their footage and fell headfirst into the concrete. Pt denies loosing consciousness. The fall was witnessed. Pt denies pain. There does appear to be a large abrasion above their left eye brow. And also an abrasion on the pt nose. Pt VSS. EKG completed. Tele applied. Will continue to monitor.

## 2021-01-18 NOTE — ED NOTES
**This is a Medical/PA/APRN Student Note and is charted for educational purposes. The non-physician staff attested note is not to be used for billing purposes, chart documentation or to guide patient care. Please see the supervising physician/PA/APRN modifications/attestation for treatment plan/chart documentation/suggestions. This note has been reviewed and feedback has been provided to the student. **      MEDICAL STUDENT NOTE    Chief Complaint   Patient presents with    Fall     History obtained from the patient. MELISA Maynard is a 81 y.o. male who presents following a fall. Patient was at health department getting Covid vaccine today when he lost his footing stepping off a curb and fell forward. He did hit his head and has resulting abrasion, but denies LOC. Patient was feeling in his usual state of health prior to fall and denies preceding symptoms. He does report some right sided neck pain following fall. He denies dizziness, vision changes, headache, nausea, vomiting, chest pain, or shortness of breath. He denies any other pain or injuries from fall. Patient has no other concerns at this time. Review of Systems   Constitutional: Negative for appetite change, chills and fever. HENT: Negative for congestion. Eyes: Negative for visual disturbance. Respiratory: Negative for cough, shortness of breath and wheezing. Cardiovascular: Negative for chest pain and palpitations. Gastrointestinal: Negative for abdominal pain, nausea and vomiting. Musculoskeletal: Positive for neck pain (right side). Negative for arthralgias, back pain, gait problem and neck stiffness. Reports fall   Skin:        Reports abrasion to left forehead   Neurological: Negative for dizziness, tremors, seizures, syncope, weakness, numbness and headaches.            Past Medical History:   Diagnosis Date    BCC (basal cell carcinoma), face     Nasal- Dr. Maura Pereira, [facility]    CAD (coronary artery disease)  [facility] 12/2017    dx'd in [place] GERD (gastroesophageal reflux disease)     GERD    Hyperlipidemia     Hypertension     Hypothyroidism     Irregular heart beat     LISA (nonalcoholic steatohepatitis)     OA (osteoarthritis)     Sleep apnea     uses cpap    Testosterone deficiency        Past Surgical History:   Procedure Laterality Date    CARDIAC CATHETERIZATION  10/04/2010    CARDIAC CATHETERIZATION  2001    stent x1    COLONOSCOPY      last one 2010    EYE SURGERY  2012    bilateral cataracts    JOINT REPLACEMENT  1995    right knee    JOINT REPLACEMENT  1998    left knee    JOINT REPLACEMENT  2014 - aug.    right shoulder    MOHS SURGERY  4/13    Dr. Patty Harada  05/14/2015    Right side of scalp     MOHS SURGERY N/A 12/13/2018    MOHS REPAIR BCC DORSAL NOSE performed by Monika Desai MD at [address] [place] [address]  2009    TUMOR REMOVED    [place] SONO GUIDE NEEDLE BIOPSY  06/2016    Benign, done in [facility] Right 8/8/14    Dr. Hodan Epstein - total replacement   Mari King Klein and Dr. Julissa Valiente      as a child   Ryan Edilberto?? No current facility-administered medications for this encounter. Current Outpatient Medications:     potassium chloride (KLOR-CON) 10 MEQ extended release tablet, TAKE 1 TABLET BY MOUTH ONCE DAILY WITH FOOD, Disp: , Rfl:     fluorouracil (EFUDEX) 5 % cream, Apply topically 2 times daily Apply topically 2 times daily. , Disp: , Rfl:     naproxen (NAPROSYN) 500 MG tablet, Take 1 tablet by mouth daily, Disp: 90 tablet, Rfl: 3    furosemide (LASIX) 20 MG tablet, Take 20 mg by mouth daily, Disp: , Rfl:     Cholecalciferol (VITAMIN D3) 2000 units CAPS, Take 1 capsule by mouth daily, Disp: , Rfl:     ipratropium-albuterol (DUONEB) 0.5-2.5 (3) MG/3ML Allergies   Allergen Reactions    Lisinopril Other (See Comments)     Cough      Iv Dye [Iodides] Rash           History reviewed. No pertinent family history. Vitals:    01/18/21 1336   BP: (!) 148/86   Pulse: 69   Resp: 18   Temp:    SpO2: 94%     Vital signs and nursing assessment reviewed and normal. Pulsoximetry is normal per my interpretation. Physical Exam  Constitutional:       General: He is not in acute distress. Appearance: Normal appearance. He is not ill-appearing. HENT:      Head:        Comments: 3.5 cm abrasion to left forehead with underlying swelling noted. Abrasion also noted over nasal bridge. Mouth/Throat:      Mouth: Mucous membranes are moist.   Eyes:      Extraocular Movements: Extraocular movements intact. Pupils: Pupils are equal, round, and reactive to light. Neck:      Musculoskeletal: Normal range of motion. Normal range of motion. Muscular tenderness (right lateral neck) present. No neck rigidity, torticollis or spinous process tenderness. Cardiovascular:      Rate and Rhythm: Normal rate. Rhythm irregular. Heart sounds: No murmur. Pulmonary:      Effort: Pulmonary effort is normal. No respiratory distress. Breath sounds: No stridor. No wheezing. Abdominal:      General: Bowel sounds are normal. There is no distension. Palpations: Abdomen is soft. Tenderness: There is no abdominal tenderness. Musculoskeletal:         General: No swelling, tenderness or signs of injury. Comments: No tenderness to palpation of upper or lower extremities bilaterally   Skin:     General: Skin is warm. Neurological:      General: No focal deficit present. Mental Status: He is alert and oriented to person, place, and time. Cranial Nerves: No facial asymmetry. Sensory: Sensation is intact.    Psychiatric:         Mood and Affect: Mood normal.           MDM  Initial Assessment: Guzman Xiong is an 81year old male who presents following mechanical fall. On arrival, vitals were stable. Patient has 3.5 cm abrasion with underlying swelling over left forehead. No spinous process tenderness or tenderness to palpation of upper and lower extremities. Differential diagnoses include closed head trauma, skull fracture, concussion, acute muscle strain of neck. CT head and C-spine were obtained and revealed no acute injury. On re-evaluation, patient was resting comfortably in bed and denied any pain. Patient was taken to receive his Covid vaccine during ED course. His abrasion was cleaned and dressed with bacitracin and overlying bandage. Patient will be discharged home in stable condition, with instructions to follow up with PCP. Case and management plan discussed with Dr. Dung Palacios. Labs Reviewed - No data to display      Medications - No data to display      CT Head WO Contrast   Final Result   No acute intracranial findings. **This report has been created using voice recognition software. It may contain minor errors which are inherent in voice recognition technology. **      Final report electronically signed by Dr. Carolee Yang on 1/18/2021 1:42 PM      CT Cervical Spine WO Contrast   Final Result    No fracture or subluxation. **This report has been created using voice recognition software. It may contain minor errors which are inherent in voice recognition technology. **      Final report electronically signed by Dr. Carolee Yang on 1/18/2021 1:48 PM            Final diagnoses:   None           New Prescriptions    No medications on file         Condition: condition: stable      Disposition: Discharge to home    Electronically signed by Kate Juarez on 1/18/2021 at 2:11 PM           **This is a Medical/PA/APRN Student Note and is charted for educational purposes. The non-physician staff attested note is not to be used for billing purposes, chart documentation or to guide patient care.  Please see the supervising physician/PA/APRN modifications/attestation for treatment plan/chart documentation/suggestions. This note has been reviewed and feedback has been provided to the student.  Francis Angel  01/18/21 [id number]

## 2021-01-18 NOTE — ED NOTES
Bed: 007A  Expected date: 1/18/21  Expected time: 12:09 PM  Means of arrival: [facility]  Comments:     Ishmael Delacruz RN  01/18/21 1107

## 2021-01-18 NOTE — ED NOTES
This RN attempted to call Julián Atkinson RN [facility] Director of nursing and no answer.       Merrill Valentine RN  01/18/21 [id number]

## 2021-01-18 NOTE — ED NOTES
Wound care completed. Informed patient RN is going to attempt to have patient receive COVID vaccine today.       Kristopher Mcgill RN  01/18/21 1179

## 2021-01-20 ENCOUNTER — TELEPHONE (OUTPATIENT)
Dept: FAMILY MEDICINE CLINIC | Age: 86
End: 2021-01-20

## 2021-01-24 NOTE — PROGRESS NOTES
[facility]  [facility]  Dept: [phone]  Dept Fax: [phone]    SUBJECTIVE     Kike Crum is a 81 y.o.male    Pt presents for follow up of HTN, Hyperlipidemia, Hypothyroidism, CAD, COPD, GERD, Testosterone Deficiency, OA, Osteopenia. Pt feeling ok since last visit- interval history and any new issues noted below:     Pt going to [facility] for COVID vaccine- pt tripped over curb- EMS called- to [facility] ED- work-up negative- still with some occasional light-headed- no significant improvement per pt report. Pt admits to little water intake at this time. Pt continues seeing Dr. Juanpablo Brandt for Thyroid management. Last appt 10/2020- to follow up annually. Otherwise, pt doing ok at this time- denies any new complaints. Glucometer readings at home are not needed. The home BP readings have been in the 139-152 / 68-83 range. Checked 4x/ in past 3 months. Wt Readings from Last 3 Encounters:   01/28/21 198 lb 3.2 oz (89.9 kg)   01/18/21 194 lb (88 kg)   07/20/20 194 lb 12.8 oz (88.4 kg)   Weight increased 4# since last visit 6 months ago.      Patient Active Problem List   Diagnosis    Essential hypertension    Irregular heart beat    Hypothyroidism (Dr. Kimmie Acevedo)    Hyperlipidemia    Testosterone deficiency ([facility])     Sleep apnea    Coronary artery disease involving native coronary artery of native heart without angina pectoris- (Dr. Hermes Pastrana)   Alberto Haley Gastroesophageal reflux disease    Osteoarthritis of thoracolumbar spine    COPD (chronic obstructive pulmonary disease) (HCC)    Osteopenia       Current Outpatient Medications   Medication Sig Dispense Refill    metoprolol succinate (TOPROL XL) 25 MG extended release tablet Take 25 mg by mouth daily      potassium chloride (KLOR-CON) 10 MEQ extended release tablet TAKE 1 TABLET BY MOUTH ONCE DAILY WITH FOOD  fluorouracil (EFUDEX) 5 % cream Apply topically 2 times daily Apply topically 2 times daily.  naproxen (NAPROSYN) 500 MG tablet Take 1 tablet by mouth daily 90 tablet 3    furosemide (LASIX) 20 MG tablet Take 20 mg by mouth daily      Cholecalciferol (VITAMIN D3) 2000 units CAPS Take 1 capsule by mouth daily      ipratropium-albuterol (DUONEB) 0.5-2.5 (3) MG/3ML SOLN nebulizer solution Inhale 1 vial into the lungs every 4 hours as needed       budesonide-formoterol (SYMBICORT) 160-4.5 MCG/ACT AERO Inhale 2 puffs into the lungs 2 times daily (Patient not taking: Reported on 7/20/2020) 1 Inhaler 3    vitamin B-12 (CYANOCOBALAMIN) 1000 MCG tablet Take 1,000 mcg by mouth every other day      sodium chloride (OCEAN) 0.65 % nasal spray 1 spray by Nasal route 2 times daily as needed for Congestion      albuterol sulfate HFA (PROAIR HFA) 108 (90 Base) MCG/ACT inhaler Inhale 2 puffs into the lungs every 6 hours as needed for Wheezing      atorvastatin (LIPITOR) 80 MG tablet Take 80 mg by mouth daily      Testosterone (ANDRODERM) 4 MG/24HR PT24 Place 2 mg onto the skin daily.  levothyroxine (SYNTHROID) 75 MCG tablet Take 75 mcg by mouth Daily      alendronate (FOSAMAX) 70 MG tablet Take 70 mg by mouth every 7 days Take every Wed.  losartan (COZAAR) 50 MG tablet Take 1 tablet by mouth daily 90 tablet 3    isosorbide mononitrate (IMDUR) 60 MG CR tablet Take 60 mg by mouth daily.  aspirin 81 MG EC tablet   Take 81 mg by mouth daily Last dose 5-7-2015 for surgery 5-      vitamin E 100 UNIT capsule   Take 400 Units by mouth 2 times daily Last dose 5-7-2015 for surgery 5-       No current facility-administered medications for this visit. Review of Systems   Constitutional: Negative for chills, diaphoresis, fatigue, fever and unexpected weight change. Eyes: Negative for visual disturbance. Respiratory: Negative for chest tightness and shortness of breath. Cardiovascular: Positive for leg swelling (on Lasix and Potassium per Dr. Darin Huff. ). Negative for chest pain and palpitations. Gastrointestinal: Negative for abdominal pain, anal bleeding, blood in stool, constipation, diarrhea, nausea and vomiting. Genitourinary: Negative for dysuria and hematuria. Musculoskeletal: Negative for neck pain. Neurological: Positive for light-headedness (with position changes- when asked about water intake- \"not enough\"). Negative for dizziness and headaches. OBJECTIVE     /78   Pulse 72   Temp 96.6 °F (35.9 °C)   Resp 16   Wt 198 lb 3.2 oz (89.9 kg)   SpO2 95%   BMI 31.04 kg/m²   Body mass index is 31.04 kg/m². BP Readings from Last 3 Encounters:   01/28/21 122/78   01/18/21 (!) 144/80   07/20/20 122/70       Physical Exam  Vitals signs and nursing note reviewed. Exam conducted with a chaperone present. Constitutional:       General: He is not in acute distress. Appearance: Normal appearance. He is not ill-appearing, toxic-appearing or diaphoretic. HENT:      Head: Normocephalic and atraumatic. Right Ear: External ear normal.      Left Ear: External ear normal.      Nose: Nose normal. No rhinorrhea. Mouth/Throat:      Mouth: Mucous membranes are moist.      Pharynx: Oropharynx is clear. Eyes:      General: No scleral icterus. Right eye: No discharge. Left eye: No discharge. Extraocular Movements: Extraocular movements intact. Conjunctiva/sclera: Conjunctivae normal.      Pupils: Pupils are equal, round, and reactive to light. Neck:      Musculoskeletal: Normal range of motion. Cardiovascular:      Rate and Rhythm: Normal rate and regular rhythm. Heart sounds: Normal heart sounds. No murmur. No friction rub. No gallop. Pulmonary:      Effort: Pulmonary effort is normal. No respiratory distress. Breath sounds: Normal breath sounds. No stridor. No wheezing, rhonchi or rales.    Chest: Chest wall: No tenderness. Abdominal:      General: Abdomen is flat. Bowel sounds are normal. There is no distension. Palpations: Abdomen is soft. There is no mass. Tenderness: There is no abdominal tenderness. There is no guarding or rebound. Hernia: No hernia is present. Musculoskeletal: Normal range of motion. General: No swelling, deformity or signs of injury. Skin:     General: Skin is warm and dry. Coloration: Skin is not jaundiced or pale. Findings: No bruising, erythema, lesion or rash. Neurological:      General: No focal deficit present. Mental Status: He is alert and oriented to person, place, and time. Mental status is at baseline. Motor: No weakness. Coordination: Coordination normal.      Gait: Gait normal.   Psychiatric:         Mood and Affect: Mood normal.         Behavior: Behavior normal.         Thought Content: Thought content normal.         Judgment: Judgment normal.         CT Head WO Contrast   Impression   No acute intracranial findings.                   **This report has been created using voice recognition software. It may contain minor errors which are inherent in voice recognition technology. **       Final report electronically signed by Dr. Veronica Verde on 1/18/2021 1:42 PM        CT Cervical Spine WO Contrast  Impression    No fracture or subluxation.                   **This report has been created using voice recognition software. It may contain minor errors which are inherent in voice recognition technology. **       Final report electronically signed by Dr. Veronica Verde on 1/18/2021 1:48 PM     Component      Latest Ref Rng & Units 1/26/2021   Hemoglobin Quant      14.0 - 18.0 gm/dl 14.1   Hematocrit      42.0 - 52.0 % 43.1   TSH      0.400 - 4.200 uIU/mL 0.036 (L)   T4 Free      0.93 - 1.76 ng/dL 1.46         No results found for: LABA1C    Lab Results   Component Value Date    CHOL 104 05/29/2020    TRIG 123 05/29/2020 HDL 26 (L) 05/29/2020    LDLCALC 69 07/27/2018    LDLDIRECT 60 05/29/2020       The ASCVD Risk score (Claven ., et al., 2013) failed to calculate for the following reasons:     The 2013 ASCVD risk score is only valid for ages 36 to 78    Lab Results   Component Value Date     05/29/2020    K 4.2 05/29/2020     05/29/2020    CO2 30 05/29/2020    BUN 14 05/29/2020    CREATININE 0.93 05/29/2020    GLUCOSE 84 05/29/2020    CALCIUM 8.80 05/29/2020    PROT 6.1 (L) 05/29/2020    LABALBU 3.8 05/29/2020    BILITOT 0.7 05/29/2020    ALKPHOS 45 05/29/2020    AST 20 05/29/2020    ALT 22 09/22/2020    LABGLOM 81 (A) 11/29/2018    AGRATIO 1.7 05/29/2020       No results found for: LABMICR, [id number]    Lab Results   Component Value Date    TSH 0.036 (L) 01/26/2021    M6WFNOO 142.41 08/06/2014    T4FREE 1.46 01/26/2021       Lab Results   Component Value Date    WBC 6.2 09/22/2020    HGB 14.1 01/26/2021    HCT 43.1 01/26/2021    MCV 97.3 (H) 09/22/2020     09/22/2020       Lab Results   Component Value Date    PSA 0.13 07/13/2015    PSA 0.17 08/06/2014    PSA 0.17 06/17/2013       Immunization History   Administered Date(s) Administered    COVID-19, Moderna, 100mcg/0.5ml 01/19/2021    Influenza 08/26/2015    Influenza Virus Vaccine 11/01/2011, 10/01/2012, 10/01/2013, 09/23/2014, 10/01/2015, 10/11/2017    Influenza Whole 10/01/2010    Influenza, High Dose (Fluzone 65 yrs and older) 10/09/2018, 10/15/2020    Influenza, Quadv, IM, (6 mo and older Fluzone, Flulaval, Fluarix and 3 yrs and older Afluria) 10/13/2016    Pneumococcal Conjugate 13-valent ([id number]) 05/18/2015    Pneumococcal Polysaccharide ([id number]) 10/01/2005    Td, unspecified formulation 07/20/2012    Tdap (Boostrix, Adacel) 10/13/2016, 04/19/2017    Zoster Live (Zostavax) 04/30/2012    Zoster Recombinant (Shingrix) 10/28/2019, 05/07/2020       Health Maintenance   Topic Date Due    Annual Wellness Visit (AWV)  07/20/2020  COVID-19 Vaccine (2 of 2 - Moderna series) 02/16/2021    Lipid screen  05/29/2021    Potassium monitoring  05/29/2021    Creatinine monitoring  05/29/2021    TSH testing  01/26/2022    DTaP/Tdap/Td vaccine (3 - Td) 04/19/2027    Flu vaccine  Completed    Shingles Vaccine  Completed    Pneumococcal 65+ years Vaccine  Completed    Hepatitis A vaccine  Aged Out    Hepatitis B vaccine  Aged Out    Hib vaccine  Aged Out    Meningococcal (ACWY) vaccine  Aged Out       AAA ultrasound (Male, 65-75, smoked ever) indicated at this time? NO, due to age.   CT Lung Screen (55-80, 30 pk-yrs, smoking or quit <15 years) indicated at this time? No tobacco use for past 39 years.   Sleep Medicine referral indicated at this time (Obesity, Snoring, Daytime Somnolence, Apneic Episodes)? Known DAMASO on CPAP    Future Appointments   Date Time Provider Glen Broussard   4/14/2021  8:45 AM [facility]         ASSESSMENT       Diagnosis Orders   1. Routine general medical examination at a health care facility     2. Essential hypertension  Lipid Panel    Comprehensive Metabolic Panel   3. Hyperlipidemia, unspecified hyperlipidemia type  Lipid Panel    Comprehensive Metabolic Panel   4. Hypothyroidism, unspecified type  T4, Free    TSH without Reflex   5. Coronary artery disease involving native coronary artery of native heart without angina pectoris- (Dr. Sanchez [facility])  Jamie Prajapati MD, Cardiology, [facility]   6. Chronic obstructive pulmonary disease, unspecified COPD type ([address]     7. Testosterone deficiency ([facility]. Nestor [facility])   CBC Auto Differential   8. Osteoarthritis of thoracolumbar spine, unspecified spinal osteoarthritis complication status     9. Osteopenia, unspecified location     10. Anemia, unspecified type     11.  Gastroesophageal reflux disease, unspecified whether esophagitis present         PLAN Check with Dr. Felicity Alejo office re Metoprolol formulation (short-acting vs long acting) and dose  Home BP's- Call if > 140/90 on a regular basis. Encouraged increased water intake, drinking > 64 ounces daily. Please check with Dr. Thanh Lee office re Thyroid dose- will fax recent lab results to his office. Follow up with Dr. Wes Ahumada (Hem/ Oncology at [place] for elevated \"Protein in blood\") Dr. Kevin Pelayo, Dr. Parvez Riley, Dr. Mary Kelly, and Dr. Joe Umaña as planned.   Follow up as needed with [facility] for Pituitary Tumor- per Audrey Oneal APRN-CNP- previously stopped routine MRI's and to follow up as needed (updated 1/28/2021)  Check FLP, CBC, CMP, and FREE T4/ TSH after 5/29/2021. Continue current medicines.   No refills needed today per pt report. Pt would like referral to [facility]- will refer to Dr. Brandon Figueroa, Dr. Yusef Dobson, or Dr. Constantin Frazier. Follow up in 6 months. Preventive Health Topics:  Encouraged COVID VACCINE #2- to be done 2/15/2021. COLONOSCOPY done 10/17/2014 per Dr. Mary Kelly- no further mandated. (updated 1/28/2021)  Declines FIT testing at this time (updated 1/28/2021)  DEXA management per [place] clinic- 9/2020-  ?? Results- nothing needed at this time. On Alendronate. (updated 1/28/2021)  PSA remains on hold at this time due to age and fact that PSA has always been very low in past. (updated 1/28/2021)  [address] [facility]- all ok- to follow up in 6/2021.  To see Dr. Charly Sotomayor 2/1/2021.  (updated 1/28/2021)       Electronically signed by Shaheen Browning MD on 1/28/2021 at 10:12 AM

## 2021-01-25 ENCOUNTER — TELEPHONE (OUTPATIENT)
Dept: FAMILY MEDICINE CLINIC | Age: 86
End: 2021-01-25

## 2021-01-25 DIAGNOSIS — I10 ESSENTIAL HYPERTENSION: ICD-10-CM

## 2021-01-25 DIAGNOSIS — D64.9 ANEMIA, UNSPECIFIED TYPE: Primary | ICD-10-CM

## 2021-01-25 NOTE — TELEPHONE ENCOUNTER
Patient: Shahida [place]     Provider: Daija Mayorga     Patient would like to know if he needs to get blood work done before his appointment on 1/28/2021

## 2021-01-26 ENCOUNTER — NURSE ONLY (OUTPATIENT)
Dept: LAB | Age: 86
End: 2021-01-26

## 2021-01-26 DIAGNOSIS — I10 ESSENTIAL HYPERTENSION: ICD-10-CM

## 2021-01-26 DIAGNOSIS — D64.9 ANEMIA, UNSPECIFIED TYPE: ICD-10-CM

## 2021-01-26 LAB
HCT VFR BLD CALC: 43.1 % (ref 42–52)
HEMOGLOBIN: 14.1 GM/DL (ref 14–18)
T4 FREE: 1.46 NG/DL (ref 0.93–1.76)
TSH SERPL DL<=0.05 MIU/L-ACNC: 0.04 UIU/ML (ref 0.4–4.2)

## 2021-01-28 ENCOUNTER — OFFICE VISIT (OUTPATIENT)
Dept: FAMILY MEDICINE CLINIC | Age: 86
End: 2021-01-28
Payer: MEDICARE

## 2021-01-28 VITALS
RESPIRATION RATE: 16 BRPM | SYSTOLIC BLOOD PRESSURE: 122 MMHG | HEART RATE: 72 BPM | OXYGEN SATURATION: 95 % | DIASTOLIC BLOOD PRESSURE: 78 MMHG | BODY MASS INDEX: 31.04 KG/M2 | WEIGHT: 198.2 LBS | TEMPERATURE: 96.6 F

## 2021-01-28 DIAGNOSIS — M47.815 OSTEOARTHRITIS OF THORACOLUMBAR SPINE, UNSPECIFIED SPINAL OSTEOARTHRITIS COMPLICATION STATUS: ICD-10-CM

## 2021-01-28 DIAGNOSIS — I10 ESSENTIAL HYPERTENSION: ICD-10-CM

## 2021-01-28 DIAGNOSIS — E34.9 TESTOSTERONE DEFICIENCY: ICD-10-CM

## 2021-01-28 DIAGNOSIS — K21.9 GASTROESOPHAGEAL REFLUX DISEASE, UNSPECIFIED WHETHER ESOPHAGITIS PRESENT: ICD-10-CM

## 2021-01-28 DIAGNOSIS — D64.9 ANEMIA, UNSPECIFIED TYPE: ICD-10-CM

## 2021-01-28 DIAGNOSIS — I25.10 CORONARY ARTERY DISEASE INVOLVING NATIVE CORONARY ARTERY OF NATIVE HEART WITHOUT ANGINA PECTORIS: ICD-10-CM

## 2021-01-28 DIAGNOSIS — M85.80 OSTEOPENIA, UNSPECIFIED LOCATION: ICD-10-CM

## 2021-01-28 DIAGNOSIS — Z00.00 ROUTINE GENERAL MEDICAL EXAMINATION AT A HEALTH CARE FACILITY: Primary | ICD-10-CM

## 2021-01-28 DIAGNOSIS — E78.5 HYPERLIPIDEMIA, UNSPECIFIED HYPERLIPIDEMIA TYPE: ICD-10-CM

## 2021-01-28 DIAGNOSIS — E03.9 HYPOTHYROIDISM, UNSPECIFIED TYPE: ICD-10-CM

## 2021-01-28 DIAGNOSIS — J44.9 CHRONIC OBSTRUCTIVE PULMONARY DISEASE, UNSPECIFIED COPD TYPE (HCC): ICD-10-CM

## 2021-01-28 PROCEDURE — G0439 PPPS, SUBSEQ VISIT: HCPCS | Performed by: FAMILY MEDICINE

## 2021-01-28 PROCEDURE — 4040F PNEUMOC VAC/ADMIN/RCVD: CPT | Performed by: FAMILY MEDICINE

## 2021-01-28 PROCEDURE — 1123F ACP DISCUSS/DSCN MKR DOCD: CPT | Performed by: FAMILY MEDICINE

## 2021-01-28 PROCEDURE — G8482 FLU IMMUNIZE ORDER/ADMIN: HCPCS | Performed by: FAMILY MEDICINE

## 2021-01-28 RX ORDER — METOPROLOL SUCCINATE 25 MG/1
25 TABLET, EXTENDED RELEASE ORAL DAILY
COMMUNITY
End: 2022-06-07

## 2021-01-28 ASSESSMENT — ENCOUNTER SYMPTOMS
ANAL BLEEDING: 0
CONSTIPATION: 0
NAUSEA: 0
VOMITING: 0
SHORTNESS OF BREATH: 0
DIARRHEA: 0
BLOOD IN STOOL: 0
ABDOMINAL PAIN: 0
CHEST TIGHTNESS: 0

## 2021-01-28 ASSESSMENT — PATIENT HEALTH QUESTIONNAIRE - PHQ9
SUM OF ALL RESPONSES TO PHQ QUESTIONS 1-9: 1
SUM OF ALL RESPONSES TO PHQ QUESTIONS 1-9: 1
SUM OF ALL RESPONSES TO PHQ9 QUESTIONS 1 & 2: 1
2. FEELING DOWN, DEPRESSED OR HOPELESS: 0

## 2021-01-28 ASSESSMENT — LIFESTYLE VARIABLES
AUDIT-C TOTAL SCORE: 1
HOW OFTEN DO YOU HAVE SIX OR MORE DRINKS ON ONE OCCASION: 0
HOW OFTEN DO YOU HAVE A DRINK CONTAINING ALCOHOL: 1

## 2021-01-28 NOTE — PROGRESS NOTES
Medicare Annual Wellness Visit  Name: Lelia Brady Date: 2021   MRN: [id number] Sex: Male   Age: 81 y.o. Ethnicity: Non-/Non    : 1935 Race: Tatyana Sunshine is here for Medicare AWV, 6 Month Follow-Up (got first covid shot (no side effects) 2/15 second vac), and Fall (10 days ago- still dizzy)    Screenings for behavioral, psychosocial and functional/safety risks, and cognitive dysfunction are all negative except as indicated below. These results, as well as other patient data from the [address] form, are documented in Flowsheets linked to this Encounter. Allergies   Allergen Reactions    Lisinopril Other (See Comments)     Cough      Iv Dye [Iodides] Rash         Prior to Visit Medications    Medication Sig Taking? Authorizing Provider   metoprolol succinate (TOPROL XL) 25 MG extended release tablet Take 25 mg by mouth daily Yes Historical Provider, MD   potassium chloride (KLOR-CON) 10 MEQ extended release tablet TAKE 1 TABLET BY MOUTH ONCE DAILY WITH FOOD  Historical Provider, MD   fluorouracil (EFUDEX) 5 % cream Apply topically 2 times daily Apply topically 2 times daily.   Historical Provider, MD   naproxen (NAPROSYN) 500 MG tablet Take 1 tablet by mouth daily  Sebastian Wolf MD   furosemide (LASIX) 20 MG tablet Take 20 mg by mouth daily  Historical Provider, MD   Cholecalciferol (VITAMIN D3) 2000 units CAPS Take 1 capsule by mouth daily  Historical Provider, MD   ipratropium-albuterol (DUONEB) 0.5-2.5 (3) MG/3ML SOLN nebulizer solution Inhale 1 vial into the lungs every 4 hours as needed   Historical Provider, MD   budesonide-formoterol (SYMBICORT) 160-4.5 MCG/ACT AERO Inhale 2 puffs into the lungs 2 times daily  Patient not taking: Reported on 2020  Elise Reveles MD   vitamin B-12 (CYANOCOBALAMIN) 1000 MCG tablet Take 1,000 mcg by mouth every other day  Historical Provider, MD sodium chloride (OCEAN) 0.65 % nasal spray 1 spray by Nasal route 2 times daily as needed for Congestion  Historical Provider, MD   albuterol sulfate HFA (PROAIR HFA) 108 (90 Base) MCG/ACT inhaler Inhale 2 puffs into the lungs every 6 hours as needed for Wheezing  Historical Provider, MD   atorvastatin (LIPITOR) 80 MG tablet Take 80 mg by mouth daily  Historical Provider, MD   Testosterone (ANDRODERM) 4 MG/24HR PT24 Place 2 mg onto the skin daily. Historical Provider, MD   levothyroxine (SYNTHROID) 75 MCG tablet Take 75 mcg by mouth Daily  Historical Provider, MD   alendronate (FOSAMAX) 70 MG tablet Take 70 mg by mouth every 7 days Take every Wed. Historical Provider, MD   losartan (COZAAR) 50 MG tablet Take 1 tablet by mouth daily  Kinnie Essex, MD   isosorbide mononitrate (IMDUR) 60 MG CR tablet Take 60 mg by mouth daily.   Historical Provider, MD   aspirin 81 MG EC tablet   Take 81 mg by mouth daily Last dose 5-7-2015 for surgery 5-  Historical Provider, MD   vitamin E 100 UNIT capsule   Take 400 Units by mouth 2 times daily Last dose 5-7-2015 for surgery 5-  Historical Provider, MD         Past Medical History:   Diagnosis Date    BCC (basal cell carcinoma), face     Nasal- Dr. Terry Cooper [place]    CAD (coronary artery disease)     [facility]    dx'd in [place] GERD (gastroesophageal reflux disease)     GERD    Hyperlipidemia     Hypertension     Hypothyroidism     Irregular heart beat     LISA (nonalcoholic steatohepatitis)     OA (osteoarthritis)     Sleep apnea     uses cpap    Testosterone deficiency        Past Surgical History:   Procedure Laterality Date    CARDIAC CATHETERIZATION  10/04/2010    CARDIAC CATHETERIZATION  2001    stent x1    COLONOSCOPY      last one 2010    EYE SURGERY  2012    bilateral cataracts    JOINT REPLACEMENT  1995    right knee    JOINT REPLACEMENT  1998    left knee  JOINT REPLACEMENT  2014 - aug.    right shoulder    MOHS SURGERY  4/13    Dr. Komal Johnson  05/14/2015    Right side of scalp     MOHS SURGERY N/A 12/13/2018    MOHS REPAIR BCC DORSAL NOSE performed by Angel Cheung MD at [address] [place] [address]  2009    TUMOR REMOVED    ND SONO GUIDE NEEDLE BIOPSY  06/2016    Benign, done in [facility] Right 8/8/14    Dr. Cabrera Nipple - total replacement   Cristo Nath and Dr. Flaco Madden Dr. Rosa Clemens      as a child   [address]?? History reviewed. No pertinent family history. CareTeam (Including outside providers/suppliers regularly involved in providing care):   Patient Care Team:  Delicia Liao MD as PCP - General (Family Medicine)  Delicia Liao MD as PCP - [facility] Empaneled Provider    Wt Readings from Last 3 Encounters:   01/28/21 198 lb 3.2 oz (89.9 kg)   01/18/21 194 lb (88 kg)   07/20/20 194 lb 12.8 oz (88.4 kg)     Vitals:    01/28/21 0855   BP: 122/78   Pulse: 72   Resp: 16   Temp: 96.6 °F (35.9 °C)   SpO2: 95%   Weight: 198 lb 3.2 oz (89.9 kg)     Body mass index is 31.04 kg/m². Based upon direct observation of the patient, evaluation of cognition reveals recent and remote memory intact.     General Appearance: alert and oriented to person, place and time, well developed and well- nourished, in no acute distress  Skin: warm and dry, no rash or erythema  Head: normocephalic and atraumatic  Eyes: pupils equal, round, and reactive to light, extraocular eye movements intact, conjunctivae normal  ENT: tympanic membrane, external ear and ear canal normal bilaterally, nose without deformity, nasal mucosa and turbinates normal without polyps  Neck: supple and non-tender without mass, no thyromegaly or thyroid nodules, no cervical lymphadenopathy Pulmonary/Chest: clear to auscultation bilaterally- no wheezes, rales or rhonchi, normal air movement, no respiratory distress  Cardiovascular: normal rate, regular rhythm, normal S1 and S2, no murmurs, rubs, clicks, or gallops, distal pulses intact, no carotid bruits  Abdomen: soft, non-tender, non-distended, normal bowel sounds, no masses or organomegaly  Extremities: no cyanosis, clubbing or edema  Musculoskeletal: normal range of motion, no joint swelling, deformity or tenderness  Neurologic: reflexes normal and symmetric, no cranial nerve deficit, gait, coordination and speech normal    Patient's complete Health Risk Assessment and screening values have been reviewed and are found in Flowsheets. The following problems were reviewed today and where indicated follow up appointments were made and/or referrals ordered. Positive Risk Factor Screenings with Interventions:     Fall Risk:  Timed Up and Go Test > 12 seconds? (Complete if either Fall Risk answers are Yes): no  2 or more falls in past year?: no  Fall with injury in past year?: (!) yes(stepped off curb)  Fall Risk Interventions:    · Home safety tips provided  · Patient declines any further evaluation/treatment for this issue          General Health and ACP:  General  In general, how would you say your health is?: Good  In the past 7 days, have you experienced any of the following? New or Increased Pain, New or Increased Fatigue, Loneliness, Social Isolation, Stress or Anger?: None of These(dizzy since the fall)  Do you get the social and emotional support that you need?: Yes  Do you have a Living Will?: Yes  Advance Directives     Power of [address] Will ACP-Advance Directive ACP-Power of     Not on File Not on File Not on File Not on File      General Health Risk Interventions:  · NO issues per pt report.      Health Habits/Nutrition:  Health Habits/Nutrition  Do you exercise for at least 20 minutes 2-3 times per week?: (!) No · due to recent fall and using old pair of glasses since fall. Personalized Preventive Plan   Current Health Maintenance Status  Immunization History   Administered Date(s) Administered    COVID-19, Moderna, 100mcg/0.5ml 01/19/2021    Influenza 08/26/2015    Influenza Virus Vaccine 11/01/2011, 10/01/2012, 10/01/2013, 09/23/2014, 10/01/2015, 10/11/2017    Influenza Whole 10/01/2010    Influenza, High Dose (Fluzone 65 yrs and older) 10/09/2018, 10/15/2020    Influenza, Quadv, IM, (6 mo and older Fluzone, Flulaval, Fluarix and 3 yrs and older Afluria) 10/13/2016    Pneumococcal Conjugate 13-valent ([id number]) 05/18/2015    Pneumococcal Polysaccharide ([id number]) 10/01/2005    Td, unspecified formulation 07/20/2012    Tdap (Boostrix, Adacel) 10/13/2016, 04/19/2017    Zoster Live (Zostavax) 04/30/2012    Zoster Recombinant (Shingrix) 10/28/2019, 05/07/2020        Health Maintenance   Topic Date Due    Annual Wellness Visit (AWV)  07/20/2020    COVID-19 Vaccine (2 of 2 - Moderna series) 02/16/2021    Lipid screen  05/29/2021    Potassium monitoring  05/29/2021    Creatinine monitoring  05/29/2021    TSH testing  01/26/2022    DTaP/Tdap/Td vaccine (3 - Td) 04/19/2027    Flu vaccine  Completed    Shingles Vaccine  Completed    Pneumococcal 65+ years Vaccine  Completed    Hepatitis A vaccine  Aged Out    Hepatitis B vaccine  Aged Out    Hib vaccine  Aged Out    Meningococcal (ACWY) vaccine  Aged Out     Recommendations for [employer] Due: see orders and patient instructions/AVS.  . Recommended screening schedule for the next 5-10 years is provided to the patient in written form: see Patient Instructions/AVS.    Irma Petit was seen today for medicare awv, 6 month follow-up and fall. Diagnoses and all orders for this visit:    Essential hypertension  -     Lipid Panel; Future  -     Comprehensive Metabolic Panel;  Future    Hyperlipidemia, unspecified hyperlipidemia type -     Lipid Panel; Future  -     Comprehensive Metabolic Panel; Future    Hypothyroidism, unspecified type  -     T4, Free; Future  -     TSH without Reflex;  Future    Coronary artery disease involving native coronary artery of native heart without angina pectoris- (Dr. Milagros Carter)  -     Quinn Eckert MD, Cardiology, [facility]    Chronic obstructive pulmonary disease, unspecified COPD type [facility])    Testosterone deficiency (Dr. Trang Franks- [facility])   -     CBC Auto Differential; Future    Osteoarthritis of thoracolumbar spine, unspecified spinal osteoarthritis complication status    Osteopenia, unspecified location    Anemia, unspecified type    Gastroesophageal reflux disease, unspecified whether esophagitis present

## 2021-01-28 NOTE — PATIENT INSTRUCTIONS
Check with Dr. Joan Vo office re Metoprolol formulation (short-acting vs long acting) and dose  Home BP's- Call if > 140/90 on a regular basis. Encouraged increased water intake, drinking > 64 ounces daily. Please check with Dr. Goodwin  office re Thyroid dose- will fax recent lab results to his office. Follow up with Dr. Ramez Gomez (Hem/ Oncology at [place] for elevated \"Protein in blood\") Dr. Mora Aviles, Dr. Rito Santiago, Dr. Devin Gilmore, and Dr. Anival Goldstein as planned.   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 Continue current medicines.   No refills needed today per pt report. Pt would like referral to new cardiologist at [facility]- will refer to Dr. Galina Salomon, Dr. Anupama Reveles, or Dr. Natacha Mcintosh. Follow up in 6 months. Preventive Health Topics:  Encouraged COVID VACCINE #2- to be done 2/15/2021. COLONOSCOPY done 10/17/2014 per Dr. Devin Gilmore- no further mandated. (updated 1/28/2021)  Declines FIT testing at this time (updated 1/28/2021)  DEXA management per [place] clinic- 9/2020-  ?? Results- nothing needed at this time. On Alendronate. (updated 1/28/2021)  PSA remains on hold at this time due to age and fact that PSA has always been very low in past. (updated 1/28/2021)  [address] [facility]- all ok- to follow up in 6/2021. To see Dr. Adriana Trevino 2/1/2021.  (updated 1/28/2021)                      Personalized Preventive Plan for Jaqui Ruiz - 1/28/2021  Medicare offers a range of preventive health benefits. Some of the tests and screenings are paid in full while other may be subject to a deductible, co-insurance, and/or copay. Some of these benefits include a comprehensive review of your medical history including lifestyle, illnesses that may run in your family, and various assessments and screenings as appropriate. After reviewing your medical record and screening and assessments performed today your provider may have ordered immunizations, labs, imaging, and/or referrals for you. A list of these orders (if applicable) as well as your Preventive Care list are included within your After Visit Summary for your review. Other Preventive Recommendations:    · A preventive eye exam performed by an eye specialist is recommended every 1-2 years to screen for glaucoma; cataracts, macular degeneration, and other eye disorders. · A preventive dental visit is recommended every 6 months. · Try to get at least 150 minutes of exercise per week or 10,000 steps per day on a pedometer . · Order or download the FREE \"Exercise & Physical Activity: Your Everyday Guide\" from [employer]. Call 8-898.734.5845 or search [employer] online. · You need 8338-9606 mg of calcium and 3265-1085 IU of vitamin D per day. It is possible to meet your calcium requirement with diet alone, but a vitamin D supplement is usually necessary to meet this goal.  · When exposed to the sun, use a sunscreen that protects against both UVA and UVB radiation with an SPF of 30 or greater. Reapply every 2 to 3 hours or after sweating, drying off with a towel, or swimming. · Always wear a seat belt when traveling in a car. Always wear a helmet when riding a bicycle or motorcycle.

## 2021-03-01 ENCOUNTER — OFFICE VISIT (OUTPATIENT)
Dept: FAMILY MEDICINE CLINIC | Age: 86
End: 2021-03-01
Payer: MEDICARE

## 2021-03-01 VITALS
RESPIRATION RATE: 16 BRPM | HEART RATE: 68 BPM | BODY MASS INDEX: 31.73 KG/M2 | TEMPERATURE: 96.9 F | DIASTOLIC BLOOD PRESSURE: 86 MMHG | WEIGHT: 202.6 LBS | SYSTOLIC BLOOD PRESSURE: 144 MMHG

## 2021-03-01 DIAGNOSIS — M24.849 THUMB JOINT LOCKING: ICD-10-CM

## 2021-03-01 DIAGNOSIS — R06.02 SOB (SHORTNESS OF BREATH) ON EXERTION: ICD-10-CM

## 2021-03-01 DIAGNOSIS — Z91.81 AT HIGH RISK FOR FALLS: ICD-10-CM

## 2021-03-01 DIAGNOSIS — R42 DIZZINESS: Primary | ICD-10-CM

## 2021-03-01 PROCEDURE — 4040F PNEUMOC VAC/ADMIN/RCVD: CPT | Performed by: NURSE PRACTITIONER

## 2021-03-01 PROCEDURE — G8482 FLU IMMUNIZE ORDER/ADMIN: HCPCS | Performed by: NURSE PRACTITIONER

## 2021-03-01 PROCEDURE — 99213 OFFICE O/P EST LOW 20 MIN: CPT | Performed by: NURSE PRACTITIONER

## 2021-03-01 PROCEDURE — G8417 CALC BMI ABV UP PARAM F/U: HCPCS | Performed by: NURSE PRACTITIONER

## 2021-03-01 PROCEDURE — 1036F TOBACCO NON-USER: CPT | Performed by: NURSE PRACTITIONER

## 2021-03-01 PROCEDURE — 1123F ACP DISCUSS/DSCN MKR DOCD: CPT | Performed by: NURSE PRACTITIONER

## 2021-03-01 PROCEDURE — G8427 DOCREV CUR MEDS BY ELIG CLIN: HCPCS | Performed by: NURSE PRACTITIONER

## 2021-03-01 ASSESSMENT — ENCOUNTER SYMPTOMS
EYES NEGATIVE: 1
ABDOMINAL PAIN: 0
BLOOD IN STOOL: 0
SHORTNESS OF BREATH: 1
CHEST TIGHTNESS: 0

## 2021-03-01 NOTE — PROGRESS NOTES
Chief Complaint   Patient presents with    Dizziness     Patient has MRI scheduled later this month. It was recommended that he f/u in this office to check b/p prior to the study. B/p scanned and attached.  Finger Pain     Right thumb discomfort for the past week. Kimberly Banks is a 81 y.o.male      Pt is going to have an MRI brain at the end of the month. He was evaluated by neurology at [facility] and they recommended he come see his PCP to be checked for orthostatic hypotension or a cardiac nature behind his dizziness. Pt is having dizziness when laying down, looking up, and in the morning. Pt was going to switch cardiologists but decided to stay with his current cardiologist, Dr Payam Ryan. He is unsure if he has had a carotid ultrasound in the past. He has had echo but unsure how recent. Pt reports his right thumb is tender and is \"catching\". This has been going for on the last week. No known injury. Media Information               Document Information    Outside Record   FMA  BP July 2020 - Jan 2021 01/28/2021   Attached To: Office Visit on 1/28/21 with So Snell MD   Source Information    Mally Miranda LPN  [facility]       Review of Systems   Constitutional: Negative for chills, fatigue, fever and unexpected weight change. HENT: Negative. Eyes: Negative. Respiratory: Positive for shortness of breath (with exertion). Negative for chest tightness. Cardiovascular: Negative for chest pain, palpitations and leg swelling. Gastrointestinal: Negative for abdominal pain and blood in stool. Genitourinary: Negative for dysuria. Musculoskeletal: Negative for joint swelling. Skin: Negative for rash. Neurological: Positive for dizziness. Psychiatric/Behavioral: Negative. All other systems reviewed and are negative.         OBJECTIVE BP (!) 144/86 (Site: Left Upper Arm, Position: Standing)   Pulse 68   Temp 96.9 °F (36.1 °C) (Temporal)   Resp 16   Wt 202 lb 9.6 oz (91.9 kg)   BMI 31.73 kg/m²   BP Readings from Last 3 Encounters:   03/01/21 (!) 144/86   01/28/21 122/78   01/18/21 (!) 144/80     Physical Exam  Vitals signs and nursing note reviewed. Constitutional:       Appearance: He is well-developed. HENT:      Head: Normocephalic and atraumatic. Right Ear: External ear normal.      Left Ear: External ear normal.      Nose: Nose normal.   Eyes:      Conjunctiva/sclera: Conjunctivae normal.      Pupils: Pupils are equal, round, and reactive to light. Neck:      Musculoskeletal: Normal range of motion and neck supple. Cardiovascular:      Rate and Rhythm: Normal rate and regular rhythm. Frequent extrasystoles are present. Pulmonary:      Effort: Pulmonary effort is normal.      Breath sounds: Normal breath sounds. Abdominal:      General: Bowel sounds are normal.      Palpations: Abdomen is soft. Musculoskeletal: Normal range of motion. Skin:     General: Skin is warm and dry. Neurological:      Mental Status: He is alert and oriented to person, place, and time. Deep Tendon Reflexes: Reflexes are normal and symmetric. Psychiatric:         Behavior: Behavior normal.         Thought Content: Thought content normal.         Judgment: Judgment normal.           No results found for this visit on 03/01/21. ASSESSMENT       Diagnosis Orders   1. Dizziness  VL DUP CAROTID BILATERAL   2. SOB (shortness of breath) on exertion  VL DUP CAROTID BILATERAL   3. Thumb joint locking  Amb External Referral To Orthopedic Surgery   4.  At high risk for falls         PLAN       Orders Placed This Encounter   Procedures    VL DUP CAROTID BILATERAL     Standing Status:   Future     Standing Expiration Date:   3/1/2022     Order Specific Question:   Reason for exam:     Answer:   dizziness for at least 6 months  Amb External Referral To Orthopedic Surgery     Referral Priority:   Routine     Referral Type:   Consult for Advice and Opinion     Referred to Provider:   Reinaldo Araya MD     Requested Specialty:   Orthopedic Surgery     Number of Visits Requested:   1     Orthostatic BP's checked. No drop in BP noted. Carotid ultrasound ordered  Pt recommended to follow up with cardiology  Referral to ortho placed  Follow up as previously scheduled        Electronically signed by IGNACIO Diaz CNP on 3/1/2021 at 4:34 PM  On the basis of positive falls risk screening, assessment and plan is as follows: home safety tips provided.

## 2021-03-01 NOTE — PATIENT INSTRUCTIONS
Patient Education        Dizziness: Care Instructions  Your Care Instructions  Dizziness is the feeling of unsteadiness or fuzziness in your head. It is different than having vertigo, which is a feeling that the room is spinning or that you are moving or falling. It is also different from lightheadedness, which is the feeling that you are about to faint. It can be hard to know what causes dizziness. Some people feel dizzy when they have migraine headaches. Sometimes bouts of flu can make you feel dizzy. Some medical conditions, such as heart problems or high blood pressure, can make you feel dizzy. Many medicines can cause dizziness, including medicines for high blood pressure, pain, or anxiety. If a medicine causes your symptoms, your doctor may recommend that you stop or change the medicine. If it is a problem with your heart, you may need medicine to help your heart work better. If there is no clear reason for your symptoms, your doctor may suggest watching and waiting for a while to see if the dizziness goes away on its own. Follow-up care is a key part of your treatment and safety. Be sure to make and go to all appointments, and call your doctor if you are having problems. It's also a good idea to know your test results and keep a list of the medicines you take. How can you care for yourself at home? · If your doctor recommends or prescribes medicine, take it exactly as directed. Call your doctor if you think you are having a problem with your medicine. · Do not drive while you feel dizzy. · Try to prevent falls. Steps you can take include:  ? Using nonskid mats, adding grab bars near the tub, and using night-lights. ? Clearing your home so that walkways are free of anything you might trip on.  ? Letting family and friends know that you have been feeling dizzy. This will help them know how to help you. When should you call for help? Call 911 anytime you think you may need emergency care. For example, call if:    · You passed out (lost consciousness).     · You have dizziness along with symptoms of a heart attack. These may include:  ? Chest pain or pressure, or a strange feeling in the chest.  ? Sweating. ? Shortness of breath. ? Nausea or vomiting. ? Pain, pressure, or a strange feeling in the back, neck, jaw, or upper belly or in one or both shoulders or arms. ? Lightheadedness or sudden weakness. ? A fast or irregular heartbeat.     · You have symptoms of a stroke. These may include:  ? Sudden numbness, tingling, weakness, or loss of movement in your face, arm, or leg, especially on only one side of your body. ? Sudden vision changes. ? Sudden trouble speaking. ? Sudden confusion or trouble understanding simple statements. ? Sudden problems with walking or balance. ? A sudden, severe headache that is different from past headaches. Call your doctor now or seek immediate medical care if:    · You feel dizzy and have a fever, headache, or ringing in your ears.     · You have new or increased nausea and vomiting.     · Your dizziness does not go away or comes back. Watch closely for changes in your health, and be sure to contact your doctor if:    · You do not get better as expected. Where can you learn more? Go to [email or web address] and sign in to your [employer] account. Enter U881 in the [facility] box to learn more about \"Dizziness: Care Instructions. \"     If you do not have an account, please click on the \"Sign Up Now\" link. Current as of: June 26, 2019               Content Version: 12.6  © 2897-1539 [employer]. Care instructions adapted under license by [facility] [facility]). If you have questions about a medical condition or this instruction, always ask your healthcare professional. [address] any warranty or liability for your use of this information.

## 2021-03-09 ENCOUNTER — HOSPITAL ENCOUNTER (OUTPATIENT)
Dept: INTERVENTIONAL RADIOLOGY/VASCULAR | Age: 86
Discharge: HOME | End: 2021-03-09
Payer: MEDICARE

## 2021-03-09 DIAGNOSIS — R06.02 SOB (SHORTNESS OF BREATH) ON EXERTION: ICD-10-CM

## 2021-03-09 DIAGNOSIS — R42 DIZZINESS: ICD-10-CM

## 2021-03-09 PROCEDURE — 93880 EXTRACRANIAL BILAT STUDY: CPT

## 2021-03-10 ENCOUNTER — TELEPHONE (OUTPATIENT)
Dept: FAMILY MEDICINE CLINIC | Age: 86
End: 2021-03-10

## 2021-03-10 NOTE — TELEPHONE ENCOUNTER
----- Message from IGNACIO Morrow CNP sent at 3/9/2021  2:54 PM EST -----  Carotid doppler shows less than 50% stenosis bilat. Please make sure his cardiologist and neurologist get a copy.  TS

## 2021-03-10 NOTE — TELEPHONE ENCOUNTER
Pt notified of the test result. Copy of report faxed to Dr. Sultana Medina at [address] [place] [address] ([phone]) and Dr. Shahid Alva at [phone].

## 2021-04-23 ENCOUNTER — NURSE ONLY (OUTPATIENT)
Dept: LAB | Age: 86
End: 2021-04-23

## 2021-04-23 DIAGNOSIS — E34.9 TESTOSTERONE DEFICIENCY: ICD-10-CM

## 2021-04-23 DIAGNOSIS — I10 ESSENTIAL HYPERTENSION: ICD-10-CM

## 2021-04-23 DIAGNOSIS — E78.5 HYPERLIPIDEMIA, UNSPECIFIED HYPERLIPIDEMIA TYPE: ICD-10-CM

## 2021-04-23 DIAGNOSIS — E03.9 HYPOTHYROIDISM, UNSPECIFIED TYPE: ICD-10-CM

## 2021-04-23 LAB
ALBUMIN SERPL-MCNC: 4 G/DL (ref 3.5–5.1)
ALP BLD-CCNC: 164 U/L (ref 38–126)
ALT SERPL-CCNC: 59 U/L (ref 11–66)
ANION GAP SERPL CALCULATED.3IONS-SCNC: 10 MEQ/L (ref 8–16)
AST SERPL-CCNC: 53 U/L (ref 5–40)
BASOPHILS # BLD: 1.2 %
BASOPHILS ABSOLUTE: 0.1 THOU/MM3 (ref 0–0.1)
BILIRUB SERPL-MCNC: 0.9 MG/DL (ref 0.3–1.2)
BUN BLDV-MCNC: 17 MG/DL (ref 7–22)
CALCIUM SERPL-MCNC: 9.7 MG/DL (ref 8.5–10.5)
CHLORIDE BLD-SCNC: 102 MEQ/L (ref 98–111)
CHOLESTEROL, TOTAL: 127 MG/DL (ref 100–199)
CO2: 28 MEQ/L (ref 23–33)
CREAT SERPL-MCNC: 0.9 MG/DL (ref 0.4–1.2)
EOSINOPHIL # BLD: 3.5 %
EOSINOPHILS ABSOLUTE: 0.2 THOU/MM3 (ref 0–0.4)
ERYTHROCYTE [DISTWIDTH] IN BLOOD BY AUTOMATED COUNT: 13.2 % (ref 11.5–14.5)
ERYTHROCYTE [DISTWIDTH] IN BLOOD BY AUTOMATED COUNT: 46.7 FL (ref 35–45)
GFR SERPL CREATININE-BSD FRML MDRD: 80 ML/MIN/1.73M2
GLUCOSE BLD-MCNC: 83 MG/DL (ref 70–108)
HCT VFR BLD CALC: 41.7 % (ref 42–52)
HDLC SERPL-MCNC: 32 MG/DL
HEMOGLOBIN: 13 GM/DL (ref 14–18)
IMMATURE GRANS (ABS): 0.02 THOU/MM3 (ref 0–0.07)
IMMATURE GRANULOCYTES: 0.5 %
LDL CHOLESTEROL CALCULATED: 72 MG/DL
LYMPHOCYTES # BLD: 37 %
LYMPHOCYTES ABSOLUTE: 1.6 THOU/MM3 (ref 1–4.8)
MAGNESIUM: 2.1 MG/DL (ref 1.6–2.4)
MCH RBC QN AUTO: 30.2 PG (ref 26–33)
MCHC RBC AUTO-ENTMCNC: 31.2 GM/DL (ref 32.2–35.5)
MCV RBC AUTO: 97 FL (ref 80–94)
MONOCYTES # BLD: 14.4 %
MONOCYTES ABSOLUTE: 0.6 THOU/MM3 (ref 0.4–1.3)
NUCLEATED RED BLOOD CELLS: 0 /100 WBC
PLATELET # BLD: 152 THOU/MM3 (ref 130–400)
PMV BLD AUTO: 11.3 FL (ref 9.4–12.4)
POTASSIUM SERPL-SCNC: 4.8 MEQ/L (ref 3.5–5.2)
RBC # BLD: 4.3 MILL/MM3 (ref 4.7–6.1)
SEG NEUTROPHILS: 43.4 %
SEGMENTED NEUTROPHILS ABSOLUTE COUNT: 1.9 THOU/MM3 (ref 1.8–7.7)
SODIUM BLD-SCNC: 140 MEQ/L (ref 135–145)
T4 FREE: 1.6 NG/DL (ref 0.93–1.76)
TOTAL PROTEIN: 7.1 G/DL (ref 6.1–8)
TRIGL SERPL-MCNC: 116 MG/DL (ref 0–199)
TSH SERPL DL<=0.05 MIU/L-ACNC: 0.03 UIU/ML (ref 0.4–4.2)
WBC # BLD: 4.3 THOU/MM3 (ref 4.8–10.8)

## 2021-04-27 ENCOUNTER — TELEPHONE (OUTPATIENT)
Dept: FAMILY MEDICINE CLINIC | Age: 86
End: 2021-04-27

## 2021-04-27 DIAGNOSIS — R74.8 ELEVATED ALKALINE PHOSPHATASE LEVEL: Primary | ICD-10-CM

## 2021-04-27 NOTE — TELEPHONE ENCOUNTER
----- Message from Sudeep Hutson MD sent at 4/23/2021  5:49 PM EDT -----  Notify pt-   Results reviewed. TSH decreased at 0.027/free T4 okay at 1.60please confirm Dr. Dedra Zhao is still managing patient's thyroid function and fax labs to him, if so. FLP okay, except HDL slightly low and LDL slightly highcontinue max dose Lipitor at this time. CMP okay, except elevated AST at 53 and elevated alkaline phosphatase at 164please have patient avoid all alcohol and Tylenol usage at this time. Magnesium normal  CBC okay, except mild leukopenia and macrocytic anemiahave patient start B complex multivitamin. Check fractionated alkaline phosphatase (also known as alkaline phosphatase isoenzymes) at this time  Recheck CBC and HFP in 6 weeks.   ES

## 2021-04-27 NOTE — TELEPHONE ENCOUNTER
Patient notified. He is following for his thyroid at the [facility]. Attempted to call Endo at [place] and  for Rosalie Garcia to call us with fax number to send results. [phone] Ext. [phone], ayana ext. [phone]. Labs faxed.  [facility] fax #: [phone]

## 2021-06-18 ENCOUNTER — NURSE ONLY (OUTPATIENT)
Dept: LAB | Age: 86
End: 2021-06-18

## 2021-06-18 DIAGNOSIS — R74.8 ELEVATED ALKALINE PHOSPHATASE LEVEL: ICD-10-CM

## 2021-06-18 LAB
ANION GAP SERPL CALCULATED.3IONS-SCNC: 9 MEQ/L (ref 8–16)
BUN BLDV-MCNC: 14 MG/DL (ref 7–22)
CALCIUM SERPL-MCNC: 9.5 MG/DL (ref 8.5–10.5)
CHLORIDE BLD-SCNC: 106 MEQ/L (ref 98–111)
CO2: 27 MEQ/L (ref 23–33)
CREAT SERPL-MCNC: 0.9 MG/DL (ref 0.4–1.2)
ERYTHROCYTE [DISTWIDTH] IN BLOOD BY AUTOMATED COUNT: 12.9 % (ref 11.5–14.5)
ERYTHROCYTE [DISTWIDTH] IN BLOOD BY AUTOMATED COUNT: 45.6 FL (ref 35–45)
GFR SERPL CREATININE-BSD FRML MDRD: 80 ML/MIN/1.73M2
GLUCOSE BLD-MCNC: 86 MG/DL (ref 70–108)
HCT VFR BLD CALC: 42 % (ref 42–52)
HEMOGLOBIN: 13.1 GM/DL (ref 14–18)
MCH RBC QN AUTO: 30 PG (ref 26–33)
MCHC RBC AUTO-ENTMCNC: 31.2 GM/DL (ref 32.2–35.5)
MCV RBC AUTO: 96.3 FL (ref 80–94)
PLATELET # BLD: 152 THOU/MM3 (ref 130–400)
PMV BLD AUTO: 11 FL (ref 9.4–12.4)
POTASSIUM SERPL-SCNC: 4.5 MEQ/L (ref 3.5–5.2)
RBC # BLD: 4.36 MILL/MM3 (ref 4.7–6.1)
SODIUM BLD-SCNC: 142 MEQ/L (ref 135–145)
WBC # BLD: 4.6 THOU/MM3 (ref 4.8–10.8)

## 2021-06-21 ENCOUNTER — TELEPHONE (OUTPATIENT)
Dept: FAMILY MEDICINE CLINIC | Age: 86
End: 2021-06-21

## 2021-06-22 ENCOUNTER — TELEPHONE (OUTPATIENT)
Dept: FAMILY MEDICINE CLINIC | Age: 86
End: 2021-06-22

## 2021-06-22 DIAGNOSIS — D64.9 ANEMIA, UNSPECIFIED TYPE: Primary | ICD-10-CM

## 2021-06-22 DIAGNOSIS — D72.819 LEUKOPENIA, UNSPECIFIED TYPE: ICD-10-CM

## 2021-06-22 LAB — ALKALINE PHOSPHATASE ISOENZYMES: NORMAL

## 2021-06-22 NOTE — TELEPHONE ENCOUNTER
----- Message from Anisa Zarco MD sent at 6/22/2021  7:06 AM EDT -----  Notify pt-   Results reviewed.    Alkaline phosphatase isoenzymes all normalBMP normalCBC okay, except mild leukopenia and mild anemia (essentially stable)Recheck CBC 1 week prior to appointment with me in August.  ES

## 2021-06-23 ENCOUNTER — ANESTHESIA EVENT (OUTPATIENT)
Dept: OPERATING ROOM | Age: 86
End: 2021-06-23
Payer: MEDICARE

## 2021-06-23 NOTE — PROGRESS NOTES
NPO after midnight except for sip of water with heart/BP meds  Follow instructions given by surgeon including medications to hold   Bring insurance card and photo ID  Shower morning of surgery with liquid antibacterial soap  Wear loose comfortable clothing  Remove jewelry and do not bring valuables  Bring list of medications with dosages and how often taken if not reviewed with PAT    needed at discharge at [facility] 26years old  Call PAT at [phone] for questions

## 2021-06-23 NOTE — PROGRESS NOTES
EKG clearance form given to Dr. Ivania Rinaldi  for review. OK to proceed with surgery as planned at surgery center.

## 2021-06-30 ENCOUNTER — HOSPITAL ENCOUNTER (OUTPATIENT)
Age: 86
Setting detail: OUTPATIENT SURGERY
Discharge: HOME | End: 2021-06-30
Attending: SPECIALIST | Admitting: SPECIALIST
Payer: MEDICARE

## 2021-06-30 ENCOUNTER — ANESTHESIA (OUTPATIENT)
Dept: OPERATING ROOM | Age: 86
End: 2021-06-30
Payer: MEDICARE

## 2021-06-30 VITALS
HEIGHT: 67 IN | WEIGHT: 187.6 LBS | BODY MASS INDEX: 29.44 KG/M2 | RESPIRATION RATE: 16 BRPM | HEART RATE: 72 BPM | TEMPERATURE: 96.9 F | OXYGEN SATURATION: 96 % | SYSTOLIC BLOOD PRESSURE: 120 MMHG | DIASTOLIC BLOOD PRESSURE: 75 MMHG

## 2021-06-30 VITALS
DIASTOLIC BLOOD PRESSURE: 56 MMHG | OXYGEN SATURATION: 100 % | TEMPERATURE: 97 F | SYSTOLIC BLOOD PRESSURE: 103 MMHG | RESPIRATION RATE: 14 BRPM

## 2021-06-30 PROCEDURE — 2500000003 HC RX 250 WO HCPCS: Performed by: SPECIALIST

## 2021-06-30 PROCEDURE — 3600000012 HC SURGERY LEVEL 2 ADDTL 15MIN: Performed by: SPECIALIST

## 2021-06-30 PROCEDURE — 6360000002 HC RX W HCPCS: Performed by: SPECIALIST

## 2021-06-30 PROCEDURE — 2709999900 HC NON-CHARGEABLE SUPPLY: Performed by: SPECIALIST

## 2021-06-30 PROCEDURE — 3700000001 HC ADD 15 MINUTES (ANESTHESIA): Performed by: SPECIALIST

## 2021-06-30 PROCEDURE — 6360000002 HC RX W HCPCS: Performed by: NURSE ANESTHETIST, CERTIFIED REGISTERED

## 2021-06-30 PROCEDURE — 2500000003 HC RX 250 WO HCPCS: Performed by: NURSE ANESTHETIST, CERTIFIED REGISTERED

## 2021-06-30 PROCEDURE — 7100000010 HC PHASE II RECOVERY - FIRST 15 MIN: Performed by: SPECIALIST

## 2021-06-30 PROCEDURE — 3600000002 HC SURGERY LEVEL 2 BASE: Performed by: SPECIALIST

## 2021-06-30 PROCEDURE — 3700000000 HC ANESTHESIA ATTENDED CARE: Performed by: SPECIALIST

## 2021-06-30 PROCEDURE — 2580000003 HC RX 258: Performed by: SPECIALIST

## 2021-06-30 PROCEDURE — 7100000011 HC PHASE II RECOVERY - ADDTL 15 MIN: Performed by: SPECIALIST

## 2021-06-30 RX ORDER — FENTANYL CITRATE 50 UG/ML
INJECTION, SOLUTION INTRAMUSCULAR; INTRAVENOUS PRN
Status: DISCONTINUED | OUTPATIENT
Start: 2021-06-30 | End: 2021-06-30 | Stop reason: SDUPTHER

## 2021-06-30 RX ORDER — SODIUM CHLORIDE 9 MG/ML
INJECTION, SOLUTION INTRAVENOUS CONTINUOUS
Status: DISCONTINUED | OUTPATIENT
Start: 2021-06-30 | End: 2021-06-30 | Stop reason: HOSPADM

## 2021-06-30 RX ORDER — PROPOFOL 10 MG/ML
INJECTION, EMULSION INTRAVENOUS PRN
Status: DISCONTINUED | OUTPATIENT
Start: 2021-06-30 | End: 2021-06-30 | Stop reason: SDUPTHER

## 2021-06-30 RX ORDER — LIDOCAINE HYDROCHLORIDE 20 MG/ML
INJECTION, SOLUTION EPIDURAL; INFILTRATION; INTRACAUDAL; PERINEURAL PRN
Status: DISCONTINUED | OUTPATIENT
Start: 2021-06-30 | End: 2021-06-30 | Stop reason: SDUPTHER

## 2021-06-30 RX ORDER — LIDOCAINE HYDROCHLORIDE AND EPINEPHRINE 10; 10 MG/ML; UG/ML
INJECTION, SOLUTION INFILTRATION; PERINEURAL PRN
Status: DISCONTINUED | OUTPATIENT
Start: 2021-06-30 | End: 2021-06-30 | Stop reason: ALTCHOICE

## 2021-06-30 RX ORDER — ONDANSETRON 2 MG/ML
INJECTION INTRAMUSCULAR; INTRAVENOUS PRN
Status: DISCONTINUED | OUTPATIENT
Start: 2021-06-30 | End: 2021-06-30 | Stop reason: SDUPTHER

## 2021-06-30 RX ADMIN — SODIUM CHLORIDE: 9 INJECTION, SOLUTION INTRAVENOUS at 13:28

## 2021-06-30 RX ADMIN — LIDOCAINE HYDROCHLORIDE 40 MG: 20 INJECTION, SOLUTION EPIDURAL; INFILTRATION; INTRACAUDAL; PERINEURAL at 13:31

## 2021-06-30 RX ADMIN — PROPOFOL 20 MG: 10 INJECTION, EMULSION INTRAVENOUS at 14:04

## 2021-06-30 RX ADMIN — PROPOFOL 20 MG: 10 INJECTION, EMULSION INTRAVENOUS at 13:42

## 2021-06-30 RX ADMIN — PROPOFOL 20 MG: 10 INJECTION, EMULSION INTRAVENOUS at 14:21

## 2021-06-30 RX ADMIN — PROPOFOL 20 MG: 10 INJECTION, EMULSION INTRAVENOUS at 13:50

## 2021-06-30 RX ADMIN — PROPOFOL 30 MG: 10 INJECTION, EMULSION INTRAVENOUS at 13:32

## 2021-06-30 RX ADMIN — PROPOFOL 20 MG: 10 INJECTION, EMULSION INTRAVENOUS at 14:16

## 2021-06-30 RX ADMIN — PROPOFOL 20 MG: 10 INJECTION, EMULSION INTRAVENOUS at 13:57

## 2021-06-30 RX ADMIN — PROPOFOL 20 MG: 10 INJECTION, EMULSION INTRAVENOUS at 14:07

## 2021-06-30 RX ADMIN — PROPOFOL 20 MG: 10 INJECTION, EMULSION INTRAVENOUS at 14:13

## 2021-06-30 RX ADMIN — PROPOFOL 20 MG: 10 INJECTION, EMULSION INTRAVENOUS at 14:27

## 2021-06-30 RX ADMIN — PROPOFOL 30 MG: 10 INJECTION, EMULSION INTRAVENOUS at 13:46

## 2021-06-30 RX ADMIN — PROPOFOL 20 MG: 10 INJECTION, EMULSION INTRAVENOUS at 14:00

## 2021-06-30 RX ADMIN — FENTANYL CITRATE 50 MCG: 50 INJECTION, SOLUTION INTRAMUSCULAR; INTRAVENOUS at 13:31

## 2021-06-30 RX ADMIN — PROPOFOL 20 MG: 10 INJECTION, EMULSION INTRAVENOUS at 14:10

## 2021-06-30 RX ADMIN — CEFAZOLIN SODIUM 2000 MG: 10 INJECTION, POWDER, FOR SOLUTION INTRAVENOUS at 13:32

## 2021-06-30 RX ADMIN — ONDANSETRON HYDROCHLORIDE 4 MG: 4 INJECTION, SOLUTION INTRAMUSCULAR; INTRAVENOUS at 13:36

## 2021-06-30 ASSESSMENT — PULMONARY FUNCTION TESTS
PIF_VALUE: 0

## 2021-06-30 ASSESSMENT — PAIN SCALES - GENERAL: PAINLEVEL_OUTOF10: 0

## 2021-06-30 ASSESSMENT — PAIN - FUNCTIONAL ASSESSMENT: PAIN_FUNCTIONAL_ASSESSMENT: 0-10

## 2021-06-30 NOTE — H&P
[address] [address]  History and Physical Update    Pt Name: Ze Meng  MRN: [id number]  YOB: 1935  Date of evaluation: 6/30/2021    I have examined the patient and reviewed the H&P/Consult and there are no changes to the patient or plans.       Misael Mclaughlin MD  Electronically signed 6/30/2021 at 6:56 AM

## 2021-06-30 NOTE — PROGRESS NOTES
1439-Patient to Phase II via chair. Report received from [facility] AND [facility]. Patient awake and alert. Vitals obtained and stable. Respirations even and unlabored. Patient denies pain and nausea. Dressing to left lower leg intact with ace wrap. Dressing to left thigh intact with and pad. No drainage noted to either site. Wife to bedside. Instructed on call light use. 1435-Patient provided with snack and drink. Denies needs. 1500-Patient denies needs. IV removed Patient wants to rest.  1515-Patient assisted by RN with getting dressed. Tolerated well. 1518-Discharge instructions reviewed. Verbalized understanding. Extensive dressing change teaching completed. 1530-Patient discharged in stable condition. Patient brought to car via wheelchair with assistance from RN. Patient tolerated well. All belongings sent with patient.

## 2021-06-30 NOTE — ANESTHESIA PRE PROCEDURE
Department of Anesthesiology  Preprocedure Note       Name:  Kehinde Alan   Age:  81 y.o.  :  1935                                          MRN:  [id number]         Date:  2021      Surgeon: Juvencio Nguyen):  Basia Gerard MD    Procedure: Procedure(s):  MOHS DEFECT REPAIR BCC AND SCC LEFT DORSAL LOWER LEG, POSSIBLE SPLIT THICKNESS SKIN GRAFT    Medications prior to admission:   Prior to Admission medications    Medication Sig Start Date End Date Taking? Authorizing Provider   metoprolol succinate (TOPROL XL) 25 MG extended release tablet Take 25 mg by mouth daily   Yes Historical Provider, MD   potassium chloride (KLOR-CON) 10 MEQ extended release tablet TAKE 1 TABLET BY MOUTH ONCE DAILY WITH FOOD 20  Yes Historical Provider, MD   naproxen (NAPROSYN) 500 MG tablet Take 1 tablet by mouth daily 20  Yes Shreya Colón MD   Cholecalciferol (VITAMIN D3) 2000 units CAPS Take 1 capsule by mouth daily   Yes Historical Provider, MD   vitamin B-12 (CYANOCOBALAMIN) 1000 MCG tablet Take 1,000 mcg by mouth every other day   Yes Historical Provider, MD   atorvastatin (LIPITOR) 80 MG tablet Take 80 mg by mouth daily   Yes Historical Provider, MD   Testosterone (ANDRODERM) 4 MG/24HR PT24 Place 2 mg onto the skin daily. Yes Historical Provider, MD   levothyroxine (SYNTHROID) 75 MCG tablet Take 75 mcg by mouth Daily   Yes Historical Provider, MD   alendronate (FOSAMAX) 70 MG tablet Take 70 mg by mouth every 7 days Take every Wed. Yes Historical Provider, MD   losartan (COZAAR) 50 MG tablet Take 1 tablet by mouth daily 16  Yes Shreya Colón MD   isosorbide mononitrate (IMDUR) 60 MG CR tablet Take 60 mg by mouth daily.    Yes Historical Provider, MD   aspirin 81 MG EC tablet   Take 81 mg by mouth daily Last dose 2015 for surgery 5-   Yes Historical Provider, MD   vitamin E 100 UNIT capsule   Take 400 Units by mouth 2 times daily Last dose 2015 for surgery 5-   Yes Historical Provider, MD   furosemide (LASIX) 20 MG tablet Take 20 mg by mouth daily    Historical Provider, MD   ipratropium-albuterol (DUONEB) 0.5-2.5 (3) MG/3ML SOLN nebulizer solution Inhale 1 vial into the lungs every 4 hours as needed     Historical Provider, MD   budesonide-formoterol (SYMBICORT) 160-4.5 MCG/ACT AERO Inhale 2 puffs into the lungs 2 times daily  Patient not taking: Reported on 7/20/2020 6/15/18   Jarrell Kayser, MD   sodium chloride (OCEAN) 0.65 % nasal spray 1 spray by Nasal route 2 times daily as needed for Congestion    Historical Provider, MD   albuterol sulfate HFA (PROAIR HFA) 108 (90 Base) MCG/ACT inhaler Inhale 2 puffs into the lungs every 6 hours as needed for Wheezing    Historical Provider, MD       Current medications:    Current Facility-Administered Medications   Medication Dose Route Frequency Provider Last Rate Last Admin    0.9 % sodium chloride infusion   Intravenous Continuous Low Hawley MD   New Bag at 06/30/21 1328     Facility-Administered Medications Ordered in Other Encounters   Medication Dose Route Frequency Provider Last Rate Last Admin    propofol injection   Intravenous PRN Sandra Passer, APRN - CRNA   20 mg at 06/30/21 1350    lidocaine PF 2 % injection   Intravenous PRN Sandra Passer, APRN - CRNA   40 mg at 06/30/21 1331    fentaNYL (SUBLIMAZE) injection   Intravenous PRN Sandra Passer, APRN - CRNA   50 mcg at 06/30/21 1331    ondansetron (ZOFRAN) injection   Intravenous PRN Sandra Passer, APRN - CRNA   4 mg at 06/30/21 1336       Allergies:     Allergies   Allergen Reactions    Lisinopril Other (See Comments)     Cough      Iv Dye [Iodides] Rash       Problem List:    Patient Active Problem List   Diagnosis Code    Essential hypertension I10    Irregular heart beat I49.9    Hypothyroidism (Dr. Karyle Eddy) E03.9    Hyperlipidemia E78.5    Testosterone deficiency (Dr. Karyle Eddy- [facility])  E34.9    Sleep apnea G47.30    Coronary artery disease involving native coronary artery of native heart without angina pectoris- (Dr. Reddy Cornea) I25.10    Gastroesophageal reflux disease K21.9    Osteoarthritis of thoracolumbar spine M47.815    COPD (chronic obstructive pulmonary disease) ([facility]) J44.9    Osteopenia M85.80       Past Medical History:        Diagnosis Date    BCC (basal cell carcinoma), face     Nasal- Dr. Millie Baldwin, [address] CAD (coronary artery disease)     COPD (chronic obstructive pulmonary disease) ([facility] [address] 2017    dx'd in [place] GERD (gastroesophageal reflux disease)     GERD    Hyperlipidemia     Hypertension     Hypothyroidism     Irregular heart beat     LISA (nonalcoholic steatohepatitis)     OA (osteoarthritis)     Sleep apnea     uses cpap    Testosterone deficiency        Past Surgical History:        Procedure Laterality Date    CARDIAC CATHETERIZATION  10/04/2010    CARDIAC CATHETERIZATION      stent x1    COLONOSCOPY      last one     EYE SURGERY      bilateral cataracts    JOINT REPLACEMENT      right knee    JOINT REPLACEMENT      left knee    JOINT REPLACEMENT   - aug.    right shoulder    MOHS SURGERY      Dr. Neyda Mederos  2015    Right side of scalp     MOHS SURGERY N/A 2018    MOHS REPAIR BCC DORSAL NOSE performed by Amaris De León MD at [address] [place] [address]      TUMOR REMOVED    UT SONO GUIDE NEEDLE BIOPSY  2016    Benign, done in [facility] Right [address]    Dr. Scales Dys - total replacement   Shital Boselena Hancock and Dr. Suzanna Rivera Me      as a child   [facility]??        Social History:    Social History     Tobacco Use    Smoking status: Former Smoker     Packs/day: 3.00     Years: 20.00     Pack years: 60.00     Quit date: 10/9/1980     Years since quittin.7    Smokeless tobacco: Never Used Substance Use Topics    Alcohol use: Yes     Alcohol/week: 0.0 standard drinks     Comment: social                                Counseling given: Not Answered      Vital Signs (Current):   Vitals:    06/23/21 1417 06/30/21 1155   BP:  (!) 169/75   Pulse:  71   Resp:  16   Temp:  97.4 °F (36.3 °C)   TempSrc:  Temporal   SpO2:  95%   Weight: 195 lb (88.5 kg) 187 lb 9.6 oz (85.1 kg)   Height: 5' 7\" (1.702 m) 5' 7\" (1.702 m)                                              BP Readings from Last 3 Encounters:   06/30/21 (!) 169/75   03/01/21 (!) 144/86   01/28/21 122/78       NPO Status: Time of last liquid consumption: 0900 (sip of water with meds)                        Time of last solid consumption: 1800                        Date of last liquid consumption: 06/30/21                        Date of last solid food consumption: 06/29/21    BMI:   Wt Readings from Last 3 Encounters:   06/30/21 187 lb 9.6 oz (85.1 kg)   03/01/21 202 lb 9.6 oz (91.9 kg)   01/28/21 198 lb 3.2 oz (89.9 kg)     Body mass index is 29.38 kg/m². CBC:   Lab Results   Component Value Date    WBC 4.6 06/18/2021    RBC 4.36 06/18/2021    HGB 13.1 06/18/2021    HCT 42.0 06/18/2021    MCV 96.3 06/18/2021    RDW 12.7 06/03/2019     06/18/2021       CMP:   Lab Results   Component Value Date     06/18/2021    K 4.5 06/18/2021     06/18/2021    CO2 27 06/18/2021    BUN 14 06/18/2021    CREATININE 0.9 06/18/2021    AGRATIO 1.7 05/29/2020    LABGLOM 80 06/18/2021    GLUCOSE 86 06/18/2021    GLUCOSE 84 05/29/2020    PROT 7.1 04/23/2021    CALCIUM 9.5 06/18/2021    BILITOT 0.9 04/23/2021    ALKPHOS 164 04/23/2021    AST 53 04/23/2021    ALT 59 04/23/2021       POC Tests: No results for input(s): POCGLU, POCNA, POCK, POCCL, POCBUN, POCHEMO, POCHCT in the last 72 hours.     Coags:   Lab Results   Component Value Date    PROTIME 12.4 10/31/2016    INR 1.08 10/31/2016       HCG (If Applicable): No results found for: Anthony Crouch, HCG, HCGQUANT     ABGs: No results found for: PHART, PO2ART, IIK7YST, KYA7GWA, BEART, J0IFBRRT     Type & Screen (If Applicable):  No results found for: LABABO, LABRH    Drug/Infectious Status (If Applicable):  No results found for: HIV, HEPCAB    COVID-19 Screening (If Applicable): No results found for: COVID19        Anesthesia Evaluation  Patient summary reviewed and Nursing notes reviewed no history of anesthetic complications:   Airway: Mallampati: III  TM distance: >3 FB   Neck ROM: limited  Mouth opening: > = 3 FB Dental:          Pulmonary:normal exam  breath sounds clear to auscultation  (+) COPD:  sleep apnea:                             Cardiovascular:  Exercise tolerance: poor (<4 METS),   (+) hypertension:, CAD: obstructive, dysrhythmias:,       ECG reviewed                        Neuro/Psych:   Negative Neuro/Psych ROS              GI/Hepatic/Renal:   (+) GERD:, liver disease:,           Endo/Other:    (+) hypothyroidism::., .          Pt had no PAT visit       Abdominal:             Vascular: negative vascular ROS. Other Findings:             Anesthesia Plan      MAC     ASA 3       Induction: intravenous. Anesthetic plan and risks discussed with patient. Plan discussed with CRNA.                   [address], DO   6/30/2021

## 2021-06-30 NOTE — OP NOTE
Operative Note    Patient name: Victor Goodell             Medical Record Number: [id number]    Primary Care Physician: Dameon Nettles MD     1935    Date of Procedure: 2021    Pre-operative Diagnosis: 20cm2 defect of left lower leg s/p MOHS for neighboring basal cell and squamous cell carcinomas    Post-operative Diagnosis: Same    Procedure Performed: Split thickness skin graft (20 cm2) repair of left lower leg defects (CPT 46032)    Surgeons/Assistants: MD Emperatriz Encinas PA-C    Estimated Blood Loss: 5ml     Complications: none immediately appreciated    Procedure: With the patient lying in the supine position and under adequate anesthesia per the anesthesia team.  Local anesthesia consisting of 29 ml of 1% Lidocaine 1:100,000 with epinephrine solution of the donor site of the left lateral thigh as well as the two neighboring left leg defects. The area was prepped and draped in the standard surgical fashion. The patient has very thin skin and a large defect which could not be closed primarily, therefore a split thickness skin graft was taken at 0.016\" with dermatome. It was meshed 1/1.5, then secured in position with a 3-0 silk suture tie and skin staples. A Bacitracin Xeroform and moist cotton ball tie over bolster was secured using the tails of the silk sutures. The donor site was covered with xeroform, bacitracin and bulky sterile dressings. The patient tolerated the procedure well and remained hemodynamically stable throughout the procedure and was quite comfortable throughout the operative course.       Clinical staging for cancer cases:  Brenda Ferrari MD  Electronically signed by me on 2021 at 2:19 PM  Operative Note      Patient: Victor Goodell  YOB: 1935  MRN: [id number]    Date of Procedure: 2021    Pre-Op Diagnosis: SCC LEFT DORSAL LATERAL LOWER LEG \"A\" AND BCC LEFT DORSAL LATERAL LEG \"B\"    Post-Op Diagnosis: Same       Procedure(s):  MOHS DEFECT REPAIR BCC AND SCC LEFT DORSAL LOWER LEG,  SPLIT THICKNESS SKIN GRAFT  from left thigh    Surgeon(s):  Joseph Louis MD    Assistant:   Physician Assistant: Melina Kyle PA-C    Anesthesia: Monitor Anesthesia Care    Estimated Blood Loss (mL): Minimal    Complications: None    Specimens:   * No specimens in log *    Implants:  * No implants in log *      Drains: * No LDAs found *    Findings: 20cm2 defect of left lower leg s/p MOHS for neighboring basal cell and squamous cell carcinomas      Detailed Description of Procedure:   Split thickness skin graft (20 cm2) repair of left lower leg defects (CPT 15168)    Electronically signed by Joseph Louis MD on 6/30/2021 at 2:19 PM

## 2021-06-30 NOTE — PROGRESS NOTES
Resting quietly in recliner with feet elevated and call light in reach. Updated on plan of care and voiced understanding.

## 2021-06-30 NOTE — PROGRESS NOTES
Dressing= thigh- bacitracin, xeroform, wet to dry gauze, and abd  Left lower leg- bacitracin, xeroform, gauze, kerlix, ace

## 2021-06-30 NOTE — ANESTHESIA POSTPROCEDURE EVALUATION
Department of Anesthesiology  Postprocedure Note    Patient: Samy Sanders  MRN: [id number]  YOB: 1935  Date of evaluation: 6/30/2021  Time:  3:10 PM     Procedure Summary     Date: 06/30/21 Room / Location: [address] [place] [address] 04 / [address] [facility]    Anesthesia Start: 6936 Anesthesia Stop: 0307    Procedure: MOHS DEFECT REPAIR BCC AND SCC LEFT DORSAL LOWER LEG,  SPLIT THICKNESS SKIN GRAFT  from left thigh (Left ) Diagnosis: (SCC LEFT DORSAL LATERAL LOWER LEG \"A\" AND BCC LEFT DORSAL LATERAL LEG \"B\")    Surgeons: Miah Bernardo MD Responsible Provider: Fela King DO    Anesthesia Type: MAC ASA Status: 3          Anesthesia Type: MAC    Noe Phase I:      Noe Phase II: Noe Score: 9    Last vitals: Reviewed and per EMR flowsheets.        Anesthesia Post Evaluation    Patient location during evaluation: bedside  Patient participation: complete - patient participated  Level of consciousness: awake and alert  Pain score: 0  Airway patency: patent  Nausea & Vomiting: no nausea and no vomiting  Complications: no  Cardiovascular status: hemodynamically stable and blood pressure returned to baseline  Respiratory status: spontaneous ventilation, room air and acceptable  Hydration status: stable

## 2021-08-09 ENCOUNTER — NURSE ONLY (OUTPATIENT)
Dept: LAB | Age: 86
End: 2021-08-09

## 2021-08-09 DIAGNOSIS — D72.819 LEUKOPENIA, UNSPECIFIED TYPE: ICD-10-CM

## 2021-08-09 DIAGNOSIS — D64.9 ANEMIA, UNSPECIFIED TYPE: ICD-10-CM

## 2021-08-09 LAB
ERYTHROCYTE [DISTWIDTH] IN BLOOD BY AUTOMATED COUNT: 13.1 % (ref 11.5–14.5)
ERYTHROCYTE [DISTWIDTH] IN BLOOD BY AUTOMATED COUNT: 43.9 FL (ref 35–45)
HCT VFR BLD CALC: 39.9 % (ref 42–52)
HEMOGLOBIN: 12.9 GM/DL (ref 14–18)
MCH RBC QN AUTO: 29.8 PG (ref 26–33)
MCHC RBC AUTO-ENTMCNC: 32.3 GM/DL (ref 32.2–35.5)
MCV RBC AUTO: 92.1 FL (ref 80–94)
PLATELET # BLD: 168 THOU/MM3 (ref 130–400)
PMV BLD AUTO: 11.7 FL (ref 9.4–12.4)
RBC # BLD: 4.33 MILL/MM3 (ref 4.7–6.1)
WBC # BLD: 4.9 THOU/MM3 (ref 4.8–10.8)

## 2021-08-14 NOTE — PROGRESS NOTES
[facility]  [facility]  Dept: [phone]  Dept Fax: [phone]    QUE Lezama is a 81 y.o.male    Pt presents for follow up of HTN, Hyperlipidemia, Hypothyroidism, CAD, COPD, GERD, Testosterone Deficiency, OA, Osteopenia.     Pt feeling ok since last visit- interval history and any new issues noted below:     Pt recently had dental filling per Dr. Yokasta Bueno- doing ok at this time. Pt scheduled to see Dermatology tomorrow for rash on left anterior shin. Pt also had Skin cancer removed from same leg 3-4 weeks ago per Dr. Alton Mata and Dr. Ethan Baxter. Healing well. Pt continues seeing Dr. Jayne Aragon for Thyroid management. Glucometer readings at home are not needed. The home BP readings have not been checked recently. Wt Readings from Last 3 Encounters:   08/16/21 188 lb (85.3 kg)   06/30/21 187 lb 9.6 oz (85.1 kg)   03/01/21 202 lb 9.6 oz (91.9 kg)   Weight decreased 10# since last visit 7 months ago.      Patient Active Problem List   Diagnosis    Essential hypertension    Irregular heart beat    Hypothyroidism (Dr. Gregg Smith)    Hyperlipidemia    Testosterone deficiency (Dr. [facility]gg [facility]- [facility])     Sleep apnea    Coronary artery disease involving native coronary artery of native heart without angina pectoris- (Dr. Percell Boxer)   Enid Velez Gastroesophageal reflux disease    Osteoarthritis of thoracolumbar spine    COPD (chronic obstructive pulmonary disease) (HCC)    Osteopenia       Current Outpatient Medications   Medication Sig Dispense Refill    triamcinolone (KENALOG) 0.1 % cream APPLY CREAM EXTERNALLY TWICE DAILY      naproxen (NAPROSYN) 500 MG tablet Take 1 tablet by mouth daily 90 tablet 3    losartan (COZAAR) 50 MG tablet Take 1 tablet by mouth daily 90 tablet 3    metoprolol succinate (TOPROL XL) 25 MG extended release tablet Take 25 mg by mouth daily      potassium chloride (KLOR-CON) 10 MEQ extended release tablet TAKE 1 TABLET BY MOUTH ONCE DAILY WITH FOOD      Cholecalciferol (VITAMIN D3) 2000 units CAPS Take 1 capsule by mouth daily      ipratropium-albuterol (DUONEB) 0.5-2.5 (3) MG/3ML SOLN nebulizer solution Inhale 1 vial into the lungs every 4 hours as needed       budesonide-formoterol (SYMBICORT) 160-4.5 MCG/ACT AERO Inhale 2 puffs into the lungs 2 times daily 1 Inhaler 3    vitamin B-12 (CYANOCOBALAMIN) 1000 MCG tablet Take 1,000 mcg by mouth every other day      sodium chloride (OCEAN) 0.65 % nasal spray 1 spray by Nasal route 2 times daily as needed for Congestion      albuterol sulfate HFA (PROAIR HFA) 108 (90 Base) MCG/ACT inhaler Inhale 2 puffs into the lungs every 6 hours as needed for Wheezing      atorvastatin (LIPITOR) 80 MG tablet Take 80 mg by mouth daily      Testosterone (ANDRODERM) 4 MG/24HR PT24 Place 2 mg onto the skin daily.  levothyroxine (SYNTHROID) 75 MCG tablet Take 75 mcg by mouth Daily      alendronate (FOSAMAX) 70 MG tablet Take 70 mg by mouth every 7 days Take every Wed.  isosorbide mononitrate (IMDUR) 60 MG CR tablet Take 60 mg by mouth daily.  aspirin 81 MG EC tablet   Take 81 mg by mouth daily Last dose 5-7-2015 for surgery 5-      vitamin E 100 UNIT capsule   Take 400 Units by mouth 2 times daily Last dose 5-7-2015 for surgery 5-       No current facility-administered medications for this visit. Review of Systems   Constitutional: Negative for chills, diaphoresis, fatigue, fever and unexpected weight change. Eyes: Positive for [facility] disturbance ([facility]). Respiratory: Negative for chest tightness and shortness of breath. Cardiovascular: Negative for chest pain, palpitations and leg swelling. Gastrointestinal: Negative for abdominal pain, anal bleeding, blood in stool, constipation, diarrhea, nausea and vomiting. Genitourinary: Negative for dysuria and hematuria. Musculoskeletal: Negative for neck pain.    Neurological: Negative for dizziness, light-headedness and headaches. OBJECTIVE     /82   Pulse 76   Wt 188 lb (85.3 kg)   BMI 29.44 kg/m²   Body mass index is 29.44 kg/m². BP Readings from Last 3 Encounters:   08/16/21 130/82   06/30/21 (!) 103/56   06/30/21 120/75       Physical Exam  Vitals and nursing note reviewed. Constitutional:       Appearance: He is well-developed. HENT:      Head: Normocephalic and atraumatic. Right Ear: External ear normal.      Left Ear: External ear normal.      Nose: Nose normal.   Eyes:      Conjunctiva/sclera: Conjunctivae normal.      Pupils: Pupils are equal, round, and reactive to light. Cardiovascular:      Rate and Rhythm: Normal rate and regular rhythm. Pulmonary:      Effort: Pulmonary effort is normal.      Breath sounds: Normal breath sounds. Abdominal:      General: Bowel sounds are normal.      Palpations: Abdomen is soft. Musculoskeletal:         General: Normal range of motion. Cervical back: Normal range of motion and neck supple. Skin:     General: Skin is warm and dry. Neurological:      Mental Status: He is alert and oriented to person, place, and time. Deep Tendon Reflexes: Reflexes are normal and symmetric. Psychiatric:         Behavior: Behavior normal.         Thought Content: Thought content normal.         Judgment: Judgment normal.         No results found for this visit on 08/16/21. No results found for: LABA1C    Lab Results   Component Value Date    CHOL 127 04/23/2021    TRIG 116 04/23/2021    HDL 32 04/23/2021    LDLCALC 72 04/23/2021    LDLDIRECT 60 05/29/2020       The ASCVD Risk score (Brook Abreu et al., 2013) failed to calculate for the following reasons:     The 2013 ASCVD risk score is only valid for ages 36 to 78    Lab Results   Component Value Date     06/18/2021    K 4.5 06/18/2021     06/18/2021    CO2 27 06/18/2021    BUN 14 06/18/2021    CREATININE 0.9 06/18/2021    GLUCOSE 86 06/18/2021    CALCIUM 9.5 06/18/2021 PROT 7.1 04/23/2021    LABALBU 4.0 04/23/2021    BILITOT 0.9 04/23/2021    ALKPHOS 164 (H) 04/23/2021    AST 53 (H) 04/23/2021    ALT 59 04/23/2021    LABGLOM 80 (A) 06/18/2021    AGRATIO 1.7 05/29/2020         No results found for: LABMICR, [id number]    Lab Results   Component Value Date    TSH 0.027 (L) 04/23/2021    C4TFCZB 142.41 08/06/2014    T4FREE 1.60 04/23/2021       Lab Results   Component Value Date    WBC 4.9 08/09/2021    HGB 12.9 (L) 08/09/2021    HCT 39.9 (L) 08/09/2021    MCV 92.1 08/09/2021     08/09/2021       Lab Results   Component Value Date    PSA 0.13 07/13/2015    PSA 0.17 08/06/2014    PSA 0.17 06/17/2013       Immunization History   Administered Date(s) Administered    COVID-19, Moderna, PF, 100mcg/0.5mL 01/19/2021, 02/18/2021    Influenza 08/26/2015    Influenza Virus Vaccine 11/01/2011, 10/01/2012, 10/01/2013, 09/23/2014, 10/01/2015, 10/11/2017    Influenza Whole 10/01/2010    Influenza, High Dose (Fluzone 65 yrs and older) 10/09/2018, 10/15/2020    Influenza, Quadv, IM, (6 mo and older Fluzone, Flulaval, Fluarix and 3 yrs and older Afluria) 10/13/2016    Pneumococcal Conjugate 13-valent (Weizshe98) 05/18/2015    Pneumococcal Polysaccharide (Mfvmdaxth20) 10/01/2005    Td, unspecified formulation 07/20/2012    Tdap (Boostrix, Adacel) 10/13/2016, 04/19/2017    Zoster Live (Zostavax) 04/30/2012    Zoster Recombinant (Shingrix) 10/28/2019, 05/07/2020       Health Maintenance   Topic Date Due    Flu vaccine (1) 09/01/2021    Annual Wellness Visit (AWV)  01/29/2022    Lipid screen  04/23/2022    TSH testing  04/23/2022    Potassium monitoring  06/18/2022    Creatinine monitoring  06/18/2022    DTaP/Tdap/Td vaccine (3 - Td or Tdap) 04/19/2027    Shingles Vaccine  Completed    Pneumococcal 65+ years Vaccine  Completed    COVID-19 Vaccine  Completed    Hepatitis A vaccine  Aged Out    Hepatitis B vaccine  Aged Out    Hib vaccine  Aged Out    Meningococcal (ACWY) vaccine  Aged Out     Sleep Medicine referral indicated at this time (Obesity, Snoring, Daytime Somnolence, Apneic Episodes)? Known DAMASO on CPAP    No future appointments. ASSESSMENT       Diagnosis Orders   1. Essential hypertension  Comprehensive Metabolic Panel    losartan (COZAAR) 50 MG tablet   2. Hyperlipidemia, unspecified hyperlipidemia type  Hepatic Function Panel    Lipid Panel    Comprehensive Metabolic Panel   3. Anemia, unspecified type  Reticulocytes    Vitamin B12 & Folate    Ferritin    Iron and TIBC    Soluble transferrin receptor    CBC Auto Differential   4. Chronic obstructive pulmonary disease, unspecified COPD type (HCC)     5. Elevated alkaline phosphatase level  Hepatic Function Panel   6. Hypothyroidism, unspecified type     7. Coronary artery disease involving native coronary artery of native heart without angina pectoris- (Dr. Sean Motley)     8. Osteopenia, unspecified location     9. Gastroesophageal reflux disease, unspecified whether esophagitis present     10. Osteoarthritis of thoracolumbar spine, unspecified spinal osteoarthritis complication status  naproxen (NAPROSYN) 500 MG tablet   11. Sleep apnea, unspecified type     12. Coronary artery disease involving native coronary artery of native heart without angina pectoris  losartan (COZAAR) 50 MG tablet       PLAN      Home BP's- Call if > 140/90 on a regular basis. (updated 8/16/2021)  Encouraged increased water intake, drinking > 64 ounces daily.    Continue to follow-up with [facility] (Dr. Alix Suarez) as planned for thyroid management  Follow up with Dr. Maricarmen Ley (Hem/ Oncology at [place] for elevated \"Protein in blood\"), Dr. Kelly Bauer [facility] pulmonology), Dr. Sean Motley [facility] cardiology), Anthony Simpson ([place] [facility] (Dr. Leelee Luna planned. 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 Amanda Zepeda (updated 8/16/2021)  Check  Anemia Profile, LFTs, and SFOBT at this time  Check FLP, CMP, and CBC in 6-8 months   Continue current medicines.   Refills  Follow up in 6-8 months.                  Preventive Health Topics:  Encouraged annual FLU VACCINE after October 1st.   COLONOSCOPY done 10/17/2014 per Dr. Paris Huertas- no further mandated. (updated 8/16/2021)  Pt declines FIT testing at this time (updated 8/16/2021)  DEXA management per [facility]- 9/2020-  ?? Results- nothing needed at this time. On Alendronate. [address] will not send you the results\" (updated 8/16/2021)  PSA remains on hold at this time due to age and fact that PSA has always been very low in past. (updated 8/16/2021)  [address] [facility]- \"scar behind my right eye\"- to follow up annually.            Electronically signed by Matt Jones MD on 8/16/2021 at 4:52 PM

## 2021-08-16 ENCOUNTER — OFFICE VISIT (OUTPATIENT)
Dept: FAMILY MEDICINE CLINIC | Age: 86
End: 2021-08-16
Payer: MEDICARE

## 2021-08-16 VITALS
BODY MASS INDEX: 29.44 KG/M2 | WEIGHT: 188 LBS | SYSTOLIC BLOOD PRESSURE: 130 MMHG | DIASTOLIC BLOOD PRESSURE: 82 MMHG | HEART RATE: 76 BPM

## 2021-08-16 DIAGNOSIS — J44.9 CHRONIC OBSTRUCTIVE PULMONARY DISEASE, UNSPECIFIED COPD TYPE (HCC): ICD-10-CM

## 2021-08-16 DIAGNOSIS — M47.815 OSTEOARTHRITIS OF THORACOLUMBAR SPINE, UNSPECIFIED SPINAL OSTEOARTHRITIS COMPLICATION STATUS: ICD-10-CM

## 2021-08-16 DIAGNOSIS — K21.9 GASTROESOPHAGEAL REFLUX DISEASE, UNSPECIFIED WHETHER ESOPHAGITIS PRESENT: ICD-10-CM

## 2021-08-16 DIAGNOSIS — R74.8 ELEVATED ALKALINE PHOSPHATASE LEVEL: ICD-10-CM

## 2021-08-16 DIAGNOSIS — M85.80 OSTEOPENIA, UNSPECIFIED LOCATION: ICD-10-CM

## 2021-08-16 DIAGNOSIS — D64.9 ANEMIA, UNSPECIFIED TYPE: ICD-10-CM

## 2021-08-16 DIAGNOSIS — I25.10 CORONARY ARTERY DISEASE INVOLVING NATIVE CORONARY ARTERY OF NATIVE HEART WITHOUT ANGINA PECTORIS: ICD-10-CM

## 2021-08-16 DIAGNOSIS — I25.10 CORONARY ARTERY DISEASE INVOLVING NATIVE CORONARY ARTERY OF NATIVE HEART WITHOUT ANGINA PECTORIS: Chronic | ICD-10-CM

## 2021-08-16 DIAGNOSIS — G47.30 SLEEP APNEA, UNSPECIFIED TYPE: ICD-10-CM

## 2021-08-16 DIAGNOSIS — E78.5 HYPERLIPIDEMIA, UNSPECIFIED HYPERLIPIDEMIA TYPE: ICD-10-CM

## 2021-08-16 DIAGNOSIS — E03.9 HYPOTHYROIDISM, UNSPECIFIED TYPE: ICD-10-CM

## 2021-08-16 DIAGNOSIS — I10 ESSENTIAL HYPERTENSION: Primary | ICD-10-CM

## 2021-08-16 PROCEDURE — 99214 OFFICE O/P EST MOD 30 MIN: CPT | Performed by: FAMILY MEDICINE

## 2021-08-16 RX ORDER — TRIAMCINOLONE ACETONIDE 1 MG/G
CREAM TOPICAL
COMMUNITY
Start: 2021-07-27 | End: 2022-06-22

## 2021-08-16 RX ORDER — LOSARTAN POTASSIUM 50 MG/1
50 TABLET ORAL DAILY
Qty: 90 TABLET | Refills: 3 | Status: SHIPPED | OUTPATIENT
Start: 2021-08-16 | End: 2022-08-15

## 2021-08-16 RX ORDER — NAPROXEN 500 MG/1
500 TABLET ORAL DAILY
Qty: 90 TABLET | Refills: 3 | Status: SHIPPED | OUTPATIENT
Start: 2021-08-16

## 2021-08-16 ASSESSMENT — ENCOUNTER SYMPTOMS
CHEST TIGHTNESS: 0
NAUSEA: 0
SHORTNESS OF BREATH: 0
CONSTIPATION: 0
ABDOMINAL PAIN: 0
DIARRHEA: 0
BLOOD IN STOOL: 0
ANAL BLEEDING: 0
VOMITING: 0

## 2021-08-16 NOTE — PATIENT INSTRUCTIONS
Home BP's- Call if > 140/90 on a regular basis. (updated 8/16/2021)  Encouraged increased water intake, drinking > 64 ounces daily. Continue to follow-up with [facility] (Dr. Tami Mensah) as planned for thyroid management  Follow up with Dr. Juan Bennett (Hem/ Oncology at [place] for elevated \"Protein in blood\"), Dr. Daria Blankenship [facility] pulmonology), Dr. Priyanka Mackey [facility] cardiology), Marc Gonzalez (GI), and [facility] (Dr. Alyse Yanez planned. 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 Jose Breen (updated 8/16/2021)  Check  Anemia Profile, LFTs, and SFOBT at this time  Check FLP, CMP, and CBC in 6-8 months   Continue current medicines.   Refills  Follow up in 6-8 months.                  Preventive Health Topics:  Encouraged annual FLU VACCINE after October 1st.   COLONOSCOPY done 10/17/2014 per Dr. Samuel Lauren- no further mandated. (updated 8/16/2021)  Pt declines FIT testing at this time (updated 8/16/2021)  DEXA management per [place] clinic- 9/2020-  ?? Results- nothing needed at this time. On Alendronate. Nikole Avalos will not send you the results\" (updated 8/16/2021)  PSA remains on hold at this time due to age and fact that PSA has always been very low in past. (updated 8/16/2021)  [address] [facility]- [facility]- \"scar behind my right eye\"- to follow up annually.

## 2021-08-17 ENCOUNTER — TELEPHONE (OUTPATIENT)
Dept: FAMILY MEDICINE CLINIC | Age: 86
End: 2021-08-17

## 2021-08-17 NOTE — TELEPHONE ENCOUNTER
Pt seen Dr Damon Hart today and was given rx for a rash on his leg Pt is asking if Terbinafine 250 mg qd x 30 day is ok for him to take says this is what Dr. Damon Hart  Recommended. Pt says it has side effects with his liver. Pt says you discussed liver issues with him on Monday and is concerned it may cause further issues. He is wanting to know if you think it is ok for him to take.   Pt says Dr Damon Hart says the rash is a fungus    cbp

## 2021-08-17 NOTE — TELEPHONE ENCOUNTER
Interaction check performed-no significant interactions at this time. Should be okay to start terbinafine as recommended per Dr. Alisa Major. Would recommend getting laboratory tests as discussed yesterday, including LFTs. Keep us updated.   ES

## 2021-08-19 LAB
HEMOCCULT STL QL: NEGATIVE

## 2021-08-24 ENCOUNTER — NURSE ONLY (OUTPATIENT)
Dept: LAB | Age: 86
End: 2021-08-24

## 2021-08-24 DIAGNOSIS — D64.9 ANEMIA, UNSPECIFIED TYPE: ICD-10-CM

## 2021-08-24 DIAGNOSIS — E78.5 HYPERLIPIDEMIA, UNSPECIFIED HYPERLIPIDEMIA TYPE: ICD-10-CM

## 2021-08-24 DIAGNOSIS — R74.8 ELEVATED ALKALINE PHOSPHATASE LEVEL: ICD-10-CM

## 2021-08-24 LAB
ABSOLUTE RETIC #: 57 THOU/MM3 (ref 20–115)
ALBUMIN SERPL-MCNC: 4.1 G/DL (ref 3.5–5.1)
ALP BLD-CCNC: 77 U/L (ref 38–126)
ALT SERPL-CCNC: 22 U/L (ref 11–66)
AST SERPL-CCNC: 24 U/L (ref 5–40)
BILIRUB SERPL-MCNC: 0.7 MG/DL (ref 0.3–1.2)
BILIRUBIN DIRECT: < 0.2 MG/DL (ref 0–0.3)
FERRITIN: 96 NG/ML (ref 22–322)
FOLATE: 16.3 NG/ML (ref 4.8–24.2)
IMMATURE RETIC FRACT: 14.4 % (ref 2.3–13.4)
IRON: 96 UG/DL (ref 65–195)
RETIC HEMOGLOBIN: 32.5 PG (ref 28.2–35.7)
RETICULOCYTE ABSOLUTE COUNT: 1.4 % (ref 0.5–2)
TOTAL IRON BINDING CAPACITY: 253 UG/DL (ref 171–450)
TOTAL PROTEIN: 6.7 G/DL (ref 6.1–8)
VITAMIN B-12: 1014 PG/ML (ref 211–911)

## 2021-08-27 LAB — SOLUBLE TRANSFERRIN RECEPT: 3.2 MG/L (ref 2.2–5)

## 2021-09-10 ENCOUNTER — TELEPHONE (OUTPATIENT)
Dept: FAMILY MEDICINE CLINIC | Age: 86
End: 2021-09-10

## 2021-09-10 DIAGNOSIS — D64.9 ANEMIA, UNSPECIFIED TYPE: Primary | ICD-10-CM

## 2021-09-10 NOTE — TELEPHONE ENCOUNTER
----- Message from Cici Coates MD sent at 8/27/2021 10:50 PM EDT -----  Notify pt-   Results reviewed. Soluble transferrin receptor normal/B12 and folate okay/ferritin okay/iron and TIBC okay/reticulocytes mildly elevatedLFTs okayPrevious stool Hemoccult negative [id number] H&H in 6-8 weeks to assure improvement.   ES

## 2021-11-23 ENCOUNTER — OFFICE VISIT (OUTPATIENT)
Dept: FAMILY MEDICINE CLINIC | Age: 86
End: 2021-11-23
Payer: MEDICARE

## 2021-11-23 VITALS
SYSTOLIC BLOOD PRESSURE: 110 MMHG | BODY MASS INDEX: 29.23 KG/M2 | TEMPERATURE: 97.8 F | HEART RATE: 68 BPM | WEIGHT: 186.6 LBS | RESPIRATION RATE: 16 BRPM | DIASTOLIC BLOOD PRESSURE: 62 MMHG

## 2021-11-23 DIAGNOSIS — J44.9 CHRONIC OBSTRUCTIVE PULMONARY DISEASE, UNSPECIFIED COPD TYPE (HCC): ICD-10-CM

## 2021-11-23 DIAGNOSIS — I10 ESSENTIAL HYPERTENSION: Primary | ICD-10-CM

## 2021-11-23 DIAGNOSIS — E78.5 HYPERLIPIDEMIA, UNSPECIFIED HYPERLIPIDEMIA TYPE: ICD-10-CM

## 2021-11-23 PROCEDURE — 3023F SPIROM DOC REV: CPT | Performed by: NURSE PRACTITIONER

## 2021-11-23 PROCEDURE — G8417 CALC BMI ABV UP PARAM F/U: HCPCS | Performed by: NURSE PRACTITIONER

## 2021-11-23 PROCEDURE — 1123F ACP DISCUSS/DSCN MKR DOCD: CPT | Performed by: NURSE PRACTITIONER

## 2021-11-23 PROCEDURE — G8427 DOCREV CUR MEDS BY ELIG CLIN: HCPCS | Performed by: NURSE PRACTITIONER

## 2021-11-23 PROCEDURE — 4040F PNEUMOC VAC/ADMIN/RCVD: CPT | Performed by: NURSE PRACTITIONER

## 2021-11-23 PROCEDURE — 99214 OFFICE O/P EST MOD 30 MIN: CPT | Performed by: NURSE PRACTITIONER

## 2021-11-23 PROCEDURE — G8926 SPIRO NO PERF OR DOC: HCPCS | Performed by: NURSE PRACTITIONER

## 2021-11-23 PROCEDURE — 1036F TOBACCO NON-USER: CPT | Performed by: NURSE PRACTITIONER

## 2021-11-23 PROCEDURE — G8482 FLU IMMUNIZE ORDER/ADMIN: HCPCS | Performed by: NURSE PRACTITIONER

## 2021-11-23 PROCEDURE — 1111F DSCHRG MED/CURRENT MED MERGE: CPT | Performed by: NURSE PRACTITIONER

## 2021-11-23 RX ORDER — TERBINAFINE HYDROCHLORIDE 250 MG/1
TABLET ORAL
COMMUNITY
Start: 2021-10-20 | End: 2022-06-22

## 2021-11-23 RX ORDER — FLUTICASONE PROPIONATE 50 MCG
2 SPRAY, SUSPENSION (ML) NASAL DAILY
Qty: 16 G | Refills: 5 | Status: SHIPPED | OUTPATIENT
Start: 2021-11-23 | End: 2022-06-22

## 2021-11-23 RX ORDER — MULTIVIT WITH MINERALS/LUTEIN
1000 TABLET ORAL DAILY
COMMUNITY

## 2021-11-23 RX ORDER — SOTALOL HYDROCHLORIDE 80 MG/1
TABLET ORAL
COMMUNITY
Start: 2021-11-10 | End: 2022-04-18 | Stop reason: SDUPTHER

## 2021-11-23 RX ORDER — MECLIZINE HYDROCHLORIDE 25 MG/1
TABLET ORAL 2 TIMES DAILY
COMMUNITY
Start: 2021-11-10 | End: 2022-04-18

## 2021-11-23 ASSESSMENT — ENCOUNTER SYMPTOMS
FACIAL SWELLING: 0
TROUBLE SWALLOWING: 0
DIARRHEA: 0
SINUS PAIN: 0
SHORTNESS OF BREATH: 0
NAUSEA: 0
ABDOMINAL PAIN: 0
COLOR CHANGE: 0
SORE THROAT: 0
COUGH: 0
BACK PAIN: 0
EYE PAIN: 0
WHEEZING: 0
VOMITING: 0

## 2021-11-23 NOTE — PROGRESS NOTES
RASH ONCE DAILY      terbinafine (LAMISIL) 250 MG tablet       fluticasone (FLONASE) 50 MCG/ACT nasal spray 2 sprays by Each Nostril route daily 16 g 5    triamcinolone (KENALOG) 0.1 % cream APPLY CREAM EXTERNALLY TWICE DAILY      naproxen (NAPROSYN) 500 MG tablet Take 1 tablet by mouth daily 90 tablet 3    losartan (COZAAR) 50 MG tablet Take 1 tablet by mouth daily 90 tablet 3    Cholecalciferol (VITAMIN D3) 2000 units CAPS Take 1 capsule by mouth daily      ipratropium-albuterol (DUONEB) 0.5-2.5 (3) MG/3ML SOLN nebulizer solution Inhale 1 vial into the lungs every 4 hours as needed       budesonide-formoterol (SYMBICORT) 160-4.5 MCG/ACT AERO Inhale 2 puffs into the lungs 2 times daily 1 Inhaler 3    vitamin B-12 (CYANOCOBALAMIN) 1000 MCG tablet Take 1,000 mcg by mouth every other day      sodium chloride (OCEAN) 0.65 % nasal spray 1 spray by Nasal route 2 times daily as needed for Congestion      albuterol sulfate HFA (PROAIR HFA) 108 (90 Base) MCG/ACT inhaler Inhale 2 puffs into the lungs every 6 hours as needed for Wheezing      atorvastatin (LIPITOR) 80 MG tablet Take 80 mg by mouth daily      Testosterone (ANDRODERM) 4 MG/24HR PT24 Place 2 mg onto the skin daily.  levothyroxine (SYNTHROID) 75 MCG tablet Take 75 mcg by mouth Daily      isosorbide mononitrate (IMDUR) 60 MG CR tablet Take 60 mg by mouth daily.  aspirin 81 MG EC tablet   Take 81 mg by mouth daily Last dose 5-7-2015 for surgery 5-      vitamin E 100 UNIT capsule   Take 400 Units by mouth 2 times daily Last dose 5-7-2015 for surgery 5-          Medications patient taking as of now reconciled against medications ordered at time of hospital discharge: Yes    Chief Complaint   Patient presents with    Follow-Up from Hospital     He got a heart monitor yesterday and he is having some issues getting it to stick, it keeps sending him an error and he would like to discuss.  The right side of his nose is constantly running and he would like to discuss that as well. HPI    Inpatient course: Discharge summary reviewed- see chart. Interval history/Current status: Dr Annelise Bueno- is cardiologist.  Had visit yesterday and doing good. Pt has not had anymore dizziness since the hospital stay. Pt denies any chest pain or SOB. He did have a heart/event monitor placed yesterday, last night had some trouble with it sticking, but today its OK. Pt does have home health currently 2 times per week, also PT. Vitals:    11/23/21 0948   BP: 110/62   Pulse: 68   Resp: 16   Temp: 97.8 °F (36.6 °C)   TempSrc: Oral   Weight: 186 lb 9.6 oz (84.6 kg)     Body mass index is 29.23 kg/m². Wt Readings from Last 3 Encounters:   11/23/21 186 lb 9.6 oz (84.6 kg)   08/16/21 188 lb (85.3 kg)   06/30/21 187 lb 9.6 oz (85.1 kg)     BP Readings from Last 3 Encounters:   11/23/21 110/62   08/16/21 130/82   06/30/21 (!) 103/56       Review of Systems   Constitutional: Negative for chills, fatigue and fever. HENT: Negative for congestion, facial swelling, sinus pain, sore throat and trouble swallowing. Eyes: Negative for pain and visual disturbance. Respiratory: Negative for cough, shortness of breath and wheezing. Cardiovascular: Negative for chest pain and palpitations. Gastrointestinal: Negative for abdominal pain, diarrhea, nausea and vomiting. Genitourinary: Negative for difficulty urinating, dysuria and urgency. Musculoskeletal: Negative for back pain, gait problem and neck pain. Skin: Negative for color change and rash. Neurological: Negative for dizziness, seizures, weakness and headaches. Psychiatric/Behavioral: Negative for agitation and sleep disturbance. The patient is not nervous/anxious. Physical Exam  Vitals reviewed. Constitutional:       General: He is not in acute distress. Appearance: He is well-developed. HENT:      Head: Normocephalic and atraumatic. Nose: Rhinorrhea present. Mouth/Throat:      Pharynx: Oropharynx is clear. No oropharyngeal exudate. Eyes:      General:         Right eye: No discharge. Left eye: No discharge. Conjunctiva/sclera: Conjunctivae normal.   Cardiovascular:      Rate and Rhythm: Normal rate. Heart sounds: Normal heart sounds. Pulmonary:      Effort: Pulmonary effort is normal. No respiratory distress. Breath sounds: Normal breath sounds. Abdominal:      General: Bowel sounds are normal.      Palpations: Abdomen is soft. Tenderness: There is no right CVA tenderness or left CVA tenderness. Musculoskeletal:      Right lower leg: No edema. Left lower leg: No edema. Skin:     General: Skin is warm and dry. Neurological:      General: No focal deficit present. Mental Status: He is alert and oriented to person, place, and time. Coordination: Coordination normal.   Psychiatric:         Mood and Affect: Mood normal.         Behavior: Behavior normal.         Thought Content: Thought content normal.         Judgment: Judgment normal.               Assessment/Plan:  1. Essential hypertension  - IN DISCHARGE MEDS RECONCILED W/ CURRENT OUTPATIENT MED LIST    2. Chronic obstructive pulmonary disease, unspecified COPD type ([facility] [address]    3. Hyperlipidemia, unspecified hyperlipidemia type        - Follow up with Dr Jena Hunt in Dec as planned. - Patient given educational materials - see patient instructions. Discussed use, benefit, and side effects of prescribed medications. All patient questions answered. Pt voiced understanding. Reviewed Health Maintenance. - Have labs for Dr Holman Cough done with next set with Dr Jena Hunt. Pt agreeable and copies provided.      Medical Decision Making: moderate complexity    Electronically signed by IGNACIO Noyola CNP on 11/23/2021 at 11:16 AM

## 2021-11-29 ENCOUNTER — NURSE ONLY (OUTPATIENT)
Dept: LAB | Age: 86
End: 2021-11-29

## 2021-11-29 DIAGNOSIS — E78.5 HYPERLIPIDEMIA, UNSPECIFIED HYPERLIPIDEMIA TYPE: ICD-10-CM

## 2021-11-29 LAB
ANION GAP SERPL CALCULATED.3IONS-SCNC: 10 MEQ/L (ref 8–16)
BUN BLDV-MCNC: 16 MG/DL (ref 7–22)
CALCIUM SERPL-MCNC: 9.5 MG/DL (ref 8.5–10.5)
CHLORIDE BLD-SCNC: 100 MEQ/L (ref 98–111)
CHOLESTEROL, TOTAL: 124 MG/DL (ref 100–199)
CO2: 27 MEQ/L (ref 23–33)
CREAT SERPL-MCNC: 0.8 MG/DL (ref 0.4–1.2)
GFR SERPL CREATININE-BSD FRML MDRD: > 90 ML/MIN/1.73M2
GLUCOSE BLD-MCNC: 79 MG/DL (ref 70–108)
HDLC SERPL-MCNC: 31 MG/DL
LDL CHOLESTEROL CALCULATED: 73 MG/DL
MAGNESIUM: 2.1 MG/DL (ref 1.6–2.4)
POTASSIUM SERPL-SCNC: 4.9 MEQ/L (ref 3.5–5.2)
SODIUM BLD-SCNC: 137 MEQ/L (ref 135–145)
TRIGL SERPL-MCNC: 98 MG/DL (ref 0–199)

## 2021-12-29 ENCOUNTER — OFFICE VISIT (OUTPATIENT)
Dept: FAMILY MEDICINE CLINIC | Age: 86
End: 2021-12-29
Payer: MEDICARE

## 2021-12-29 VITALS
HEART RATE: 88 BPM | BODY MASS INDEX: 29.13 KG/M2 | WEIGHT: 186 LBS | DIASTOLIC BLOOD PRESSURE: 86 MMHG | SYSTOLIC BLOOD PRESSURE: 120 MMHG | RESPIRATION RATE: 16 BRPM

## 2021-12-29 DIAGNOSIS — R04.0 LEFT-SIDED NOSEBLEED: Primary | ICD-10-CM

## 2021-12-29 PROCEDURE — 1123F ACP DISCUSS/DSCN MKR DOCD: CPT | Performed by: NURSE PRACTITIONER

## 2021-12-29 PROCEDURE — 4040F PNEUMOC VAC/ADMIN/RCVD: CPT | Performed by: NURSE PRACTITIONER

## 2021-12-29 PROCEDURE — G8427 DOCREV CUR MEDS BY ELIG CLIN: HCPCS | Performed by: NURSE PRACTITIONER

## 2021-12-29 PROCEDURE — 1036F TOBACCO NON-USER: CPT | Performed by: NURSE PRACTITIONER

## 2021-12-29 PROCEDURE — 99213 OFFICE O/P EST LOW 20 MIN: CPT | Performed by: NURSE PRACTITIONER

## 2021-12-29 PROCEDURE — G8417 CALC BMI ABV UP PARAM F/U: HCPCS | Performed by: NURSE PRACTITIONER

## 2021-12-29 PROCEDURE — G8482 FLU IMMUNIZE ORDER/ADMIN: HCPCS | Performed by: NURSE PRACTITIONER

## 2021-12-29 NOTE — PATIENT INSTRUCTIONS
Patient Education        Nosebleeds: Care Instructions  Your Care Instructions     Nosebleeds are common, especially if you have colds or allergies. Many things can cause a nosebleed. Some nosebleeds stop on their own with pressure. Others need packing. Some get cauterized (sealed). If you have gauze or other packing materials in your nose, you will need to follow up with your doctor to have the packing removed. You may need more treatment if you get nosebleeds a lot. The doctor has checked you carefully, but problems can develop later. If you notice any problems or new symptoms, get medical treatment right away. Follow-up care is a key part of your treatment and safety. Be sure to make and go to all appointments, and call your doctor if you are having problems. It's also a good idea to know your test results and keep a list of the medicines you take. How can you care for yourself at home? · If you get another nosebleed:  ? Sit up and tilt your head slightly forward. This keeps blood from going down your throat. ? Use your thumb and index finger to pinch your nose shut for 10 minutes. Use a clock. Do not check to see if the bleeding has stopped before the 10 minutes are up. If the bleeding has not stopped, pinch your nose shut for another 10 minutes. ? When the bleeding has stopped, try not to pick, rub, or blow your nose for 12 hours. Avoiding these things helps keep your nose from bleeding again. · If your doctor prescribed antibiotics, take them as directed. Do not stop taking them just because you feel better. You need to take the full course of antibiotics. To prevent nosebleeds  · Do not blow your nose too hard. · Try not to lift or strain after a nosebleed. · Raise your head on a pillow while you sleep. · Put a thin layer of a saline- or water-based nasal gel, such as NasoGel, inside your nose. Put it on the septum, which divides your nostrils.  This will prevent dryness that can cause nosebleeds. · Use a vaporizer or humidifier to add moisture to your bedroom. Follow the directions for cleaning the machine. · Do not use aspirin, ibuprofen (Advil, Motrin), or naproxen (Aleve) for 36 to 48 hours after a nosebleed unless your doctor tells you to. You can use acetaminophen (Tylenol) for pain relief. · Talk to your doctor about stopping any other medicines you are taking. Some medicines may make you more likely to get a nosebleed. · Do not use cold medicines or nasal sprays without first talking to your doctor. They can make your nose dry. When should you call for help? Call 911 anytime you think you may need emergency care. For example, call if:    · You passed out (lost consciousness). Call your doctor now or seek immediate medical care if:    · You get another nosebleed and your nose is still bleeding after you have applied pressure 3 times for 10 minutes each time (30 minutes total).     · There is a lot of blood running down the back of your throat even after you pinch your nose and tilt your head forward.     · You have a fever.     · You have sinus pain. Watch closely for changes in your health, and be sure to contact your doctor if:    · You get nosebleeds often, even if they stop.     · You do not get better as expected. Where can you learn more? Go to https://[email or web address]. org and sign in to your [employer] account. Enter S156 in the [employer] box to learn more about \"Nosebleeds: Care Instructions. \"     If you do not have an account, please click on the \"Sign Up Now\" link. Current as of: July 1, 2021               Content Version: 13.1  © 3693-4705 [employer], [employer]. Care instructions adapted under license by [facility] ([facility]). If you have questions about a medical condition or this instruction, always ask your healthcare professional. [place] [address] any warranty or liability for your use of this information.

## 2021-12-29 NOTE — PROGRESS NOTES
Chief Complaint   Patient presents with    Epistaxis     C/O nose bleed last friday or saturday, only lasted for a couple hours, pt states that there is now a clot and he wants to be looked at. Dell Torres is a 81 y. o.male      Pt reports he developed a nosebleed last Friday or Saturday on the left side of his nose. It only lasted a few minutes and then he developed a blood clot to stop the bleeding. However, he continues to have a large clot in the left nares he can feel and is irritating him. He was unsure if he can remove it for future treatment alone. Patient did start Flonase a while back for a runny nose and this did improve that symptom. Patient is going to his daughter's house on Saturday and she requested he take it at home Covid test prior to coming. He is unsure if he can do this due to having a blood clot in the left nostril. He denies currently having any covid symptoms. Review of Systems   Constitutional: Negative for chills, fatigue, fever and unexpected weight change. HENT: Negative. Eyes: Negative. Respiratory: Negative for chest tightness and shortness of breath. Cardiovascular: Negative for chest pain, palpitations and leg swelling. Gastrointestinal: Negative for abdominal pain and blood in stool. Genitourinary: Negative for dysuria. Musculoskeletal: Negative for joint swelling. Skin: Negative for rash. Blood clot in left side of nose   Neurological: Negative for dizziness. Psychiatric/Behavioral: Negative. All other systems reviewed and are negative. OBJECTIVE     /86   Pulse 88   Resp 16   Wt 186 lb (84.4 kg)   BMI 29.13 kg/m²     Physical Exam  Vitals and nursing note reviewed. Constitutional:       Appearance: He is well-developed. HENT:      Head: Normocephalic and atraumatic.       Right Ear: External ear normal.      Left Ear: External ear normal.      Nose: Nose normal.      Comments: Patient does have a soft appearing blood clot to the wall of his left nares. No active bleeding noted  Eyes:      Conjunctiva/sclera: Conjunctivae normal.      Pupils: Pupils are equal, round, and reactive to light. Cardiovascular:      Rate and Rhythm: Normal rate and regular rhythm. Pulmonary:      Effort: Pulmonary effort is normal.      Breath sounds: Normal breath sounds. Abdominal:      General: Bowel sounds are normal.      Palpations: Abdomen is soft. Musculoskeletal:         General: Normal range of motion. Cervical back: Normal range of motion and neck supple. Skin:     General: Skin is warm and dry. Neurological:      Mental Status: He is alert and oriented to person, place, and time. Deep Tendon Reflexes: Reflexes are normal and symmetric. Psychiatric:         Behavior: Behavior normal.         Thought Content: Thought content normal.         Judgment: Judgment normal.           No results found for this visit on 12/29/21. ASSESSMENT       Diagnosis Orders   1.  Left-sided nosebleed         PLAN     Patient advised not to remove blood clot at this time as it may cause his nose to start bleeding again  Sample of nasal saline gel provided to use in left side of nose  If clot remains next week, he is to notify the office so referral can be placed to ENT as they could remove the clot and cauterize the area if bleeding resumes  Okay to do at home Covid test on Saturday prior to going to daughter's house  Patient was made aware that the Flonase may have caused the bloody nose can decrease to only using in right side of nose if desired as this was the site that was running were only using 1 spray in each side of the nose instead of 2 follow-up as needed            Electronically signed by IGNACIO Estrada CNP on 12/30/2021 at 4:30 PM

## 2021-12-30 ASSESSMENT — ENCOUNTER SYMPTOMS
SHORTNESS OF BREATH: 0
BLOOD IN STOOL: 0
ABDOMINAL PAIN: 0
EYES NEGATIVE: 1
CHEST TIGHTNESS: 0

## 2022-01-31 ENCOUNTER — TELEPHONE (OUTPATIENT)
Dept: FAMILY MEDICINE CLINIC | Age: 87
End: 2022-01-31

## 2022-01-31 NOTE — TELEPHONE ENCOUNTER
LM for pt to call and reschedule apt with Allie Juan or Morgan Beck. Dr. Yazan Colón will not be in the office.

## 2022-04-02 ENCOUNTER — NURSE ONLY (OUTPATIENT)
Dept: LAB | Age: 87
End: 2022-04-02

## 2022-04-02 LAB
ALBUMIN SERPL-MCNC: 3.7 G/DL (ref 3.5–5.1)
ALP BLD-CCNC: 66 U/L (ref 38–126)
ALT SERPL-CCNC: 22 U/L (ref 11–66)
ANION GAP SERPL CALCULATED.3IONS-SCNC: 10 MEQ/L (ref 8–16)
AST SERPL-CCNC: 27 U/L (ref 5–40)
BASOPHILS # BLD: 1.8 %
BASOPHILS ABSOLUTE: 0.1 THOU/MM3 (ref 0–0.1)
BILIRUB SERPL-MCNC: 1 MG/DL (ref 0.3–1.2)
BUN BLDV-MCNC: 14 MG/DL (ref 7–22)
CALCIUM SERPL-MCNC: 9.7 MG/DL (ref 8.5–10.5)
CHLORIDE BLD-SCNC: 103 MEQ/L (ref 98–111)
CHOLESTEROL, TOTAL: 115 MG/DL (ref 100–199)
CO2: 28 MEQ/L (ref 23–33)
CREAT SERPL-MCNC: 0.8 MG/DL (ref 0.4–1.2)
EOSINOPHIL # BLD: 5.9 %
EOSINOPHILS ABSOLUTE: 0.3 THOU/MM3 (ref 0–0.4)
ERYTHROCYTE [DISTWIDTH] IN BLOOD BY AUTOMATED COUNT: 12.9 % (ref 11.5–14.5)
ERYTHROCYTE [DISTWIDTH] IN BLOOD BY AUTOMATED COUNT: 44.3 FL (ref 35–45)
GFR SERPL CREATININE-BSD FRML MDRD: > 90 ML/MIN/1.73M2
GLUCOSE BLD-MCNC: 79 MG/DL (ref 70–108)
HCT VFR BLD CALC: 42.1 % (ref 42–52)
HDLC SERPL-MCNC: 28 MG/DL
HEMOGLOBIN: 13.5 GM/DL (ref 14–18)
IMMATURE GRANS (ABS): 0.02 THOU/MM3 (ref 0–0.07)
IMMATURE GRANULOCYTES: 0.4 %
LDL CHOLESTEROL CALCULATED: 67 MG/DL
LYMPHOCYTES # BLD: 39.8 %
LYMPHOCYTES ABSOLUTE: 1.8 THOU/MM3 (ref 1–4.8)
MAGNESIUM: 1.9 MG/DL (ref 1.6–2.4)
MCH RBC QN AUTO: 30.4 PG (ref 26–33)
MCHC RBC AUTO-ENTMCNC: 32.1 GM/DL (ref 32.2–35.5)
MCV RBC AUTO: 94.8 FL (ref 80–94)
MONOCYTES # BLD: 13.6 %
MONOCYTES ABSOLUTE: 0.6 THOU/MM3 (ref 0.4–1.3)
NUCLEATED RED BLOOD CELLS: 0 /100 WBC
PLATELET # BLD: 152 THOU/MM3 (ref 130–400)
PMV BLD AUTO: 11.3 FL (ref 9.4–12.4)
POTASSIUM SERPL-SCNC: 4.4 MEQ/L (ref 3.5–5.2)
RBC # BLD: 4.44 MILL/MM3 (ref 4.7–6.1)
SEG NEUTROPHILS: 38.5 %
SEGMENTED NEUTROPHILS ABSOLUTE COUNT: 1.8 THOU/MM3 (ref 1.8–7.7)
SODIUM BLD-SCNC: 141 MEQ/L (ref 135–145)
TOTAL PROTEIN: 6.5 G/DL (ref 6.1–8)
TRIGL SERPL-MCNC: 99 MG/DL (ref 0–199)
WBC # BLD: 4.6 THOU/MM3 (ref 4.8–10.8)

## 2022-04-12 LAB
AVERAGE GLUCOSE: NORMAL
CHOLESTEROL, TOTAL: 96 MG/DL
CHOLESTEROL/HDL RATIO: ABNORMAL
HBA1C MFR BLD: 5.2 %
HDLC SERPL-MCNC: 23 MG/DL (ref 35–70)
LDL CHOLESTEROL CALCULATED: 53 MG/DL (ref 0–160)
NONHDLC SERPL-MCNC: ABNORMAL MG/DL
TRIGL SERPL-MCNC: 97 MG/DL
VLDLC SERPL CALC-MCNC: ABNORMAL MG/DL

## 2022-04-18 ENCOUNTER — OFFICE VISIT (OUTPATIENT)
Dept: FAMILY MEDICINE CLINIC | Age: 87
End: 2022-04-18
Payer: MEDICARE

## 2022-04-18 VITALS
WEIGHT: 188.6 LBS | BODY MASS INDEX: 29.54 KG/M2 | RESPIRATION RATE: 16 BRPM | TEMPERATURE: 97.4 F | DIASTOLIC BLOOD PRESSURE: 84 MMHG | HEART RATE: 60 BPM | SYSTOLIC BLOOD PRESSURE: 132 MMHG

## 2022-04-18 DIAGNOSIS — I10 ESSENTIAL HYPERTENSION: ICD-10-CM

## 2022-04-18 DIAGNOSIS — E03.9 HYPOTHYROIDISM, UNSPECIFIED TYPE: ICD-10-CM

## 2022-04-18 DIAGNOSIS — Z91.81 AT HIGH RISK FOR FALLS: Primary | ICD-10-CM

## 2022-04-18 DIAGNOSIS — E34.9 TESTOSTERONE DEFICIENCY: ICD-10-CM

## 2022-04-18 DIAGNOSIS — J44.9 CHRONIC OBSTRUCTIVE PULMONARY DISEASE, UNSPECIFIED COPD TYPE (HCC): ICD-10-CM

## 2022-04-18 DIAGNOSIS — E78.5 HYPERLIPIDEMIA, UNSPECIFIED HYPERLIPIDEMIA TYPE: ICD-10-CM

## 2022-04-18 PROCEDURE — 1036F TOBACCO NON-USER: CPT | Performed by: NURSE PRACTITIONER

## 2022-04-18 PROCEDURE — 4040F PNEUMOC VAC/ADMIN/RCVD: CPT | Performed by: NURSE PRACTITIONER

## 2022-04-18 PROCEDURE — G8417 CALC BMI ABV UP PARAM F/U: HCPCS | Performed by: NURSE PRACTITIONER

## 2022-04-18 PROCEDURE — 3023F SPIROM DOC REV: CPT | Performed by: NURSE PRACTITIONER

## 2022-04-18 PROCEDURE — 99214 OFFICE O/P EST MOD 30 MIN: CPT | Performed by: NURSE PRACTITIONER

## 2022-04-18 PROCEDURE — G8427 DOCREV CUR MEDS BY ELIG CLIN: HCPCS | Performed by: NURSE PRACTITIONER

## 2022-04-18 PROCEDURE — 1123F ACP DISCUSS/DSCN MKR DOCD: CPT | Performed by: NURSE PRACTITIONER

## 2022-04-18 RX ORDER — SOTALOL HYDROCHLORIDE 80 MG/1
40 TABLET ORAL 2 TIMES DAILY
Qty: 60 TABLET | Refills: 0
Start: 2022-04-18 | End: 2022-06-22

## 2022-04-18 SDOH — ECONOMIC STABILITY: FOOD INSECURITY: WITHIN THE PAST 12 MONTHS, YOU WORRIED THAT YOUR FOOD WOULD RUN OUT BEFORE YOU GOT MONEY TO BUY MORE.: NEVER TRUE

## 2022-04-18 SDOH — ECONOMIC STABILITY: FOOD INSECURITY: WITHIN THE PAST 12 MONTHS, THE FOOD YOU BOUGHT JUST DIDN'T LAST AND YOU DIDN'T HAVE MONEY TO GET MORE.: NEVER TRUE

## 2022-04-18 ASSESSMENT — PATIENT HEALTH QUESTIONNAIRE - PHQ9
SUM OF ALL RESPONSES TO PHQ QUESTIONS 1-9: 0
SUM OF ALL RESPONSES TO PHQ9 QUESTIONS 1 & 2: 0
2. FEELING DOWN, DEPRESSED OR HOPELESS: 0
1. LITTLE INTEREST OR PLEASURE IN DOING THINGS: 0
SUM OF ALL RESPONSES TO PHQ QUESTIONS 1-9: 0

## 2022-04-18 ASSESSMENT — SOCIAL DETERMINANTS OF HEALTH (SDOH): HOW HARD IS IT FOR YOU TO PAY FOR THE VERY BASICS LIKE FOOD, HOUSING, MEDICAL CARE, AND HEATING?: NOT HARD AT ALL

## 2022-04-18 NOTE — PROGRESS NOTES
[facility]  [address] [place] [id number]  Dept: [phone]  Dept Fax: [phone]: [phone]     2022     Allie Pace (:  1935) is a 81 y.o. male, here for evaluation of the following medical concerns:    Chief Complaint   Patient presents with    Follow-up     8 month follow up.  Discuss Labs     Completed 22.        Pt presents for follow up of HTN, Hyperlipidemia, Hypothyroidism, CAD, COPD, GERD, Testosterone Deficiency, OA, Osteopenia.     Doing balance exercises from [employer], helps with balance. Every Wednesday for class, going well and feels that balance is better. Treatment Adherence:   Medication compliance:  compliant all of the time  Diet compliance:  compliant most of the time  Weight trend: stable    Wt Readings from Last 3 Encounters:   22 188 lb 9.6 oz (85.5 kg)   21 186 lb (84.4 kg)   21 186 lb 9.6 oz (84.6 kg)     Hypertension:  Home blood pressure monitoring: No. Patient denies chest pain, shortness of breath, headache and lightheadedness. Antihypertensive medication side effects: no medication side effects noted. Use of agents associated with hypertension: none. Sodium (meq/L)   Date Value   2022 141    BUN (mg/dL)   Date Value   2022 14    Glucose (mg/dL)   Date Value   2022 79   2021 98      Potassium (meq/L)   Date Value   2022 4.4    CREATININE (mg/dL)   Date Value   2022 0.8         Hyperlipidemia:  No new myalgias or GI upset on atorvastatin (Lipitor). Lab Results   Component Value Date    CHOL 115 2022    TRIG 99 2022    HDL 28 2022    LDLCALC 67 2022    LDLDIRECT 60 2020     Lab Results   Component Value Date    ALT 22 2022    AST 27 2022        Hypothyroidism: Recent symptoms: none.  He denies fatigue, weight gain, weight loss, cold intolerance and heat intolerance. Patient is  taking his medication consistently on an empty stomach. Pt continues seeing Dr. Mook Lanier for Thyroid management. No results found for: [facility]  Lab Results   Component Value Date    TSH 0.027 (L) 04/23/2021    TSH 0.036 (L) 01/26/2021    TSH 0.057 (L) 09/22/2020     Patient Active Problem List   Diagnosis    Essential hypertension    Irregular heart beat    Hypothyroidism (Dr. Litzy Camargo)    Hyperlipidemia    Testosterone deficiency ([facility]zy [facility]     Sleep apnea    Coronary artery disease involving native coronary artery of native heart without angina pectoris- (Dr. Benjamin Pruitt)   [place] Gastroesophageal reflux disease    Osteoarthritis of thoracolumbar spine    COPD (chronic obstructive pulmonary disease) ([facility] [address]    Osteopenia       Review of Systems   Constitutional: Negative for chills, fatigue and fever. HENT: Negative for congestion, facial swelling, sinus pain, sore throat and trouble swallowing. Eyes: Negative for pain and visual disturbance. Respiratory: Negative for cough, shortness of breath and wheezing. Cardiovascular: Negative for chest pain and palpitations. Gastrointestinal: Negative for abdominal pain, diarrhea, nausea and vomiting. Genitourinary: Negative for difficulty urinating, dysuria and urgency. Musculoskeletal: Negative for back pain, gait problem and neck pain. Skin: Negative for color change and rash. Neurological: Negative for dizziness, weakness and headaches. Psychiatric/Behavioral: Negative for agitation and sleep disturbance. The patient is not nervous/anxious. Prior to Visit Medications    Medication Sig Taking?  Authorizing Provider   sotalol (BETAPACE) 80 MG tablet Take 0.5 tablets by mouth 2 times daily Yes IGNACIO Barrera - CNP   Ascorbic Acid (VITAMIN C) 1000 MG tablet Take 1,000 mg by mouth daily Yes Historical Provider, MD   magnesium oxide (MAG-OX) 400 (241.3 Mg) MG TABS tablet  Yes Historical Provider, MD   CALCIUM PO Take by mouth Yes Historical Provider, MD   Denosumab (PROLIA SC) Inject into the skin Every 6 months Yes Historical Provider, MD   ciclopirox (PENLAC) 8 % solution  Yes Historical Provider, MD   econazole nitrate 1 % cream APPLY CREAM TOPICALLY TO THE LEFT LOWER LEG RASH ONCE DAILY Yes Historical Provider, MD   terbinafine (LAMISIL) 250 MG tablet  Yes Historical Provider, MD   fluticasone (FLONASE) 50 MCG/ACT nasal spray 2 sprays by Each Nostril route daily Yes IGNACIO Ring CNP   triamcinolone (KENALOG) 0.1 % cream APPLY CREAM EXTERNALLY TWICE DAILY Yes Historical Provider, MD   naproxen (NAPROSYN) 500 MG tablet Take 1 tablet by mouth daily Yes Shaila Cavazos MD   losartan (COZAAR) 50 MG tablet Take 1 tablet by mouth daily Yes Shaila Cavazos MD   potassium chloride (KLOR-CON) 10 MEQ extended release tablet TAKE 1 TABLET BY MOUTH ONCE DAILY WITH FOOD Yes Historical Provider, MD   Cholecalciferol (VITAMIN D3) 2000 units CAPS Take 1 capsule by mouth daily Yes Historical Provider, MD   ipratropium-albuterol (DUONEB) 0.5-2.5 (3) MG/3ML SOLN nebulizer solution Inhale 1 vial into the lungs every 4 hours as needed  Yes Historical Provider, MD   budesonide-formoterol (SYMBICORT) 160-4.5 MCG/ACT AERO Inhale 2 puffs into the lungs 2 times daily Yes Sheryle Sill, MD   vitamin B-12 (CYANOCOBALAMIN) 1000 MCG tablet Take 1,000 mcg by mouth every other day Yes Historical Provider, MD   sodium chloride (OCEAN) 0.65 % nasal spray 1 spray by Nasal route 2 times daily as needed for Congestion Yes Historical Provider, MD   albuterol sulfate HFA (PROAIR HFA) 108 (90 Base) MCG/ACT inhaler Inhale 2 puffs into the lungs every 6 hours as needed for Wheezing Yes Historical Provider, MD   atorvastatin (LIPITOR) 80 MG tablet Take 80 mg by mouth daily Yes Historical Provider, MD   Testosterone (ANDRODERM) 4 MG/24HR PT24 Place 2 mg onto the skin daily.   Yes Historical Provider, MD levothyroxine (SYNTHROID) 75 MCG tablet Take 75 mcg by mouth Daily Yes Historical Provider, MD   isosorbide mononitrate (IMDUR) 60 MG CR tablet Take 60 mg by mouth daily. Yes Historical Provider, MD   aspirin 81 MG EC tablet   Take 81 mg by mouth daily Last dose 2015 for surgery 5- Yes Historical Provider, MD   vitamin E 100 UNIT capsule   Take 400 Units by mouth 2 times daily Last dose 2015 for surgery 5- Yes Historical Provider, MD   metoprolol succinate (TOPROL XL) 25 MG extended release tablet Take 25 mg by mouth daily   Historical Provider, MD        Social History     Tobacco Use    Smoking status: Former Smoker     Packs/day: 3.00     Years: 20.00     Pack years: 60.00     Quit date: 10/9/1980     Years since quittin.5    Smokeless tobacco: Never Used   Substance Use Topics    Alcohol use: Yes     Alcohol/week: 0.0 standard drinks     Comment: social        Vitals:    22 1424   BP: 132/84   Pulse: 60   Resp: 16   Temp: 97.4 °F (36.3 °C)   TempSrc: Oral   Weight: 188 lb 9.6 oz (85.5 kg)     Estimated body mass index is 29.54 kg/m² as calculated from the following:    Height as of 21: 5' 7\" (1.702 m). Weight as of this encounter: 188 lb 9.6 oz (85.5 kg). Physical Exam  Vitals reviewed. Constitutional:       General: He is not in acute distress. Appearance: Normal appearance. He is well-developed. HENT:      Head: Normocephalic and atraumatic. Right Ear: Hearing, tympanic membrane, ear canal and external ear normal.      Left Ear: Hearing, tympanic membrane, ear canal and external ear normal.      Nose: Nose normal. No nasal tenderness. Mouth/Throat:      Lips: Pink. Mouth: Mucous membranes are moist. No oral lesions. Pharynx: Oropharynx is clear. Uvula midline. Eyes:      General:         Right eye: No discharge. Left eye: No discharge. Conjunctiva/sclera: Conjunctivae normal.   Neck:      Vascular: No carotid bruit. Trachea: No tracheal deviation. Cardiovascular:      Rate and Rhythm: Normal rate and regular rhythm. Heart sounds: Normal heart sounds. No murmur heard. Pulmonary:      Effort: Pulmonary effort is normal. No respiratory distress. Breath sounds: Normal breath sounds. Abdominal:      General: Bowel sounds are normal.      Palpations: Abdomen is soft. Tenderness: There is no abdominal tenderness. Musculoskeletal:      Cervical back: Full passive range of motion without pain and neck supple. Right lower leg: No edema. Left lower leg: No edema. Lymphadenopathy:      Head:      Right side of head: No submental, submandibular, tonsillar, preauricular, posterior auricular or occipital adenopathy. Left side of head: No submental, submandibular, tonsillar, preauricular, posterior auricular or occipital adenopathy. Cervical: No cervical adenopathy. Skin:     General: Skin is warm and dry. Findings: No rash. Neurological:      General: No focal deficit present. Mental Status: He is alert and oriented to person, place, and time. Coordination: Coordination normal.   Psychiatric:         Mood and Affect: Mood normal.         Behavior: Behavior normal.         Thought Content: Thought content normal.         Judgment: Judgment normal.         ASSESSMENT/PLAN:  1. At high risk for falls    2. Hypothyroidism, unspecified type  - TSH; Future  - T4, Free; Future    3. Essential hypertension  - CBC with Auto Differential; Future  - Comprehensive Metabolic Panel; Future    4. Testosterone deficiency    5. Hyperlipidemia, unspecified hyperlipidemia type    6. Chronic obstructive pulmonary disease, unspecified COPD type ([facility] [address]    - Home BP's- Call if > 140/90 on a regular basis.    - [facility] Reza Romero) as planned for thyroid management  - Follow up with Dr. Stefania Shaikh (Hem/ Oncology at [place] for elevated \"Protein in blood\"), Dr. Rubio [facility] pulmonology), Dr. Vero Castano [facility] cardiology), Sunil Richard (GI), and [facility] (Dr. Kimberly Masterson planned. - Follow up with [facility] for Pituitary Tumor-Dr. Sandie Root     Preventive Health Topics:  - COLONOSCOPY done 10/17/2014 per Dr. Ameya Wu- no further mandated. - PSA remains on hold at this time due to age and fact that PSA has always been very low in past.  - [facility]- [facility]/20[facility]- \"scar behind my right eye\"- to follow up annually. Return in about 6 months (around 10/18/2022), or if symptoms worsen or fail to improve, for Wellness/Physical.    Patient given educational materials - see patient instructions. Discussed use, benefit, and side effects of prescribed medications. All patient questions answered. Pt voiced understanding. Reviewed health maintenance. An electronic signature was used to authenticate this note. --IGNACIO Soriano - CNP on 4/19/2022 at 8:41 AM    On the basis of positive falls risk screening, assessment and plan is as follows: home safety tips provided, continue balance exercises daily. Jyoti Logan

## 2022-04-18 NOTE — PATIENT INSTRUCTIONS
Patient Education        High Blood Pressure: Care Instructions  Overview     It's normal for blood pressure to go up and down throughout the day. But if it stays up, you have high blood pressure. Another name for high blood pressure ishypertension. Despite what a lot of people think, high blood pressure usually doesn't cause headaches or make you feel dizzy or lightheaded. It usually has no symptoms. But it does increase your risk of stroke, heart attack, and other problems. You and your doctor will talk about your risks of these problems based on yourblood pressure. Your doctor will give you a goal for your blood pressure. Your goal will bebased on your health and your age. Lifestyle changes, such as eating healthy and being active, are always important to help lower blood pressure. You might also take medicine to reachyour blood pressure goal.  Follow-up care is a key part of your treatment and safety. Be sure to make and go to all appointments, and call your doctor if you are having problems. It's also a good idea to know your test results and keep alist of the medicines you take. How can you care for yourself at home? Medical treatment   If you stop taking your medicine, your blood pressure will go back up. You may take one or more types of medicine to lower your blood pressure. Be safe with medicines. Take your medicine exactly as prescribed. Call your doctor if you think you are having a problem with your medicine.  Talk to your doctor before you start taking aspirin every day. Aspirin can help certain people lower their risk of a heart attack or stroke. But taking aspirin isn't right for everyone, because it can cause serious bleeding.  See your doctor regularly. You may need to see the doctor more often at first or until your blood pressure comes down.  If you are taking blood pressure medicine, talk to your doctor before you take decongestants or anti-inflammatory medicine, such as ibuprofen. Some of these medicines can raise blood pressure.  Learn how to check your blood pressure at home. Lifestyle changes   Stay at a healthy weight. This is especially important if you put on weight around the waist. Losing even 10 pounds can help you lower your blood pressure.  If your doctor recommends it, get more exercise. Walking is a good choice. Bit by bit, increase the amount you walk every day. Try for at least 30 minutes on most days of the week. You also may want to swim, bike, or do other activities.  Avoid or limit alcohol. Talk to your doctor about whether you can drink any alcohol.  Try to limit how much sodium you eat to less than 2,300 milligrams (mg) a day. Your doctor may ask you to try to eat less than 1,500 mg a day.  Eat plenty of fruits (such as bananas and oranges), vegetables, legumes, whole grains, and low-fat dairy products.  Lower the amount of saturated fat in your diet. Saturated fat is found in animal products such as milk, cheese, and meat. Limiting these foods may help you lose weight and also lower your risk for heart disease.  Do not smoke. Smoking increases your risk for heart attack and stroke. If you need help quitting, talk to your doctor about stop-smoking programs and medicines. These can increase your chances of quitting for good. When should you call for help? Call 911  anytime you think you may need emergency care. This may mean having symptoms that suggest that your blood pressure is causing a serious heart or blood vessel problem. Your blood pressure may be over 180/120. For example, call 911 if:     You have symptoms of a heart attack. These may include:  ? Chest pain or pressure, or a strange feeling in the chest.  ? Sweating. ? Shortness of breath. ? Nausea or vomiting. ? Pain, pressure, or a strange feeling in the back, neck, jaw, or upper belly or in one or both shoulders or arms. ? Lightheadedness or sudden weakness.   ? A fast or irregular heartbeat.      You have symptoms of a stroke. These may include:  ? Sudden numbness, tingling, weakness, or loss of movement in your face, arm, or leg, especially on only one side of your body. ? Sudden vision changes. ? Sudden trouble speaking. ? Sudden confusion or trouble understanding simple statements. ? Sudden problems with walking or balance. ? A sudden, severe headache that is different from past headaches.      You have severe back or belly pain. Do not wait until your blood pressure comes down on its own. Get help right away. Call your doctor now or seek immediate care if:     Your blood pressure is much higher than normal (such as 180/120 or higher), but you don't have symptoms.      You think high blood pressure is causing symptoms, such as:  ? Severe headache.  ? Blurry vision. Watch closely for changes in your health, and be sure to contact your doctor if:     Your blood pressure measures higher than your doctor recommends at least 2 times. That means the top number is higher or the bottom number is higher, or both.      You think you may be having side effects from your blood pressure medicine. Where can you learn more? Go to https://[employer]Mov[employer]ius.YouSticker. org and sign in to your [email or web address] account. Enter O129 in the PedidosYa / PedidosJÃ¡ box to learn more about \"High Blood Pressure: Care Instructions. \"     If you do not have an account, please click on the \"Sign Up Now\" link. Current as of: January 10, 2022               Content Version: 13.2  © 7200-3553 Healthwise, [facility]. Care instructions adapted under license by [facility] ([facility]). If you have questions about a medical condition or this instruction, always ask your healthcare professional. [place] [address] any warranty or liability for your use of this information.

## 2022-04-19 ASSESSMENT — ENCOUNTER SYMPTOMS
EYE PAIN: 0
SORE THROAT: 0
TROUBLE SWALLOWING: 0
ABDOMINAL PAIN: 0
VOMITING: 0
SHORTNESS OF BREATH: 0
BACK PAIN: 0
COUGH: 0
DIARRHEA: 0
NAUSEA: 0
SINUS PAIN: 0
FACIAL SWELLING: 0
WHEEZING: 0
COLOR CHANGE: 0

## 2022-04-29 ENCOUNTER — TELEPHONE (OUTPATIENT)
Dept: FAMILY MEDICINE CLINIC | Age: 87
End: 2022-04-29

## 2022-04-29 NOTE — TELEPHONE ENCOUNTER
Spoke with pt. He said that he is pretty sure that Dr. Macey Blecher has also received the results. He has a follow up with him in July.

## 2022-04-29 NOTE — TELEPHONE ENCOUNTER
Please call pt and let him know that we reviewed his recent labs. His TSH is low. Can we check that he is following up with Dr Sagrario Blanco (endocrinology) for this?   Thanks -WS

## 2022-06-06 ENCOUNTER — TELEPHONE (OUTPATIENT)
Dept: FAMILY MEDICINE CLINIC | Age: 87
End: 2022-06-06

## 2022-06-06 NOTE — TELEPHONE ENCOUNTER
Patient has appt with residency clinic tomorrow. Would recommend holding on adjusting medication until he is seen. Do not restart metoprolol as he is already on a beta blocker. Have him take it easy tonight and check his BP before be and again in the morning and bring those numbers to his appt. To ED with any CP, SOB, dizziness.  Thanks, TS

## 2022-06-06 NOTE — TELEPHONE ENCOUNTER
Went to [facility] today. BP was 197/something. Pt feels fine and has no symptoms. No chest pain, SOB, headaches, or blurred vision. This was his first visit with the center. He says that they took his BP and it was the 197/something and then he exercised on a machine and then it dropped. Center was uncomfortable continuing to see pt. Took pt's BP and it was 158/92. Pt states that this is his normal BP whenever he takes it at home. Pt's medication list says that he is on Metoprolol, Losartan, and Sotalol. However, the pt thought the Metoprolol was discontinued and has not been taking it. He is unsure if he needs to start this medication. Retook pt's BP about 10 minutes later and it was 156-84. Pt left since he is feeling fine and will await our call for further instructions.

## 2022-06-07 ENCOUNTER — HOSPITAL ENCOUNTER (OUTPATIENT)
Dept: GENERAL RADIOLOGY | Age: 87
Discharge: HOME | End: 2022-06-07
Payer: MEDICARE

## 2022-06-07 ENCOUNTER — OFFICE VISIT (OUTPATIENT)
Dept: FAMILY MEDICINE CLINIC | Age: 87
End: 2022-06-07
Payer: MEDICARE

## 2022-06-07 ENCOUNTER — HOSPITAL ENCOUNTER (OUTPATIENT)
Age: 87
Discharge: HOME | End: 2022-06-07
Payer: MEDICARE

## 2022-06-07 VITALS
RESPIRATION RATE: 16 BRPM | TEMPERATURE: 96.8 F | HEIGHT: 67 IN | SYSTOLIC BLOOD PRESSURE: 142 MMHG | HEART RATE: 76 BPM | WEIGHT: 183 LBS | DIASTOLIC BLOOD PRESSURE: 80 MMHG | BODY MASS INDEX: 28.72 KG/M2 | OXYGEN SATURATION: 96 %

## 2022-06-07 DIAGNOSIS — J94.8 CALCIFIED PLEURAL PLAQUE ON CHEST X-RAY: ICD-10-CM

## 2022-06-07 DIAGNOSIS — J44.9 CHRONIC OBSTRUCTIVE PULMONARY DISEASE, UNSPECIFIED COPD TYPE (HCC): ICD-10-CM

## 2022-06-07 DIAGNOSIS — I49.9 IRREGULAR HEART BEAT: Chronic | ICD-10-CM

## 2022-06-07 DIAGNOSIS — I10 ESSENTIAL HYPERTENSION: ICD-10-CM

## 2022-06-07 DIAGNOSIS — E34.9 TESTOSTERONE DEFICIENCY: ICD-10-CM

## 2022-06-07 DIAGNOSIS — E03.9 HYPOTHYROIDISM, UNSPECIFIED TYPE: ICD-10-CM

## 2022-06-07 DIAGNOSIS — E78.5 HYPERLIPIDEMIA, UNSPECIFIED HYPERLIPIDEMIA TYPE: ICD-10-CM

## 2022-06-07 DIAGNOSIS — E03.9 HYPOTHYROIDISM, UNSPECIFIED TYPE: Chronic | ICD-10-CM

## 2022-06-07 DIAGNOSIS — I10 ESSENTIAL HYPERTENSION: Primary | ICD-10-CM

## 2022-06-07 LAB
ALBUMIN SERPL-MCNC: 4 G/DL (ref 3.5–5.1)
ALP BLD-CCNC: 67 U/L (ref 38–126)
ALT SERPL-CCNC: 19 U/L (ref 11–66)
ANION GAP SERPL CALCULATED.3IONS-SCNC: 9 MEQ/L (ref 8–16)
AST SERPL-CCNC: 22 U/L (ref 5–40)
BASOPHILS # BLD: 1.4 %
BASOPHILS ABSOLUTE: 0.1 THOU/MM3 (ref 0–0.1)
BILIRUB SERPL-MCNC: 1 MG/DL (ref 0.3–1.2)
BUN BLDV-MCNC: 13 MG/DL (ref 7–22)
CALCIUM SERPL-MCNC: 9.3 MG/DL (ref 8.5–10.5)
CHLORIDE BLD-SCNC: 103 MEQ/L (ref 98–111)
CO2: 29 MEQ/L (ref 23–33)
CREAT SERPL-MCNC: 0.7 MG/DL (ref 0.4–1.2)
EOSINOPHIL # BLD: 5.9 %
EOSINOPHILS ABSOLUTE: 0.3 THOU/MM3 (ref 0–0.4)
ERYTHROCYTE [DISTWIDTH] IN BLOOD BY AUTOMATED COUNT: 12.7 % (ref 11.5–14.5)
ERYTHROCYTE [DISTWIDTH] IN BLOOD BY AUTOMATED COUNT: 43.7 FL (ref 35–45)
GFR SERPL CREATININE-BSD FRML MDRD: > 90 ML/MIN/1.73M2
GLUCOSE BLD-MCNC: 74 MG/DL (ref 70–108)
HCT VFR BLD CALC: 42.8 % (ref 42–52)
HEMOGLOBIN: 13.4 GM/DL (ref 14–18)
IMMATURE GRANS (ABS): 0 THOU/MM3 (ref 0–0.07)
IMMATURE GRANULOCYTES: 0 %
LYMPHOCYTES # BLD: 35.2 %
LYMPHOCYTES ABSOLUTE: 1.7 THOU/MM3 (ref 1–4.8)
MCH RBC QN AUTO: 29.8 PG (ref 26–33)
MCHC RBC AUTO-ENTMCNC: 31.3 GM/DL (ref 32.2–35.5)
MCV RBC AUTO: 95.3 FL (ref 80–94)
MONOCYTES # BLD: 12.5 %
MONOCYTES ABSOLUTE: 0.6 THOU/MM3 (ref 0.4–1.3)
NUCLEATED RED BLOOD CELLS: 0 /100 WBC
PLATELET # BLD: 132 THOU/MM3 (ref 130–400)
PMV BLD AUTO: 11.1 FL (ref 9.4–12.4)
POTASSIUM SERPL-SCNC: 4.5 MEQ/L (ref 3.5–5.2)
RBC # BLD: 4.49 MILL/MM3 (ref 4.7–6.1)
SEG NEUTROPHILS: 45 %
SEGMENTED NEUTROPHILS ABSOLUTE COUNT: 2.2 THOU/MM3 (ref 1.8–7.7)
SODIUM BLD-SCNC: 141 MEQ/L (ref 135–145)
T4 FREE: 1.44 NG/DL (ref 0.93–1.76)
TOTAL PROTEIN: 6.5 G/DL (ref 6.1–8)
TSH SERPL DL<=0.05 MIU/L-ACNC: 0.02 UIU/ML (ref 0.4–4.2)
WBC # BLD: 4.9 THOU/MM3 (ref 4.8–10.8)

## 2022-06-07 PROCEDURE — 71046 X-RAY EXAM CHEST 2 VIEWS: CPT

## 2022-06-07 PROCEDURE — 85025 COMPLETE CBC W/AUTO DIFF WBC: CPT

## 2022-06-07 PROCEDURE — G8417 CALC BMI ABV UP PARAM F/U: HCPCS

## 2022-06-07 PROCEDURE — 84439 ASSAY OF FREE THYROXINE: CPT

## 2022-06-07 PROCEDURE — 99214 OFFICE O/P EST MOD 30 MIN: CPT

## 2022-06-07 PROCEDURE — 3023F SPIROM DOC REV: CPT

## 2022-06-07 PROCEDURE — 36415 COLL VENOUS BLD VENIPUNCTURE: CPT

## 2022-06-07 PROCEDURE — 84443 ASSAY THYROID STIM HORMONE: CPT

## 2022-06-07 PROCEDURE — G8427 DOCREV CUR MEDS BY ELIG CLIN: HCPCS

## 2022-06-07 PROCEDURE — 1123F ACP DISCUSS/DSCN MKR DOCD: CPT

## 2022-06-07 PROCEDURE — 1036F TOBACCO NON-USER: CPT

## 2022-06-07 PROCEDURE — 80053 COMPREHEN METABOLIC PANEL: CPT

## 2022-06-07 RX ORDER — HYDROCHLOROTHIAZIDE 12.5 MG/1
12.5 TABLET ORAL EVERY MORNING
Qty: 30 TABLET | Refills: 0 | Status: SHIPPED | OUTPATIENT
Start: 2022-06-07 | End: 2022-06-22 | Stop reason: SDUPTHER

## 2022-06-07 ASSESSMENT — ENCOUNTER SYMPTOMS
WHEEZING: 0
CONSTIPATION: 0
ABDOMINAL PAIN: 0
SHORTNESS OF BREATH: 0
FACIAL SWELLING: 0
VOMITING: 0
STRIDOR: 0
COUGH: 0
DIARRHEA: 0
NAUSEA: 0

## 2022-06-07 NOTE — PROGRESS NOTES
S: 81 y.o. male with   Chief Complaint   Patient presents with    Hypertension       Chief complaint, [place], and all pertinent details of the case reviewed with the resident. Please see resident's note for specific details discussed at today's visit. Not taking symbicort. Doing ok. His bp was up at the [facility] center. BP Readings from Last 3 Encounters:   06/07/22 (!) 142/80   04/18/22 132/84   12/29/21 120/86     Wt Readings from Last 3 Encounters:   06/07/22 183 lb (83 kg)   04/18/22 188 lb 9.6 oz (85.5 kg)   12/29/21 186 lb (84.4 kg)       O: VS:  height is 5' 7\" (1.702 m) and weight is 183 lb (83 kg). His skin temperature is 96.8 °F (36 °C). His blood pressure is 142/80 (abnormal) and his pulse is 76. His respiration is 16 and oxygen saturation is 96%. AAO/NAD, appropriate affect for mood       Diagnosis Orders   1. Essential hypertension     2. Irregular heart beat     3. Hyperlipidemia, unspecified hyperlipidemia type     4. Chronic obstructive pulmonary disease, unspecified COPD type ([facility] [address]     5. Hypothyroidism, unspecified type     6. Testosterone deficiency ([facility]. [facility])          Plan:  Add HCTZ 12.5 mg   Recheck his bmp to determine if HCTZ is appropriate, TSH, T4 already ordered  -rtc in 2-3 weeks for bp  -Continue f/u with endocrine  -cxr to follow up on pleural plaques  -Encourage to take the symbicort    Health Maintenance Due   Topic Date Due    Annual Wellness Visit (AWV)  01/29/2022       Attending Physician Statement  I have discussed the case, including pertinent history and exam findings with the resident. I also have seen the patient and performed key portions of the examination. I agree with the documented assessment and plan as documented by the resident.         Yoni Henao MD 6/7/2022 11:39 AM

## 2022-06-07 NOTE — PROGRESS NOTES
Donny Steiner (:  1935) is a 81 y.o. male,Established patient, here for evaluation of the following chief complaint(s):  Hypertension         ASSESSMENT/PLAN:  1. Essential hypertension  -     hydroCHLOROthiazide (HYDRODIURIL) 12.5 MG tablet; Take 1 tablet by mouth every morning, Disp-30 tablet, R-0Normal  2. Irregular heart beat  3. Hyperlipidemia, unspecified hyperlipidemia type  4. Chronic obstructive pulmonary disease, unspecified COPD type (HCC)  -     XR CHEST STANDARD (2 VW); Future  5. Hypothyroidism, unspecified type  6. Testosterone deficiency (Dr. Alix Suarez- [facility])   7. Calcified pleural plaque on chest x-ray  -     XR CHEST STANDARD (2 VW); Future    HTN urgency noted, 180-200's SBP at home. Continue Sotalol 80 BID  Continue Lostartan 50 QD  Start HCTZ 12.5 QD, GFR > 90. Check Blood pressures at home. Continue Statin. Obtain labs for TSH,T4, BMP CBC as previous ordered. Continue Levothyroxine 75 mcg QD at this time unless TSH labs abnormal as above. F/U with Dr. Alix Suarez at [facility] for Testosterone and thyroid. Encourage use of Symbicort QD. Duonebs PRN. Ordered CXR of lungs for COPD and Pleural Plauques noted on CT [id number] in setting of noted hx of exposure as . Return in about 2 weeks (around 2022) for F/U BP. Subjective   SUBJECTIVE/OBJECTIVE:  HPI   Pt is a 80 y/o male with PMH of HTN, HLD, CAD S/P 1 x stent , Hypothyroidism, COPD, Pleural plaques, Pitu CA, Skin CA presenting for F/U HTN. Pt states asymptomatic episodes of SBP >180 x 3, Had 's 1 day prior to presentation, denies vision issues, light headnesses dizziness weakness. No concern for end organ damage symptoms at this time. Repeat home bp readings ranging 140-180's SBP, notable 142/80 in office. Pt on lostartan 50 and sotalol 80 BID, Pt was previously on water pill for leg swelling, complains of chronic dry cough that has been for years. Pt amicable to starting HCTZ 12.5 QD. Regarding pleural plaques Pt amicable to follow up CXR to evaluate for stability. Pt has standing orders for labs work, follows up with specialists for testosterone and Thyroid. Review of Systems   Constitutional: Negative for activity change, appetite change, chills, diaphoresis, fatigue and fever. HENT: Negative for facial swelling. Respiratory: Negative for cough, shortness of breath, wheezing and stridor. Cardiovascular: Positive for leg swelling (mild). Negative for chest pain and palpitations. Gastrointestinal: Negative for abdominal pain, constipation, diarrhea, nausea and vomiting. Genitourinary: Negative for decreased urine volume, dysuria, flank pain, frequency, hematuria and urgency. Musculoskeletal: Negative for gait problem and myalgias. Skin: Negative for pallor, rash and wound. Neurological: Negative for dizziness, tremors, seizures, syncope, weakness, light-headedness, numbness and headaches. Hematological: Negative for adenopathy. Psychiatric/Behavioral: Negative for behavioral problems, confusion and decreased concentration. Objective   Physical Exam  Constitutional:       General: He is not in acute distress. Appearance: Normal appearance. He is obese. He is not ill-appearing or diaphoretic. HENT:      Head: Normocephalic and atraumatic. Nose: Nose normal. No congestion or rhinorrhea. Mouth/Throat:      Mouth: Mucous membranes are moist.      Pharynx: Oropharynx is clear. No oropharyngeal exudate or posterior oropharyngeal erythema. Eyes:      General: No scleral icterus. Right eye: No discharge. Left eye: No discharge. Extraocular Movements: Extraocular movements intact. Conjunctiva/sclera: Conjunctivae normal.      Pupils: Pupils are equal, round, and reactive to light. Cardiovascular:      Rate and Rhythm: Normal rate. Pulses: Normal pulses. Heart sounds: Normal heart sounds. No murmur heard.   No friction rub. No gallop. Pulmonary:      Effort: Pulmonary effort is normal. No respiratory distress. Breath sounds: Normal breath sounds. No wheezing, rhonchi or rales. Abdominal:      General: Abdomen is flat. Bowel sounds are normal. There is no distension. Palpations: Abdomen is soft. Tenderness: There is no abdominal tenderness. There is no guarding or rebound. Musculoskeletal:         General: No swelling, tenderness or signs of injury. Right lower le+ Edema present. Left lower le+ Edema present. Skin:     General: Skin is warm and dry. Capillary Refill: Capillary refill takes 2 to 3 seconds. Coloration: Skin is not jaundiced or pale. Findings: No erythema, lesion or rash. Neurological:      General: No focal deficit present. Mental Status: He is alert and oriented to person, place, and time. Gait: Gait normal.            On this date 2022 I have spent 40 minutes reviewing previous notes, test results and face to face with the patient discussing the diagnosis and importance of compliance with the treatment plan as well as documenting on the day of the visit. An electronic signature was used to authenticate this note.     --Rick Aranda MD

## 2022-06-07 NOTE — PROGRESS NOTES
[id number] [facility] [id number]  Dept: [phone]  Loc: [phone]      Please see Resident note for complete HPI. ROS per Resident    Lab Results   Component Value Date    WBC 4.6 (L) 04/02/2022    HGB 13.5 (L) 04/02/2022    HCT 42.1 04/02/2022    MCV 94.8 (H) 04/02/2022     04/02/2022     Lab Results   Component Value Date     04/02/2022    K 4.4 04/02/2022     04/02/2022    CO2 28 04/02/2022    BUN 14 04/02/2022    CREATININE 0.8 04/02/2022    GLUCOSE 79 04/02/2022    CALCIUM 9.7 04/02/2022    PROT 6.5 04/02/2022    LABALBU 3.7 04/02/2022    BILITOT 1.0 04/02/2022    ALKPHOS 66 04/02/2022    AST 27 04/02/2022    ALT 22 04/02/2022    LABGLOM >90 04/02/2022    AGRATIO 1.7 05/29/2020     Lab Results   Component Value Date    LABA1C 5.2 04/12/2022     No results found for: EAG  No results found for: LABMICR, [id number]  Lab Results   Component Value Date    TSH 0.027 (L) 04/23/2021    E0XJCDT 142.41 08/06/2014    T4FREE 1.60 04/23/2021     Lab Results   Component Value Date    CHOL 96 04/12/2022    CHOL 115 04/02/2022    CHOL 124 11/29/2021     Lab Results   Component Value Date    TRIG 97 04/12/2022    TRIG 99 04/02/2022    TRIG 98 11/29/2021     Lab Results   Component Value Date    HDL 23 (A) 04/12/2022    HDL 28 04/02/2022    HDL 31 11/29/2021     Lab Results   Component Value Date    LDLCALC 53 04/12/2022    LDLCALC 67 04/02/2022    LDLCALC 73 11/29/2021     Lab Results   Component Value Date    VLDL 24 05/29/2020    VLDL 20 05/13/2019    VLDL 19 10/01/2018     Lab Results   Component Value Date    CHOLHDLRATIO 4.6 03/30/2011     Lab Results   Component Value Date    PSA 0.13 07/13/2015    PSA 0.17 08/06/2014    PSA 0.17 06/17/2013       Health Maintenance Due   Topic Date Due    Annual Wellness Visit (AWV)  01/29/2022         Physical Exam per Resident       ICD-10-CM    1.  Essential hypertension  I10 hydroCHLOROthiazide (HYDRODIURIL) 12.5 MG tablet   2. Irregular heart beat  I49.9    3. Hyperlipidemia, unspecified hyperlipidemia type  E78.5    4. Chronic obstructive pulmonary disease, unspecified COPD type (HCC)  J44.9 XR CHEST STANDARD (2 VW)   5. Hypothyroidism, unspecified type  E03.9    [facility]   E34.9    7. Calcified pleural plaque on chest x-ray  J94.8 XR CHEST STANDARD (2 VW)           Plan  I participated in the discussion and care of this patient   - Given uncontrolled BP today, consider Hyzaar 50-12.5 mg daily. For now, will continue with Losartan 50 and add on HCTZ 12.5mg daily separately as per patient request.   - Continue with blood pressure monitoring at home  - Continue with current dose of Synthroid. Follow-up with Endocrinology as previously scheduled.    - Encouraged patient to have labs done as previously ordered   - Encouraged patient to take inhalers as prescribed  - Consider repeat imaging for lung pleural plaques  - Follow-up with specialists as previously scheduled  - Follow-up in 2-3 weeks for BP recheck

## 2022-06-07 NOTE — RESULT ENCOUNTER NOTE
Chest Radiograph results reviewed. Pleual Plaques/Lung calcifications are stable. No changes since last seen. Otherwise normal findings and unimpressive. Electronically signed by Marquis Ninfa MD on 6/7/2022 at 2:37 PM

## 2022-06-13 ENCOUNTER — TELEPHONE (OUTPATIENT)
Dept: FAMILY MEDICINE CLINIC | Age: 87
End: 2022-06-13

## 2022-06-13 NOTE — TELEPHONE ENCOUNTER
----- Message from Alyson Matthews MD sent at 6/7/2022  2:37 PM EDT -----  Chest Radiograph results reviewed. Pleual Plaques/Lung calcifications are stable. No changes since last seen. Otherwise normal findings and unimpressive. Electronically signed by Alyson Matthews MD on 6/7/2022 at 2:37 PM

## 2022-06-22 ENCOUNTER — OFFICE VISIT (OUTPATIENT)
Dept: FAMILY MEDICINE CLINIC | Age: 87
End: 2022-06-22
Payer: MEDICARE

## 2022-06-22 VITALS
HEART RATE: 66 BPM | RESPIRATION RATE: 16 BRPM | SYSTOLIC BLOOD PRESSURE: 134 MMHG | OXYGEN SATURATION: 99 % | BODY MASS INDEX: 28.79 KG/M2 | HEIGHT: 67 IN | WEIGHT: 183.4 LBS | DIASTOLIC BLOOD PRESSURE: 78 MMHG | TEMPERATURE: 97.3 F

## 2022-06-22 DIAGNOSIS — I10 ESSENTIAL HYPERTENSION: ICD-10-CM

## 2022-06-22 DIAGNOSIS — E78.5 HYPERLIPIDEMIA, UNSPECIFIED HYPERLIPIDEMIA TYPE: Primary | ICD-10-CM

## 2022-06-22 DIAGNOSIS — E03.9 HYPOTHYROIDISM, UNSPECIFIED TYPE: ICD-10-CM

## 2022-06-22 DIAGNOSIS — I25.10 CORONARY ARTERY DISEASE INVOLVING NATIVE CORONARY ARTERY OF NATIVE HEART WITHOUT ANGINA PECTORIS: Chronic | ICD-10-CM

## 2022-06-22 PROCEDURE — 1036F TOBACCO NON-USER: CPT

## 2022-06-22 PROCEDURE — G8417 CALC BMI ABV UP PARAM F/U: HCPCS

## 2022-06-22 PROCEDURE — 1123F ACP DISCUSS/DSCN MKR DOCD: CPT

## 2022-06-22 PROCEDURE — 99213 OFFICE O/P EST LOW 20 MIN: CPT

## 2022-06-22 PROCEDURE — G8427 DOCREV CUR MEDS BY ELIG CLIN: HCPCS

## 2022-06-22 RX ORDER — HYDROCHLOROTHIAZIDE 12.5 MG/1
12.5 TABLET ORAL EVERY MORNING
Qty: 30 TABLET | Refills: 2 | Status: SHIPPED | OUTPATIENT
Start: 2022-06-22 | End: 2022-08-22 | Stop reason: SDUPTHER

## 2022-06-22 ASSESSMENT — ENCOUNTER SYMPTOMS
CONSTIPATION: 0
STRIDOR: 0
NAUSEA: 0
SHORTNESS OF BREATH: 0
DIARRHEA: 0
WHEEZING: 0
VOMITING: 0
ABDOMINAL PAIN: 0
COUGH: 0

## 2022-06-22 NOTE — PATIENT INSTRUCTIONS
Please See Dr. Morgan Mead for Thyroid. Your TSH was 0.024  Your T4 was 1.4  Your thyroid medication may need to be adjusted if Dr. Morgan Mead requires it.

## 2022-06-22 NOTE — PROGRESS NOTES
Darlin Meigs (:  1935) is a 81 y.o. male,Established patient, here for evaluation of the following chief complaint(s):  Follow-up and Hypertension         ASSESSMENT/PLAN:  1. Hyperlipidemia, unspecified hyperlipidemia type  2. Essential hypertension  -     hydroCHLOROthiazide (HYDRODIURIL) 12.5 MG tablet; Take 1 tablet by mouth every morning, Disp-30 tablet, R-2Normal  3. Coronary artery disease involving native coronary artery of native heart without angina pectoris  4. Hypothyroidism, unspecified type      Bp at at home slightly above target. May be 2/2 old machine, [facility]  If not qualified yet, Pt to call in office and this provider will try to order. Shared descision making with pt regarding risk vs benefit of increasing Bp control med  Pt to trial 1 week of increasing Hctz 12.5 x 2 pills, if BP improved at target 130-145's can continue   Else If Pt feels side effects such as dizziness, lightheadedness, syncope, vision problems, polyuria Pt can stop doubling doze of Hctz and go with 12.5 QD only. Continue statin at current dose. Follow up with Endo regarding thyroid labs and need for possible med adjustment vs continue at current dose. Follow up with specialists. Return in about 3 months (around 2022) for 3 mo F/U. Subjective   SUBJECTIVE/OBJECTIVE:  HPI     Pt is a 80 y/o male with PMH of HTN, HLD, CAD S/P 1 x stent , Hypothyroidism, COPD, Pleural plaques, Pitu CA, Skin CA presenting for F/U HTN. Pt start on lisinopril 12.5 since last seen, denies vision issues, light headnesses dizziness weakness, syncope, falls, polyuria. Repeat home bp readings ranging 130-170's SBP, average 150-160's. Notable 132/78 in office. Pt on lostartan 50 QD, HCTZ 12.5 QD, sotalol 40 BID. Pt amicable to trial of HCTZ 25 x 1 week, if side effects noted to stop. May need more updated BP monitor machine as over 11years old.  Follow up with Endocrine for TSH low noted on lab. Review of Systems   Constitutional: Negative for activity change, appetite change, chills, diaphoresis, fatigue and fever. Eyes: Negative for visual disturbance. Respiratory: Negative for cough, shortness of breath, wheezing and stridor. Cardiovascular: Negative for chest pain, palpitations and leg swelling. Gastrointestinal: Negative for abdominal pain, constipation, diarrhea, nausea and vomiting. Endocrine: Negative for cold intolerance and polyuria. Genitourinary: Negative for decreased urine volume, dysuria, flank pain, frequency, hematuria and urgency. Musculoskeletal: Negative for gait problem and myalgias. Skin: Negative for pallor, rash and wound. Neurological: Negative for dizziness, tremors, seizures, syncope, weakness, light-headedness, numbness and headaches. Psychiatric/Behavioral: Negative for behavioral problems, confusion and decreased concentration. Objective   Physical Exam  Constitutional:       General: He is not in acute distress. Appearance: Normal appearance. He is obese. He is not ill-appearing or diaphoretic. HENT:      Head: Normocephalic and atraumatic. Nose: Nose normal. No congestion or rhinorrhea. Mouth/Throat:      Mouth: Mucous membranes are moist.      Pharynx: Oropharynx is clear. No oropharyngeal exudate or posterior oropharyngeal erythema. Eyes:      General: No scleral icterus. Right eye: No discharge. Left eye: No discharge. Extraocular Movements: Extraocular movements intact. Conjunctiva/sclera: Conjunctivae normal.      Pupils: Pupils are equal, round, and reactive to light. Cardiovascular:      Rate and Rhythm: Normal rate and regular rhythm. Pulses: Normal pulses. Heart sounds: Normal heart sounds. No murmur heard. No friction rub. No gallop. Pulmonary:      Effort: Pulmonary effort is normal. No respiratory distress. Breath sounds: Normal breath sounds.  No wheezing, rhonchi or rales. Abdominal:      General: Abdomen is flat. Bowel sounds are normal. There is no distension. Palpations: Abdomen is soft. Tenderness: There is no abdominal tenderness. There is no guarding or rebound. Musculoskeletal:         General: No swelling, tenderness or signs of injury. Right lower leg: No edema. Left lower leg: No edema. Skin:     General: Skin is warm and dry. Capillary Refill: Capillary refill takes 2 to 3 seconds. Coloration: Skin is not jaundiced or pale. Findings: No erythema, lesion or rash. Neurological:      General: No focal deficit present. Mental Status: He is alert and oriented to person, place, and time. Cranial Nerves: No cranial nerve deficit. Motor: No weakness. On this date 6/22/2022 I have spent 25 minutes reviewing previous notes, test results and face to face with the patient discussing the diagnosis and importance of compliance with the treatment plan as well as documenting on the day of the visit. An electronic signature was used to authenticate this note.     --Ferdinand Dance, MD

## 2022-06-24 NOTE — PROGRESS NOTES
Attending Physician Statement  I have discussed the case, including pertinent history and exam findings with the resident. I agree with the documented assessment and plan as documented by the resident.   GE modifier added to this encounter      Dean Cuevas MD 6/24/2022 12:41 PM

## 2022-08-01 ENCOUNTER — OFFICE VISIT (OUTPATIENT)
Dept: FAMILY MEDICINE CLINIC | Age: 87
End: 2022-08-01
Payer: MEDICARE

## 2022-08-01 ENCOUNTER — TELEPHONE (OUTPATIENT)
Dept: FAMILY MEDICINE CLINIC | Age: 87
End: 2022-08-01

## 2022-08-01 VITALS
TEMPERATURE: 97.1 F | HEART RATE: 73 BPM | WEIGHT: 184.2 LBS | OXYGEN SATURATION: 95 % | SYSTOLIC BLOOD PRESSURE: 138 MMHG | DIASTOLIC BLOOD PRESSURE: 86 MMHG | RESPIRATION RATE: 16 BRPM | HEIGHT: 67 IN | BODY MASS INDEX: 28.91 KG/M2

## 2022-08-01 DIAGNOSIS — I10 ESSENTIAL HYPERTENSION: ICD-10-CM

## 2022-08-01 DIAGNOSIS — H81.12 BPPV (BENIGN PAROXYSMAL POSITIONAL VERTIGO), LEFT: Primary | ICD-10-CM

## 2022-08-01 PROCEDURE — G8427 DOCREV CUR MEDS BY ELIG CLIN: HCPCS

## 2022-08-01 PROCEDURE — 1036F TOBACCO NON-USER: CPT

## 2022-08-01 PROCEDURE — 99213 OFFICE O/P EST LOW 20 MIN: CPT

## 2022-08-01 PROCEDURE — G8417 CALC BMI ABV UP PARAM F/U: HCPCS

## 2022-08-01 PROCEDURE — 1123F ACP DISCUSS/DSCN MKR DOCD: CPT

## 2022-08-01 RX ORDER — SOTALOL HYDROCHLORIDE 80 MG/1
TABLET ORAL
COMMUNITY
Start: 2022-06-29

## 2022-08-01 ASSESSMENT — ENCOUNTER SYMPTOMS
CONSTIPATION: 0
DIARRHEA: 0
COUGH: 0
VOMITING: 0
PHOTOPHOBIA: 0
WHEEZING: 0
SHORTNESS OF BREATH: 0
NAUSEA: 0
ABDOMINAL PAIN: 0
STRIDOR: 0

## 2022-08-01 NOTE — PROGRESS NOTES
S: 81 y.o. male with   Chief Complaint   Patient presents with    Dizziness     Started in November also having balance issues and also having some memory issues       HPI: please see resident note for HPI and ROS. BP Readings from Last 3 Encounters:   08/01/22 138/86   06/22/22 134/78   06/07/22 (!) 142/80     Wt Readings from Last 3 Encounters:   08/01/22 184 lb 3.2 oz (83.6 kg)   06/22/22 183 lb 6.4 oz (83.2 kg)   06/07/22 183 lb (83 kg)       O: VS:  height is 5' 7\" (1.702 m) and weight is 184 lb 3.2 oz (83.6 kg). His temporal temperature is 97.1 °F (36.2 °C). His blood pressure is 138/86 and his pulse is 73. His respiration is 16 and oxygen saturation is 95%. Physical exam performed by resident physician. Per resident, patient had positive Gavi-Hallpike maneuver on right causing a left fast phase nystagmus. Epley's maneuver was performed. Diagnosis Orders   1. BPPV (benign paroxysmal positional vertigo), left        2. Essential hypertension            Plan:  Please refer to resident note for full plan. 60-year-old male presents the office for new onset dizziness. Previously patient was having dizziness was thought to be due to blood pressure concerns her blood pressure medication was adjusted but this is not helped. Patient is having left sided fast phase nystagmus of bilateral eyes at rest and with Stambaugh-Hallpike maneuver. Epley maneuver was performed on exam today with some relief. Patient was educated on Epley's maneuvers/training to do at home. We will hold off on playing golf due to exacerbation of symptoms. Plan to follow-up in 2 to 3 weeks for recheck and dizziness. Health Maintenance Due   Topic Date Due    Annual Wellness Visit (AWV)  01/29/2022       Attending Physician Statement  I have discussed the case, including pertinent history and exam findings with the resident. I agree with the documented assessment and plan as documented by the resident.   [address] [facility], DO 8/2/2022 8:19 AM

## 2022-08-01 NOTE — TELEPHONE ENCOUNTER
Patient states he is dizzy all the time . Wife reports BP is 147/77 HR 82 at this time. Worst dizziness is bending over or standing up.  Same day appt made w/

## 2022-08-02 NOTE — PROGRESS NOTES
Valdemar Olivera (:  1935) is a 81 y.o. male,Established patient, here for evaluation of the following chief complaint(s):  Dizziness (Started in November also having balance issues and also having some memory issues)         ASSESSMENT/PLAN:  1. BPPV (benign paroxysmal positional vertigo), left  2. Essential hypertension    Epley manoeuvre x 2 attempted, some partial improvement  Pt to do Epley at home 3 x TID as tolerated,   Unfortunately vestibular rehab is no longer a service available  If not improved consider repeat MRI as last done in 2019. Return in about 3 weeks (around 2022) for F/u Vertigo. Subjective   SUBJECTIVE/OBJECTIVE:  HPI    Pt is a 82 y/o male w/ PMH HTN, HLD, CAD, hypothyroidism, presenting for acute visit for intermittent dizziness consistent with vertigo. Pt states had intermittent vertigo since , noted to be worsened with head turning, feels as if self is spinning. Pt noted to have subtle horizontal nystagmus at rest w/ slow phase to left and fast to right which is improved with fixation. Noted baseline hearing loss but denies tinnitus, Pt plays golf on . Review of Systems   Constitutional:  Negative for activity change, appetite change, chills, diaphoresis, fatigue and fever. HENT:  Positive for hearing loss (baseline). Eyes:  Negative for photophobia and visual disturbance. Respiratory:  Negative for cough, shortness of breath, wheezing and stridor. Cardiovascular:  Negative for chest pain, palpitations and leg swelling. Gastrointestinal:  Negative for abdominal pain, constipation, diarrhea, nausea and vomiting. Genitourinary:  Negative for decreased urine volume, dysuria, flank pain, frequency, hematuria and urgency. Musculoskeletal:  Negative for gait problem and myalgias. Skin:  Negative for pallor, rash and wound. Neurological:  Positive for dizziness.  Negative for tremors, seizures, syncope, facial asymmetry, speech difficulty, weakness, light-headedness, numbness and headaches. Psychiatric/Behavioral:  Negative for behavioral problems, confusion and decreased concentration. Objective     Vitals:    08/01/22 1416   BP: 138/86   Pulse: 73   Resp: 16   Temp: 97.1 °F (36.2 °C)   SpO2: 95%       Physical Exam  Constitutional:       General: He is not in acute distress. Appearance: Normal appearance. He is not ill-appearing or diaphoretic. HENT:      Head: Normocephalic and atraumatic. Nose: Nose normal. No congestion or rhinorrhea. Mouth/Throat:      Mouth: Mucous membranes are moist.      Pharynx: Oropharynx is clear. No oropharyngeal exudate or posterior oropharyngeal erythema. Eyes:      General:         Right eye: No discharge. Left eye: No discharge. Extraocular Movements:      Right eye: Nystagmus (rt beating) present. Left eye: Nystagmus (rt beating) present. Conjunctiva/sclera: Conjunctivae normal.      Pupils: Pupils are equal, round, and reactive to light. Comments: Gavi hallpike localizes to left ear. Cardiovascular:      Rate and Rhythm: Normal rate. Pulses: Normal pulses. Heart sounds: Normal heart sounds. No murmur heard. No friction rub. No gallop. Pulmonary:      Effort: Pulmonary effort is normal. No respiratory distress. Breath sounds: Normal breath sounds. No wheezing, rhonchi or rales. Abdominal:      General: Abdomen is flat. Bowel sounds are normal. There is no distension. Palpations: Abdomen is soft. Tenderness: There is no abdominal tenderness. There is no guarding or rebound. Musculoskeletal:         General: Deformity (noted kyphosis baseline) present. No swelling, tenderness or signs of injury. Right lower leg: No edema. Left lower leg: No edema. Skin:     General: Skin is warm and dry. Capillary Refill: Capillary refill takes 2 to 3 seconds. Coloration: Skin is not jaundiced or pale. Findings: No erythema, lesion or rash. Neurological:      General: No focal deficit present. Mental Status: He is alert and oriented to person, place, and time. Cranial Nerves: No cranial nerve deficit. Motor: No weakness. Gait: Gait normal.          On this date 8/1/2022 I have spent 35 minutes reviewing previous notes, test results and face to face with the patient discussing the diagnosis and importance of compliance with the treatment plan as well as documenting on the day of the visit. An electronic signature was used to authenticate this note.     --Mili Mata MD

## 2022-08-05 ENCOUNTER — TELEPHONE (OUTPATIENT)
Dept: FAMILY MEDICINE CLINIC | Age: 87
End: 2022-08-05

## 2022-08-05 NOTE — TELEPHONE ENCOUNTER
Pt was given Epley Maneuver directions. Pt to work on Left Ear by turning head towards Right Side using Epley maneuvers  Epley manuevers to be described as such:    1) Pt to start sitting in bed  2) turn head 45 degrees towards the left side. 3) Pt to lay down in bed while keeping head tiled at 45 degrees  4) Pt to wait 30-60 seconds in position. 5) Pt to turn head towards the right side at 45 degrees,   6) Pt to wait 45-60 seconds in this new position  7) Pt to turn his entire body onto Pt's right side/hip/arm while keeping his head still at 45 degrees to the right, of which will mean he will end up facing the bed at a 45 degrees with his nose into the bed. 8) Pt to wait 45-60 seconds in this side turned position. 9) to quickly sit up from side with his head returned to neutral position looking straight ahead and he should be facing the wall to the right side of his bed as he sits up with his entire body facing the said wall. 10) Pt to wait 30-60 seconds before repeating from step 1. To be done 3 times, three times per day.     Electronically signed by Grace Garcia MD on 8/5/2022 at 11:41 AM

## 2022-08-09 NOTE — TELEPHONE ENCOUNTER
Pt informed and verbalized understanding. He said he only needed to know how many times he needed to do this and how many times per day.

## 2022-08-11 DIAGNOSIS — I25.10 CORONARY ARTERY DISEASE INVOLVING NATIVE CORONARY ARTERY OF NATIVE HEART WITHOUT ANGINA PECTORIS: Chronic | ICD-10-CM

## 2022-08-11 DIAGNOSIS — I10 ESSENTIAL HYPERTENSION: ICD-10-CM

## 2022-08-15 RX ORDER — LOSARTAN POTASSIUM 50 MG/1
TABLET ORAL
Qty: 90 TABLET | Refills: 0 | Status: SHIPPED | OUTPATIENT
Start: 2022-08-15

## 2022-08-15 NOTE — TELEPHONE ENCOUNTER
Patient's last appointment was : 4/18/2022  Patient's next appointment is : 8/22/22  Future Appointments   Date Time Provider [place]oussard   8/22/2022  2:20 PM Ciarra Mackey MD [facility] [place]   9/26/2022  8:00 AM MD QUYNH Shetty  [facility] [place]     Last refilled:8/16/21    Lab Results   Component Value Date    LABA1C 5.2 04/12/2022     Lab Results   Component Value Date    CHOL 96 04/12/2022    TRIG 97 04/12/2022    HDL 23 (A) 04/12/2022    LDLCALC 53 04/12/2022    LDLDIRECT 60 05/29/2020     Lab Results   Component Value Date     06/07/2022    K 4.5 06/07/2022     06/07/2022    CO2 29 06/07/2022    BUN 13 06/07/2022    CREATININE 0.7 06/07/2022    GLUCOSE 74 06/07/2022    CALCIUM 9.3 06/07/2022    PROT 6.5 06/07/2022    LABALBU 4.0 06/07/2022    BILITOT 1.0 06/07/2022    ALKPHOS 67 06/07/2022    AST 22 06/07/2022    ALT 19 06/07/2022    LABGLOM >90 06/07/2022    AGRATIO 1.7 05/29/2020     Lab Results   Component Value Date    TSH 0.024 (L) 06/07/2022    T9AKIFO 142.41 08/06/2014    T4FREE 1.44 06/07/2022     Lab Results   Component Value Date    WBC 4.9 06/07/2022    HGB 13.4 (L) 06/07/2022    HCT 42.8 06/07/2022    MCV 95.3 (H) 06/07/2022     06/07/2022

## 2022-08-22 ENCOUNTER — OFFICE VISIT (OUTPATIENT)
Dept: FAMILY MEDICINE CLINIC | Age: 87
End: 2022-08-22
Payer: MEDICARE

## 2022-08-22 VITALS
HEART RATE: 66 BPM | OXYGEN SATURATION: 94 % | HEIGHT: 67 IN | TEMPERATURE: 97.1 F | WEIGHT: 186.2 LBS | BODY MASS INDEX: 29.22 KG/M2 | SYSTOLIC BLOOD PRESSURE: 138 MMHG | DIASTOLIC BLOOD PRESSURE: 82 MMHG | RESPIRATION RATE: 16 BRPM

## 2022-08-22 DIAGNOSIS — H81.12 BPPV (BENIGN PAROXYSMAL POSITIONAL VERTIGO), LEFT: Primary | ICD-10-CM

## 2022-08-22 DIAGNOSIS — M47.815 OSTEOARTHRITIS OF THORACOLUMBAR SPINE, UNSPECIFIED SPINAL OSTEOARTHRITIS COMPLICATION STATUS: ICD-10-CM

## 2022-08-22 DIAGNOSIS — I10 ESSENTIAL HYPERTENSION: ICD-10-CM

## 2022-08-22 DIAGNOSIS — Z91.81 AT HIGH RISK FOR FALLS: ICD-10-CM

## 2022-08-22 PROCEDURE — 1036F TOBACCO NON-USER: CPT

## 2022-08-22 PROCEDURE — G8427 DOCREV CUR MEDS BY ELIG CLIN: HCPCS

## 2022-08-22 PROCEDURE — 1123F ACP DISCUSS/DSCN MKR DOCD: CPT

## 2022-08-22 PROCEDURE — 99213 OFFICE O/P EST LOW 20 MIN: CPT

## 2022-08-22 PROCEDURE — G8417 CALC BMI ABV UP PARAM F/U: HCPCS

## 2022-08-22 RX ORDER — HYDROCHLOROTHIAZIDE 12.5 MG/1
12.5 TABLET ORAL EVERY MORNING
Qty: 90 TABLET | Refills: 2 | Status: SHIPPED | OUTPATIENT
Start: 2022-08-22

## 2022-08-22 NOTE — PROGRESS NOTES
1 to 2 months for Medicare annual wellness. Health Maintenance Due   Topic Date Due    Annual Wellness Visit (AWV)  01/29/2022       Attending Physician Statement  I have discussed the case, including pertinent history and exam findings with the resident. I agree with the documented assessment and plan as documented by the resident.   RICH Whitmore DO 8/23/2022 7:50 AM

## 2022-08-22 NOTE — PROGRESS NOTES
Mia Navarro (:  1935) is a 81 y.o. male,Established patient, here for evaluation of the following chief complaint(s):  Follow-up (3 Week Follow-up Vertigo)         ASSESSMENT/PLAN:  1. BPPV (benign paroxysmal positional vertigo), left  -     DME Order for Walker as OP  2. At high risk for falls  -     DME Order for Shalinijaimie Taylore as OP  3. Osteoarthritis of thoracolumbar spine, unspecified spinal osteoarthritis complication status  -     DME Order for Walker as OP  4. Essential hypertension  -     hydroCHLOROthiazide (HYDRODIURIL) 12.5 MG tablet; Take 1 tablet by mouth every morning, Disp-90 tablet, R-2Normal    Vertigo improving per Pt, no longer has saccades on clinical exam  Continue Epley as tolerated. DME order for walker 2/2 high fall risk and Pt vertigo  Bp controlled refilled HCTZ, Losartan previously filled. Return in about 2 months (around 10/22/2022) for Medicare Anuual Wellness Visit. Subjective   SUBJECTIVE/OBJECTIVE:  HPI  Pt is a 80 y/o male w/ PMH HTN, HLD, CAD, hypothyroidism, presenting for f/u  BPPV, Pt states vertigo is improving since doing epleys, doing 3 sets x 3 times a day and tolerating well. Pt has played golf, reports no dizziness with swings. No longer exhibiting horizontal nystagmus at rest.    Mia Navarro was evaluated today and a DME order was entered for a wheeled walker with seat because he requires this to successfully complete daily living tasks of personal cares, ambulating, grooming, hygiene, dressing upper body, dressing lower body, meal preparation, and taking own medications. A wheeled walker with seat is necessary due to the patient's unsteady gait, upper body weakness, inability to  and ambulation device, ambulating only short distances by pushing a walker, and the need to sit for a short time before resuming ambulation. These tasks cannot be completed with a lesser ambulation device such as a cane, crutch, or standard walker.   The need for this equipment was discussed with the patient and he understands and is in agreement. Review of Systems   Review of Systems   Constitutional:  Negative for activity change, appetite change, chills, diaphoresis, fatigue and fever. HENT:  Positive for hearing loss (baseline). Eyes:  Negative for photophobia and visual disturbance. Respiratory:  Negative for cough, shortness of breath, wheezing and stridor. Cardiovascular:  Negative for chest pain, palpitations and leg swelling. Gastrointestinal:  Negative for abdominal pain, constipation, diarrhea, nausea and vomiting. Genitourinary:  Negative for decreased urine volume, dysuria, flank pain, frequency, hematuria and urgency. Musculoskeletal:  Negative for gait problem and myalgias. Skin:  Negative for pallor, rash and wound. Neurological:  dizziness improving. Negative for tremors, seizures, syncope, facial asymmetry, speech difficulty, weakness, light-headedness, numbness and headaches. Psychiatric/Behavioral:  Negative for behavioral problems, confusion and decreased concentration. Objective     Vitals:    08/22/22 1422   BP: 138/82   Pulse: 66   Resp: 16   Temp: 97.1 °F (36.2 °C)   SpO2: 94%       Physical Exam   Constitutional:       General: He is not in acute distress. Appearance: Normal appearance. He is not ill-appearing or diaphoretic. HENT:      Head: Normocephalic and atraumatic. Nose: Nose normal. No congestion or rhinorrhea. Mouth/Throat:      Mouth: Mucous membranes are moist.      Pharynx: Oropharynx is clear. No oropharyngeal exudate or posterior oropharyngeal erythema. Eyes:      General:         Right eye: No discharge. Left eye: No discharge. Extraocular Movements:      Right eye: negative for nystagmus     Left eye: negative for nystagmus     Conjunctiva/sclera: Conjunctivae normal.      Pupils: Pupils are equal, round, and reactive to light.    Cardiovascular:      Rate and Rhythm: Normal rate. Pulses: Normal pulses. Heart sounds: Normal heart sounds. No murmur heard. No friction rub. No gallop. Pulmonary:      Effort: Pulmonary effort is normal. No respiratory distress. Breath sounds: Normal breath sounds. No wheezing, rhonchi or rales. Abdominal:      General: Abdomen is flat. Bowel sounds are normal. There is no distension. Palpations: Abdomen is soft. Tenderness: There is no abdominal tenderness. There is no guarding or rebound. Musculoskeletal:         General: Deformity (noted kyphosis baseline) present. No swelling, tenderness or signs of injury. Right lower leg: No edema. Left lower leg: No edema. Skin:     General: Skin is warm and dry. Capillary Refill: Capillary refill takes 2 to 3 seconds. Coloration: Skin is not jaundiced or pale. Findings: No erythema, lesion or rash. Neurological:      General: No focal deficit present. Mental Status: He is alert and oriented to person, place, and time. Cranial Nerves: No cranial nerve deficit. Motor: No weakness. Gait: Gait normal.     On this date 8/22/2022 I have spent 25 minutes reviewing previous notes, test results and face to face with the patient discussing the diagnosis and importance of compliance with the treatment plan as well as documenting on the day of the visit. An electronic signature was used to authenticate this note.     --Grace Villagomez MD

## 2022-09-12 ENCOUNTER — OFFICE VISIT (OUTPATIENT)
Dept: FAMILY MEDICINE CLINIC | Age: 87
End: 2022-09-12
Payer: MEDICARE

## 2022-09-12 VITALS
DIASTOLIC BLOOD PRESSURE: 98 MMHG | OXYGEN SATURATION: 94 % | RESPIRATION RATE: 14 BRPM | SYSTOLIC BLOOD PRESSURE: 140 MMHG | WEIGHT: 184.2 LBS | HEART RATE: 88 BPM | TEMPERATURE: 96.8 F | HEIGHT: 67 IN | BODY MASS INDEX: 28.91 KG/M2

## 2022-09-12 DIAGNOSIS — H61.23 BILATERAL IMPACTED CERUMEN: Primary | ICD-10-CM

## 2022-09-12 DIAGNOSIS — E86.0 DEHYDRATION, MILD: ICD-10-CM

## 2022-09-12 DIAGNOSIS — R42 ORTHOSTATIC LIGHTHEADEDNESS: ICD-10-CM

## 2022-09-12 PROCEDURE — 69209 REMOVE IMPACTED EAR WAX UNI: CPT

## 2022-09-12 PROCEDURE — 99213 OFFICE O/P EST LOW 20 MIN: CPT

## 2022-09-12 PROCEDURE — G8417 CALC BMI ABV UP PARAM F/U: HCPCS

## 2022-09-12 PROCEDURE — 1036F TOBACCO NON-USER: CPT

## 2022-09-12 PROCEDURE — 1123F ACP DISCUSS/DSCN MKR DOCD: CPT

## 2022-09-12 PROCEDURE — G8427 DOCREV CUR MEDS BY ELIG CLIN: HCPCS

## 2022-09-12 ASSESSMENT — ENCOUNTER SYMPTOMS
WHEEZING: 0
TROUBLE SWALLOWING: 0
SORE THROAT: 0
NAUSEA: 0
VOMITING: 0
VOICE CHANGE: 0
ABDOMINAL PAIN: 0
CONSTIPATION: 0
SHORTNESS OF BREATH: 0
COUGH: 0
STRIDOR: 0
DIARRHEA: 0

## 2022-09-12 NOTE — PROGRESS NOTES
[id number] [facility] [address] [id number]  Dept: [phone]  Loc: [phone]    Here for follow-up of BPPV. BPPV improved post-Epley but having some lightheadedness. Lightheadedness:  no changes to medications. No association with position or activity. Drinking 40-50oz of liquids daily, maybe 32 oz of water. Bilateral cerumen impaction:  Uses hearing aids. will treat today. Please see Resident note for complete HPI.      ROS per Resident    Lab Results   Component Value Date    WBC 4.9 06/07/2022    HGB 13.4 (L) 06/07/2022    HCT 42.8 06/07/2022    MCV 95.3 (H) 06/07/2022     06/07/2022     Lab Results   Component Value Date     06/07/2022    K 4.5 06/07/2022     06/07/2022    CO2 29 06/07/2022    BUN 13 06/07/2022    CREATININE 0.7 06/07/2022    GLUCOSE 74 06/07/2022    CALCIUM 9.3 06/07/2022    PROT 6.5 06/07/2022    LABALBU 4.0 06/07/2022    BILITOT 1.0 06/07/2022    ALKPHOS 67 06/07/2022    AST 22 06/07/2022    ALT 19 06/07/2022    LABGLOM >90 06/07/2022    AGRATIO 1.7 05/29/2020     Lab Results   Component Value Date    LABA1C 5.2 04/12/2022     No results found for: EAG  No results found for: LABMICR, CMOD22FZE  Lab Results   Component Value Date    TSH 0.024 (L) 06/07/2022    I7FOOJN 142.41 08/06/2014    T4FREE 1.44 06/07/2022     Lab Results   Component Value Date    CHOL 96 04/12/2022    CHOL 115 04/02/2022    CHOL 124 11/29/2021     Lab Results   Component Value Date    TRIG 97 04/12/2022    TRIG 99 04/02/2022    TRIG 98 11/29/2021     Lab Results   Component Value Date    HDL 23 (A) 04/12/2022    HDL 28 04/02/2022    HDL 31 11/29/2021     Lab Results   Component Value Date    LDLCALC 53 04/12/2022    LDLCALC 67 04/02/2022    LDLCALC 73 11/29/2021     Lab Results   Component Value Date    VLDL 24 05/29/2020    VLDL 20 05/13/2019    VLDL 19 10/01/2018     Lab Results   Component Value Date    CHOLHDLRATIO 4.6 03/30/2011     Lab Results   Component Value Date    PSA 0.13 07/13/2015    PSA 0.17 08/06/2014    PSA 0.17 06/17/2013       Health Maintenance Due   Topic Date Due    Annual Wellness Visit (AWV)  01/29/2022    Flu vaccine (1) 09/01/2022         Physical Exam per Resident       ICD-10-CM    1. Bilateral impacted cerumen  H61.23       2. Orthostatic lightheadedness  R42       3. Dehydration, mild  E86.0               Plan  I participated in the discussion and care of this patient   Encouraged adequate fluid intake throughout the day. Discussed symptoms of orthostatic hypotension. Cerumen disimpaction.

## 2022-09-12 NOTE — PROGRESS NOTES
S: 81 y.o. male with   Chief Complaint   Patient presents with    Follow-up     Pt presents as a f/u. He claims to have nausea and light headed. He said it started Thursday morning. He claims it is effecting his balance. HPI: please see resident note for HPI and ROS. BP Readings from Last 3 Encounters:   09/12/22 (!) 140/98   08/22/22 138/82   08/01/22 138/86     Wt Readings from Last 3 Encounters:   09/12/22 184 lb 3.2 oz (83.6 kg)   08/22/22 186 lb 3.2 oz (84.5 kg)   08/01/22 184 lb 3.2 oz (83.6 kg)       O: VS:  height is 5' 7\" (1.702 m) and weight is 184 lb 3.2 oz (83.6 kg). His temperature is 96.8 °F (36 °C). His blood pressure is 140/98 (abnormal) and his pulse is 88. His respiration is 14 and oxygen saturation is 94%. Physical exam performed by resident physician     Diagnosis Orders   1. Bilateral impacted cerumen        2. Orthostatic lightheadedness        3. Dehydration, mild            Plan:  Please refer to resident note for full plan. 50-year-old male presents the office for lightheadedness. Lightheadedness: New onset over the past 1 to 2 weeks. Patient is currently drinking approximately 40-50 ounces of fluids per day which includes water, coffee, soda pop. Patient was educated that symptoms are most likely secondary to dehydration especially with history of diuretic use. Encouraged to drink increased water throughout the day and monitor for symptoms of orthostasis with getting up. No need for more additional work-up at this time. No red flag symptoms. Plan to follow-up if symptoms do not improve. Cerumen removal bilateral with ear irrigation. Health Maintenance Due   Topic Date Due    Annual Wellness Visit (AWV)  01/29/2022    Flu vaccine (1) 09/01/2022       Attending Physician Statement  I have discussed the case, including pertinent history and exam findings with the resident. I agree with the documented assessment and plan as documented by the resident. GE      Mally Aggarwal DO 9/13/2022 8:50 AM

## 2022-09-12 NOTE — PROGRESS NOTES
Removed moderate amount of cerumen removed from bilateral ears.  Patient tolerated well and is feeling much better after removal.

## 2022-09-12 NOTE — PROGRESS NOTES
Haroldo Cordova (:  1935) is a 81 y.o. male,Established patient, here for evaluation of the following chief complaint(s):  Follow-up (Pt presents as a f/u. He claims to have nausea and light headed. He said it started Thursday morning. He claims it is effecting his balance.)         ASSESSMENT/PLAN:  1. Bilateral impacted cerumen  2. Orthostatic lightheadedness  3. Dehydration, mild    Pt noted persistent light headness, low suspect for cardiac causes  Likely 2/2 volume depletion 2/2 inadequate hydration  50 oz of fluid daily as opposed to need for 85 fl oz. Pt encouraged to increase hydration ad this time and call in 5 days to see if symptoms improving with adequate hydration. Continue HCTZ at this time. Tolerated Ear wash well. Keep follow up Point for AMW visit. Return in about 4 weeks (around 10/10/2022), or if symptoms worsen or fail to improve. Subjective   SUBJECTIVE/OBJECTIVE:  HPI  Pt is a 80 y/o male w/ PMH HTN, HLD, CAD, hypothyroidism, presenting for Same day appointment for 1-2 weeks of light headedness noted with upright posture,  w/ 24 hr of nausea which has since resolved at time of visit. Pt states vertigo is improving since doing epleys,. Currently denies vertigo. BP controlled, of note Pt noted to be only taking 50 fl oz of hydration per day, which is insufficient especially given Pt is on HCTZ at this time, Pt   on appropriate hydration on 1 oz per Kg, to increase Hydration intake. Additionally Pt noted cerumen impaction on exam, amicable to ear wash today. Review of Systems   Constitutional:  Negative for activity change, appetite change, chills, diaphoresis, fatigue and fever. HENT:  Positive for hearing loss. Negative for sore throat, trouble swallowing and voice change. Respiratory:  Negative for cough, shortness of breath, wheezing and stridor. Cardiovascular:  Negative for chest pain, palpitations and leg swelling.    Gastrointestinal:  Negative for abdominal pain, constipation, diarrhea, nausea and vomiting. Genitourinary:  Negative for decreased urine volume, dysuria, flank pain, frequency, hematuria and urgency. Musculoskeletal:  Negative for gait problem and myalgias. Skin:  Negative for pallor, rash and wound. Neurological:  Positive for light-headedness. Negative for dizziness, tremors, seizures, syncope, weakness, numbness and headaches. Psychiatric/Behavioral:  Negative for behavioral problems, confusion, decreased concentration, self-injury and sleep disturbance. Objective   Vitals:    09/12/22 1428   BP: (!) 140/98   Pulse: 88   Resp: 14   Temp: 96.8 F   SpO2: 94%       Physical Exam  Constitutional:       General: He is not in acute distress. Appearance: Normal appearance. He is obese. He is not ill-appearing or diaphoretic. HENT:      Right Ear: Tympanic membrane and ear canal normal. There is impacted cerumen. Left Ear: Tympanic membrane and ear canal normal. There is impacted cerumen. Nose: Nose normal. No congestion or rhinorrhea. Mouth/Throat:      Mouth: Mucous membranes are moist.      Pharynx: Oropharynx is clear. No oropharyngeal exudate or posterior oropharyngeal erythema. Eyes:      General: No scleral icterus. Right eye: No discharge. Left eye: No discharge. Extraocular Movements: Extraocular movements intact. Conjunctiva/sclera: Conjunctivae normal.      Pupils: Pupils are equal, round, and reactive to light. Cardiovascular:      Rate and Rhythm: Normal rate and regular rhythm. Pulses: Normal pulses. Heart sounds: Normal heart sounds. No murmur heard. No friction rub. No gallop. Pulmonary:      Effort: Pulmonary effort is normal. No respiratory distress. Breath sounds: Normal breath sounds. No wheezing, rhonchi or rales. Abdominal:      General: Abdomen is flat. Bowel sounds are normal. There is no distension. Palpations: Abdomen is soft. Tenderness: There is no abdominal tenderness. There is no right CVA tenderness or left CVA tenderness. Musculoskeletal:         General: No swelling. Cervical back: No rigidity or tenderness. Right lower leg: No edema. Left lower leg: No edema. Skin:     General: Skin is warm and dry. Capillary Refill: Capillary refill takes 2 to 3 seconds. Coloration: Skin is not jaundiced or pale. Findings: No erythema, lesion or rash. Neurological:      General: No focal deficit present. Mental Status: He is alert and oriented to person, place, and time. Cranial Nerves: No cranial nerve deficit. Motor: No weakness. Gait: Gait normal.   Psychiatric:         Mood and Affect: Mood normal.         Behavior: Behavior normal.          On this date 9/12/2022 I have spent 40 minutes reviewing previous notes, test results and face to face with the patient discussing the diagnosis and importance of compliance with the treatment plan as well as documenting on the day of the visit. An electronic signature was used to authenticate this note.     --Yennifer Sullivan MD

## 2022-09-15 ENCOUNTER — TELEPHONE (OUTPATIENT)
Dept: FAMILY MEDICINE CLINIC | Age: 87
End: 2022-09-15

## 2022-09-15 NOTE — TELEPHONE ENCOUNTER
Dr. Brian Padron,   Documentation in note from 08/22/2022 needs to state that patient needs the walker for another task other than just ambulating such as toileting, eating, or bathing. Please Addend office note.

## 2022-10-03 ENCOUNTER — OFFICE VISIT (OUTPATIENT)
Dept: FAMILY MEDICINE CLINIC | Age: 87
End: 2022-10-03
Payer: MEDICARE

## 2022-10-03 VITALS
DIASTOLIC BLOOD PRESSURE: 82 MMHG | TEMPERATURE: 97.5 F | RESPIRATION RATE: 16 BRPM | HEIGHT: 67 IN | SYSTOLIC BLOOD PRESSURE: 130 MMHG | WEIGHT: 189 LBS | OXYGEN SATURATION: 97 % | BODY MASS INDEX: 29.66 KG/M2 | HEART RATE: 58 BPM

## 2022-10-03 DIAGNOSIS — E03.9 HYPOTHYROIDISM, UNSPECIFIED TYPE: ICD-10-CM

## 2022-10-03 DIAGNOSIS — H81.12 BPPV (BENIGN PAROXYSMAL POSITIONAL VERTIGO), LEFT: ICD-10-CM

## 2022-10-03 DIAGNOSIS — Z00.00 MEDICARE ANNUAL WELLNESS VISIT, SUBSEQUENT: Primary | ICD-10-CM

## 2022-10-03 DIAGNOSIS — R42 ORTHOSTATIC LIGHTHEADEDNESS: ICD-10-CM

## 2022-10-03 DIAGNOSIS — I10 ESSENTIAL HYPERTENSION: ICD-10-CM

## 2022-10-03 PROCEDURE — G8484 FLU IMMUNIZE NO ADMIN: HCPCS

## 2022-10-03 PROCEDURE — G0439 PPPS, SUBSEQ VISIT: HCPCS

## 2022-10-03 PROCEDURE — 1123F ACP DISCUSS/DSCN MKR DOCD: CPT

## 2022-10-03 ASSESSMENT — PATIENT HEALTH QUESTIONNAIRE - PHQ9
1. LITTLE INTEREST OR PLEASURE IN DOING THINGS: 0
SUM OF ALL RESPONSES TO PHQ QUESTIONS 1-9: 0
SUM OF ALL RESPONSES TO PHQ9 QUESTIONS 1 & 2: 0
2. FEELING DOWN, DEPRESSED OR HOPELESS: 0
SUM OF ALL RESPONSES TO PHQ QUESTIONS 1-9: 0

## 2022-10-03 ASSESSMENT — LIFESTYLE VARIABLES
HOW OFTEN DO YOU HAVE A DRINK CONTAINING ALCOHOL: MONTHLY OR LESS
HOW MANY STANDARD DRINKS CONTAINING ALCOHOL DO YOU HAVE ON A TYPICAL DAY: 1 OR 2

## 2022-10-03 NOTE — PATIENT INSTRUCTIONS
Thank you   Thank you for trusting us with your healthcare needs. You may receive a survey regarding today's visit. It would help us out if you would take a few moments to provide your feedback. We value your input. Please bring in ALL medications BOTTLES, including inhalers, herbal supplements, over the counter, prescribed & non-prescribed medicine. The office would like actual medication bottles and a list.   Please note our OFFICE POLICIES:   Prior to getting your labs drawn, please check with your insurance company for benefits and eligibility of lab services. Often, insurance companies cover certain tests for preventative visits only. It is patient's responsibility to see what is covered. We are unable to change a diagnosis after the test has been performed. Lab orders will not be re-printed. Please hold onto your original lab orders and take them to your lab to be completed. If you no show your scheduled appointment three times, you will be dismissed from this practice. Reschedules must be completed 24 hours prior to your schedule appointment. If the list below has been completed, PLEASE FAX RECORDS TO OUR OFFICE @ [phone]. Once the records have been received we will update your records at our office: There are no preventive care reminders to display for this patient. Personalized Preventive Plan for Kayla Brown - 10/3/2022  Medicare offers a range of preventive health benefits. Some of the tests and screenings are paid in full while other may be subject to a deductible, co-insurance, and/or copay. Some of these benefits include a comprehensive review of your medical history including lifestyle, illnesses that may run in your family, and various assessments and screenings as appropriate. After reviewing your medical record and screening and assessments performed today your provider may have ordered immunizations, labs, imaging, and/or referrals for you.   A list of these orders (if applicable) as well as your Preventive Care list are included within your After Visit Summary for your review. Other Preventive Recommendations:    A preventive eye exam performed by an eye specialist is recommended every 1-2 years to screen for glaucoma; cataracts, macular degeneration, and other eye disorders. A preventive dental visit is recommended every 6 months. Try to get at least 150 minutes of exercise per week or 10,000 steps per day on a pedometer . Order or download the FREE \"Exercise & Physical Activity: Your Everyday Guide\" from [employer]. Call 2-324.220.4562 or search [employer] online. You need 1724-7248 mg of calcium and 7639-7736 IU of vitamin D per day. It is possible to meet your calcium requirement with diet alone, but a vitamin D supplement is usually necessary to meet this goal.  When exposed to the sun, use a sunscreen that protects against both UVA and UVB radiation with an SPF of 30 or greater. Reapply every 2 to 3 hours or after sweating, drying off with a towel, or swimming. Always wear a seat belt when traveling in a car. Always wear a helmet when riding a bicycle or motorcycle.

## 2022-10-03 NOTE — PROGRESS NOTES
[address] [address] [address] [phone]  Dept: [phone]  Dept Fax: [phone]    QUE Berger is a 81 y. o.male    Chief Complaint   Patient presents with    Medicare AWV       Chief complaint, [place], and all pertinent details of the case reviewed with the resident. Please see resident's note for specific details discussed at today's visit.     Patient Active Problem List   Diagnosis    Essential hypertension    Irregular heart beat    Hypothyroidism (Dr. Thalia Dior)    Hyperlipidemia    Testosterone deficiency ([facility] Thalia [facility])     Sleep apnea    Coronary artery disease involving native coronary artery of native heart without angina pectoris- (Dr. Rhona Toro)    Gastroesophageal reflux disease    Osteoarthritis of thoracolumbar spine    COPD (chronic obstructive pulmonary disease) ([facility])    Osteopenia    BPPV (benign paroxysmal positional vertigo), left       Current Outpatient Medications   Medication Sig Dispense Refill    hydroCHLOROthiazide (HYDRODIURIL) 12.5 MG tablet Take 1 tablet by mouth every morning 90 tablet 2    losartan (COZAAR) 50 MG tablet Take 1 tablet by mouth once daily 90 tablet 0    sotalol (BETAPACE) 80 MG tablet TAKE 1/2 (ONE-HALF) TABLET BY MOUTH EVERY 12 HOURS      Ascorbic Acid (VITAMIN C) 1000 MG tablet Take 1,000 mg by mouth daily      CALCIUM PO Take by mouth      Denosumab (PROLIA SC) Inject into the skin Every 6 months      ciclopirox (PENLAC) 8 % solution       naproxen (NAPROSYN) 500 MG tablet Take 1 tablet by mouth daily 90 tablet 3    Cholecalciferol (VITAMIN D3) 2000 units CAPS Take 1 capsule by mouth daily      ipratropium-albuterol (DUONEB) 0.5-2.5 (3) MG/3ML SOLN nebulizer solution Inhale 1 vial into the lungs every 4 hours as needed       budesonide-formoterol (SYMBICORT) 160-4.5 MCG/ACT AERO Inhale 2 puffs into the lungs 2 times daily 1 Inhaler 3    vitamin B-12 (CYANOCOBALAMIN) 1000 MCG tablet Take 06/07/2022    CALCIUM 9.3 06/07/2022    PROT 6.5 06/07/2022    LABALBU 4.0 06/07/2022    BILITOT 1.0 06/07/2022    ALKPHOS 67 06/07/2022    AST 22 06/07/2022    ALT 19 06/07/2022    LABGLOM >90 06/07/2022    AGRATIO 1.7 05/29/2020     estimated creatinine clearance is 81 mL/min (based on SCr of 0.7 mg/dL).     No results found for: Ann Ape    Lab Results   Component Value Date    TSH 0.024 (L) 06/07/2022    H7WCTAO 142.41 08/06/2014    T4FREE 1.44 06/07/2022       Lab Results   Component Value Date    WBC 4.9 06/07/2022    HGB 13.4 (L) 06/07/2022    HCT 42.8 06/07/2022    MCV 95.3 (H) 06/07/2022     06/07/2022       Lab Results   Component Value Date    PSA 0.13 07/13/2015    PSA 0.17 08/06/2014    PSA 0.17 06/17/2013       Immunization History   Administered Date(s) Administered    COVID-19, MODERNA BLUE border, Primary or Immunocompromised, (age 12y+), IM, 100 mcg/0.5mL 01/19/2021, 02/18/2021, 10/25/2021, 05/20/2022    COVID-19, MODERNA Bivalent BOOSTER, (age 18y+), IM, 50 mcg/0.5 mL 10/03/2022    Influenza 08/26/2015    Influenza Virus Vaccine 11/01/2011, 10/01/2012, 10/01/2013, 09/23/2014, 10/01/2015, 10/11/2017, 10/03/2022    Influenza Whole 10/01/2010    Influenza, AFLURIA (age 1 yrs+), FLUZONE, (age 10 mo+), MDV, 0.5mL 10/13/2016    Influenza, High Dose (Fluzone 65 yrs and older) 10/09/2018, 10/15/2020, 10/01/2021    Pneumococcal Conjugate 13-valent ([id number]) 05/18/2015    Pneumococcal Polysaccharide ([id number]) 10/01/2005    Td, unspecified formulation 07/20/2012    Tdap (Boostrix, Adacel) 10/13/2016, 04/19/2017    Zoster Live (Zostavax) 04/30/2012    Zoster Recombinant (Shingrix) 10/28/2019, 05/07/2020       Health Maintenance   Topic Date Due    Lipids  04/12/2023    Depression Screen  04/18/2023    Annual Wellness Visit (AWV)  10/04/2023    DTaP/Tdap/Td vaccine (3 - Td or Tdap) 04/19/2027    Flu vaccine  Completed    Shingles vaccine  Completed    Pneumococcal 65+ years Vaccine Completed    COVID-19 Vaccine  Completed    Hepatitis A vaccine  Aged Out    Hepatitis B vaccine  Aged Out    Hib vaccine  Aged Out    Meningococcal (ACWY) vaccine  Aged Out       Future Appointments   Date Time Provider [place]   11/2/2023 10:00 AM [facility] [address] [facility]       ASSESSMENT       Diagnosis Orders   1. Medicare annual wellness visit, subsequent  CBC with Auto Differential    Comprehensive Metabolic Panel, Fasting    Magnesium    Lipid, Fasting      2. BPPV (benign paroxysmal positional vertigo), left        3. Essential hypertension  Lipid, Fasting      4. Orthostatic lightheadedness  CBC with Auto Differential    Comprehensive Metabolic Panel, Fasting    Magnesium      5. Hypothyroidism, unspecified type            PLAN      After discussion with pt and resident provider, we agreed on plan as follows: [facility] Check FLP, CBC, CMP, and MAG after 4/12/2023  Continue current meds otherwise. Follow-up in mid 4/2023        Preventive Health Topics:  COLONOSCOPY done 10/17/2014 per Dr. Loreto Conner- no further mandated. (updated 10/3/2022)  DEXA management per [facility]- 9/2020-  ?? Results- nothing needed at this time. On  Prolia. (updated 10/3/2022)  PSA remains on hold at this time due to age and fact that PSA has always been very low in past. (Updated 10/3/2022)  [address] [facility]- 8/12/2021- to follow up \"soon\" (updated 10/3/2022)            Attending Physician Statement  I have discussed the case, including pertinent history and exam findings with the resident. I agree with the documented assessment and plan as documented by the resident.   GE modifier added  to this encounter     Electronically signed by Artis Carrasco MD on 10/3/2022 at 6:07 PM

## 2022-10-03 NOTE — PROGRESS NOTES
Health Maintenance Due   Topic Date Due    Annual Wellness Visit (AWV)  01/29/2022 Completed 10/03/2022

## 2022-10-03 NOTE — PROGRESS NOTES
Medicare Annual Wellness Visit    Vero Walls is here for Medicare AWV    Assessment & Plan   Medicare annual wellness visit, subsequent  -     CBC with Auto Differential; Future  -     Comprehensive Metabolic Panel, Fasting; Future  -     Magnesium; Future  -     Lipid, Fasting; Future  BPPV (benign paroxysmal positional vertigo), left  Essential hypertension  -     Lipid, Fasting; Future  Orthostatic lightheadedness  -     CBC with Auto Differential; Future  -     Comprehensive Metabolic Panel, Fasting; Future  -     Magnesium; Future  Hypothyroidism, unspecified type        Follow up labs in Apr/2023  CBC, CMP, Mg, Lipids prior to next visit. Doing well, wheeled walker was obtained  Dizziness and lightheadness improved  Will continue to improve fluid intake. Continue current medications as is. [facility]  Pending upcoming dental exam and eye exam.      Recommendations for Preventive Services Due: see orders and patient instructions/AVS.  Recommended screening schedule for the next 5-10 years is provided to the patient in written form: see Patient Instructions/AVS.     Return for Medicare Annual Wellness Visit in 1 year. Subjective   The following acute and/or chronic problems were also addressed today:  BPPV,     Patient's complete Health Risk Assessment and screening values have been reviewed and are found in Flowsheets. The following problems were reviewed today and where indicated follow up appointments were made and/or referrals ordered.     Positive Risk Factor Screenings with Interventions:    Fall Risk:  Do you feel unsteady or are you worried about falling? : (!) yes  2 or more falls in past year?: no  Fall with injury in past year?: no   Fall Risk Interventions:    Home safety tips provided            General Health and ACP:  General  In general, how would you say your health is?: Good  In the past 7 days, have you experienced any of the following: New or Increased Pain, New or Increased Fatigue, Loneliness, Social Isolation, Stress or Anger?: No  Do you get the social and emotional support that you need?: Yes  Do you have a Living Will?: Yes    Advance Directives       Power of [address] Will ACP-Advance Directive ACP-Power of     Not on File Not on File Not on File Not on File        General Health Risk Interventions:  Fatigue: regular exercise recommended- 3-5 times per week, 30-45 minutes per session    Health Habits/Nutrition:  Physical Activity: Inactive    Days of Exercise per Week: 0 days    Minutes of Exercise per Session: 0 min     Have you lost any weight without trying in the past 3 months?: No  Body mass index: (!) 29.6  Have you seen the dentist within the past year?: Yes  Health Habits/Nutrition Interventions:  None     Safety:  Do you have working smoke detectors?: Yes  Do you have any tripping hazards - loose or unsecured carpets or rugs?: (!) Yes  Do you have any tripping hazards - clutter in doorways, halls, or stairs?: No  Do you have either shower bars, grab bars, non-slip mats or non-slip surfaces in your shower or bathtub?: Yes  Do all of your stairways have a railing or banister?: Not Applicable  Do you always fasten your seatbelt when you are in a car?: Yes  Safety Interventions:  Home safety tips provided  Patient declines any further evaluation/treatment for this issue           Objective   Vitals:    10/03/22 1501   BP: 130/82   Site: Left Upper Arm   Position: Sitting   Cuff Size: Medium Adult   Pulse: 58   Resp: 16   Temp: 97.5 °F (36.4 °C)   TempSrc: Oral   SpO2: 97%   Weight: 189 lb (85.7 kg)   Height: 5' 7\" (1.702 m)      Body mass index is 29.6 kg/m².       General Appearance: alert and oriented to person, place and time, well developed and well- nourished, in no acute distress  Skin: warm and dry, no rash or erythema  Head: normocephalic and atraumatic  Eyes: pupils equal, round, and reactive to light, extraocular eye movements intact, conjunctivae normal, noted right drooping eyelid. ENT: tympanic membrane, external ear and ear canal normal bilaterally, nose without deformity, nasal mucosa and turbinates normal without polyps  Neck: supple and non-tender without mass, no thyromegaly or thyroid nodules, no cervical lymphadenopathy  Pulmonary/Chest: clear to auscultation bilaterally- no wheezes, rales or rhonchi, normal air movement, no respiratory distress  Cardiovascular: normal rate, regular rhythm, normal S1 and S2, no murmurs, rubs, clicks, or gallops, distal pulses intact, no carotid bruits  Abdomen: soft, non-tender, non-distended, normal bowel sounds, no masses or organomegaly  Extremities: no cyanosis, clubbing or edema  Musculoskeletal: normal range of motion, no joint swelling, deformity or tenderness  Neurologic: reflexes normal and symmetric, no cranial nerve deficit, gait, coordination and speech normal        Allergies   Allergen Reactions    Lisinopril Other (See Comments)     Cough      Iv Dye [Iodides] Rash     Prior to Visit Medications    Medication Sig Taking?  Authorizing Provider   hydroCHLOROthiazide (HYDRODIURIL) 12.5 MG tablet Take 1 tablet by mouth every morning Yes Sadaf Mathis MD   losartan (COZAAR) 50 MG tablet Take 1 tablet by mouth once daily Yes Sadaf Mathis MD   sotalol (BETAPACE) 80 MG tablet TAKE 1/2 (ONE-HALF) TABLET BY MOUTH EVERY 12 HOURS Yes Historical Provider, MD   Ascorbic Acid (VITAMIN C) 1000 MG tablet Take 1,000 mg by mouth daily Yes Historical Provider, MD   CALCIUM PO Take by mouth Yes Historical Provider, MD   Denosumab (PROLIA SC) Inject into the skin Every 6 months Yes Historical Provider, MD   ciclopirox (PENLAC) 8 % solution  Yes Historical Provider, MD   naproxen (NAPROSYN) 500 MG tablet Take 1 tablet by mouth daily Yes Carrington Fung MD   Cholecalciferol (VITAMIN D3) 2000 units CAPS Take 1 capsule by mouth daily Yes Historical Provider, MD   ipratropium-albuterol (Reena Sidle) 0.5-2.5 (3) MG/3ML SOLN nebulizer solution Inhale 1 vial into the lungs every 4 hours as needed  Yes Historical Provider, MD   budesonide-formoterol (SYMBICORT) 160-4.5 MCG/ACT AERO Inhale 2 puffs into the lungs 2 times daily Yes Anthony Donahue MD   vitamin B-12 (CYANOCOBALAMIN) 1000 MCG tablet Take 1,000 mcg by mouth every other day Yes Historical Provider, MD   albuterol sulfate HFA (PROVENTIL;VENTOLIN;PROAIR) 108 (90 Base) MCG/ACT inhaler Inhale 2 puffs into the lungs every 6 hours as needed for Wheezing Yes Historical Provider, MD   atorvastatin (LIPITOR) 80 MG tablet Take 80 mg by mouth daily Yes Historical Provider, MD   Testosterone 4 MG/24HR PT24 Place 2 mg onto the skin daily. Yes Historical Provider, MD   levothyroxine (SYNTHROID) 75 MCG tablet Take 75 mcg by mouth Daily Yes Historical Provider, MD   isosorbide mononitrate (IMDUR) 60 MG CR tablet Take 60 mg by mouth daily.  Yes Historical Provider, MD   aspirin 81 MG EC tablet   Take 81 mg by mouth daily Last dose 5-7-2015 for surgery 5- Yes Historical Provider, MD   vitamin E 100 UNIT capsule   Take 400 Units by mouth 2 times daily Last dose 5-7-2015 for surgery 5- Yes Historical Provider, MD Weaver (Including outside providers/suppliers regularly involved in providing care):   Patient Care Team:  Gloria Travis MD as PCP - General (Family Medicine)  Gloria Travis MD as PCP - [facility] Empaneled Provider     Reviewed and updated this visit:  Tobacco  Allergies  Meds  Med Hx  Surg Hx  Soc Hx  Fam Hx

## 2022-11-09 DIAGNOSIS — M47.815 OSTEOARTHRITIS OF THORACOLUMBAR SPINE, UNSPECIFIED SPINAL OSTEOARTHRITIS COMPLICATION STATUS: ICD-10-CM

## 2022-11-09 RX ORDER — NAPROXEN 500 MG/1
TABLET ORAL
Qty: 90 TABLET | Refills: 3 | Status: SHIPPED | OUTPATIENT
Start: 2022-11-09

## 2022-11-09 NOTE — TELEPHONE ENCOUNTER
Patient's last appointment was : 4/18/2022  Patient's next appointment is :   Future Appointments   Date Time Provider Glen Broussard   11/2/2023 10:00 AM Mitch Pantoja MD [facility]     Last refilled: 8/16/21    Lab Results   Component Value Date    LABA1C 5.2 04/12/2022     Lab Results   Component Value Date    CHOL 96 04/12/2022    TRIG 97 04/12/2022    HDL 23 (A) 04/12/2022    LDLCALC 53 04/12/2022    LDLDIRECT 60 05/29/2020     Lab Results   Component Value Date     06/07/2022    K 4.5 06/07/2022     06/07/2022    CO2 29 06/07/2022    BUN 13 06/07/2022    CREATININE 0.7 06/07/2022    GLUCOSE 74 06/07/2022    CALCIUM 9.3 06/07/2022    PROT 6.5 06/07/2022    LABALBU 4.0 06/07/2022    BILITOT 1.0 06/07/2022    ALKPHOS 67 06/07/2022    AST 22 06/07/2022    ALT 19 06/07/2022    LABGLOM >90 06/07/2022    AGRATIO 1.7 05/29/2020     Lab Results   Component Value Date    TSH 0.024 (L) 06/07/2022    Z0PWABW 142.41 08/06/2014    T4FREE 1.44 06/07/2022     Lab Results   Component Value Date    WBC 4.9 06/07/2022    HGB 13.4 (L) 06/07/2022    HCT 42.8 06/07/2022    MCV 95.3 (H) 06/07/2022     06/07/2022

## 2023-09-05 ENCOUNTER — TELEPHONE (OUTPATIENT)
Dept: FAMILY MEDICINE CLINIC | Age: 88
End: 2023-09-05

## 2023-09-05 NOTE — TELEPHONE ENCOUNTER
----- Message from Dawn Bennett sent at 9/5/2023 10:03 AM EDT -----  Subject: Message to Provider    QUESTIONS  Information for Provider? Pt has had a cold for about a week. He has a   cough and runny nose. He would like to know if there is something you can   prescribe for him for this or if he must make an appt. Please contact to   discuss   ---------------------------------------------------------------------------  --------------  Nabeel Marine Zenia  [phone]; OK to leave message on voicemail  ---------------------------------------------------------------------------  --------------  SCRIPT ANSWERS  Relationship to Patient?  Self

## 2023-09-06 SDOH — ECONOMIC STABILITY: HOUSING INSECURITY
IN THE LAST 12 MONTHS, WAS THERE A TIME WHEN YOU DID NOT HAVE A STEADY PLACE TO SLEEP OR SLEPT IN A SHELTER (INCLUDING NOW)?: NO

## 2023-09-06 SDOH — ECONOMIC STABILITY: INCOME INSECURITY: HOW HARD IS IT FOR YOU TO PAY FOR THE VERY BASICS LIKE FOOD, HOUSING, MEDICAL CARE, AND HEATING?: NOT HARD AT ALL

## 2023-09-06 SDOH — ECONOMIC STABILITY: TRANSPORTATION INSECURITY
IN THE PAST 12 MONTHS, HAS LACK OF TRANSPORTATION KEPT YOU FROM MEETINGS, WORK, OR FROM GETTING THINGS NEEDED FOR DAILY LIVING?: NO

## 2023-09-06 SDOH — ECONOMIC STABILITY: FOOD INSECURITY: WITHIN THE PAST 12 MONTHS, YOU WORRIED THAT YOUR FOOD WOULD RUN OUT BEFORE YOU GOT MONEY TO BUY MORE.: NEVER TRUE

## 2023-09-06 SDOH — ECONOMIC STABILITY: FOOD INSECURITY: WITHIN THE PAST 12 MONTHS, THE FOOD YOU BOUGHT JUST DIDN'T LAST AND YOU DIDN'T HAVE MONEY TO GET MORE.: NEVER TRUE

## 2023-09-06 ASSESSMENT — PATIENT HEALTH QUESTIONNAIRE - PHQ9
SUM OF ALL RESPONSES TO PHQ QUESTIONS 1-9: 0
SUM OF ALL RESPONSES TO PHQ QUESTIONS 1-9: 0
SUM OF ALL RESPONSES TO PHQ9 QUESTIONS 1 & 2: 0
1. LITTLE INTEREST OR PLEASURE IN DOING THINGS: NOT AT ALL
SUM OF ALL RESPONSES TO PHQ9 QUESTIONS 1 & 2: 0
SUM OF ALL RESPONSES TO PHQ QUESTIONS 1-9: 0
1. LITTLE INTEREST OR PLEASURE IN DOING THINGS: 0
2. FEELING DOWN, DEPRESSED OR HOPELESS: 0
2. FEELING DOWN, DEPRESSED OR HOPELESS: NOT AT ALL
SUM OF ALL RESPONSES TO PHQ QUESTIONS 1-9: 0

## 2023-09-06 NOTE — TELEPHONE ENCOUNTER
Pt wife Jerry Gonzalez called again c/o cough, sneezing, runny nose, chest congestion. Pt has tried sudafed and nyquil at night. Pt is requesting medication to help with the symptoms. Pharmacy [facility] [address] verified.

## 2023-09-06 NOTE — TELEPHONE ENCOUNTER
Would recommend appt at this time for evaluation. Pt may need further testing (COVID, Flu, etc). Resident schedule ok.   ES

## 2023-09-07 ENCOUNTER — OFFICE VISIT (OUTPATIENT)
Dept: FAMILY MEDICINE CLINIC | Age: 88
End: 2023-09-07

## 2023-09-07 VITALS
OXYGEN SATURATION: 95 % | RESPIRATION RATE: 12 BRPM | WEIGHT: 183.6 LBS | HEART RATE: 80 BPM | SYSTOLIC BLOOD PRESSURE: 130 MMHG | BODY MASS INDEX: 28.82 KG/M2 | DIASTOLIC BLOOD PRESSURE: 88 MMHG | TEMPERATURE: 97.5 F | HEIGHT: 67 IN

## 2023-09-07 DIAGNOSIS — J02.9 SORE THROAT: ICD-10-CM

## 2023-09-07 DIAGNOSIS — R05.1 ACUTE COUGH: ICD-10-CM

## 2023-09-07 DIAGNOSIS — R09.89 RUNNY NOSE: ICD-10-CM

## 2023-09-07 DIAGNOSIS — J06.9 VIRAL URI: Primary | ICD-10-CM

## 2023-09-07 ASSESSMENT — ENCOUNTER SYMPTOMS
SORE THROAT: 0
SHORTNESS OF BREATH: 0
WHEEZING: 0
RHINORRHEA: 0
SINUS PAIN: 0
COUGH: 0

## 2023-09-07 NOTE — PROGRESS NOTES
Patient:Tera Andre Sex: male  YOB: 1935 Age:87 y.o. MRN: [id number]    HPI   Chief Complaint: URI (Pt presents c/o nasal drainage and non productive cough which started about 1 week ago. He had a sore throat. Pt denies chills, body aches, fatigue, or fevers. He has tried mucinex, cough drops, nyquil, and cough syrup which seems to help some. Pt does report the sxs are improving. )    HPI  Cristina Mccrary is a 81 y.o. male with a PMHx of CAD, COPD, GERD, HLD, HTN, hypothyroidism, LISA, OA, DAMASO who came to the clinic for cold symptoms. Patient states that about 2-1/2 weeks ago when he was discharged from [facility] for vertigo he came home, his wife had a cold and thinks he contracted it. He had a sore throat, cough, sneezing, runny nose for over a week and 1/2 to 2 weeks. During that time he took NyQuil at night, recently started taking Mucinex with improvement in symptoms. States he did not take a covid test at home    Today he states that his sore throat has resolved, his cough and runny nose have improved. He would just  like to make sure he \"kicks\" the cold. Denies any fevers, chills, chest pain, cough,'s mucus/sputum production          Patient History    Past Medical History:  Patient has a past medical history of BCC (basal cell carcinoma), face, CAD (coronary artery disease), COPD (chronic obstructive pulmonary disease) ([address]), GERD (gastroesophageal reflux disease), Hyperlipidemia, Hypertension, Hypothyroidism, Irregular heart beat, LISA (nonalcoholic steatohepatitis), OA (osteoarthritis), Sleep apnea, and Testosterone deficiency. Social History:  Patient reports that he quit smoking about 42 years ago. His smoking use included cigarettes. He has a 60.00 pack-year smoking history. He has never used smokeless tobacco. He reports current alcohol use. He reports that he does not use drugs.      Past Surgical History:   Procedure Laterality Date    CARDIAC CATHETERIZATION  10/04/2010
seen the patient and performed key portions of the examination. I agree with the documented assessment and plan as documented by the resident.         Christ Hirsch DO 9/9/2023 4:52 PM

## 2023-10-25 NOTE — PROGRESS NOTES
[address] [address]  Dept: [phone]  Dept Fax: [phone]  QUE Quevedo is a 81 y. o.male    Pt presents for Medicare Annual Wellness physical exam.      Patient also presents for follow-up of HTN, Hyperlipidemia, Hypothyroidism, CAD, COPD, GERD, Testosterone Deficiency, OA, Osteopenia, DAMASO on CPAP and abnormal \" extra heart beats\". Pt now living with wife at [facility] in [facility] since 9/22/2023- gets 2 meals/ day at the \"big house\". Glucometer readings at home are not needed. The home BP readings have not been checked regularly. Pt denies any orthostatic symptoms at this time. Pt doing well at this time- denies any new complaints    Wt Readings from Last 3 Encounters:   11/02/23 81.6 kg (180 lb)   09/07/23 83.3 kg (183 lb 9.6 oz)   10/03/22 85.7 kg (189 lb)   Weight decreased 9# since last visit 12 months ago. Appetite ok. Pt has cut using all soda (Starry) and chips. Pt stable since last visit- no new problems for diagnoses listed below:  Patient Active Problem List   Diagnosis    Essential hypertension    Irregular heart beat    Hypothyroidism (Dr. Adolfo Long)    Hyperlipidemia    Testosterone deficiency ([facility])     DAMASO on CPAP    Coronary artery disease involving native coronary artery of native heart without angina pectoris- (Dr. Rowena Boo)    Gastroesophageal reflux disease    Osteoarthritis of thoracolumbar spine    COPD (chronic obstructive pulmonary disease) ([facility])    Osteopenia    BPPV (benign paroxysmal positional vertigo), left       Review of Systems   Constitutional:  Negative for diaphoresis, fatigue, fever and unexpected weight change. Chills: hands always cold. Eyes:  Negative for visual disturbance. Respiratory:  Negative for chest tightness and shortness of breath.     Cardiovascular:  Positive for leg swelling (in ankles- better with

## 2023-11-01 SDOH — HEALTH STABILITY: PHYSICAL HEALTH: ON AVERAGE, HOW MANY DAYS PER WEEK DO YOU ENGAGE IN MODERATE TO STRENUOUS EXERCISE (LIKE A BRISK WALK)?: 5 DAYS

## 2023-11-01 SDOH — HEALTH STABILITY: PHYSICAL HEALTH: ON AVERAGE, HOW MANY MINUTES DO YOU ENGAGE IN EXERCISE AT THIS LEVEL?: 50 MIN

## 2023-11-01 ASSESSMENT — PATIENT HEALTH QUESTIONNAIRE - PHQ9
1. LITTLE INTEREST OR PLEASURE IN DOING THINGS: 0
SUM OF ALL RESPONSES TO PHQ QUESTIONS 1-9: 0
2. FEELING DOWN, DEPRESSED OR HOPELESS: 0
SUM OF ALL RESPONSES TO PHQ QUESTIONS 1-9: 0
SUM OF ALL RESPONSES TO PHQ9 QUESTIONS 1 & 2: 0

## 2023-11-01 ASSESSMENT — LIFESTYLE VARIABLES
HOW MANY STANDARD DRINKS CONTAINING ALCOHOL DO YOU HAVE ON A TYPICAL DAY: 1 OR 2
HOW OFTEN DO YOU HAVE SIX OR MORE DRINKS ON ONE OCCASION: 1
HOW MANY STANDARD DRINKS CONTAINING ALCOHOL DO YOU HAVE ON A TYPICAL DAY: 1

## 2023-11-02 ENCOUNTER — OFFICE VISIT (OUTPATIENT)
Dept: FAMILY MEDICINE CLINIC | Age: 88
End: 2023-11-02

## 2023-11-02 VITALS
DIASTOLIC BLOOD PRESSURE: 68 MMHG | BODY MASS INDEX: 28.25 KG/M2 | WEIGHT: 180 LBS | RESPIRATION RATE: 24 BRPM | SYSTOLIC BLOOD PRESSURE: 102 MMHG | HEIGHT: 67 IN | TEMPERATURE: 97.5 F | HEART RATE: 56 BPM | OXYGEN SATURATION: 96 %

## 2023-11-02 DIAGNOSIS — I10 ESSENTIAL HYPERTENSION: ICD-10-CM

## 2023-11-02 DIAGNOSIS — J94.8 CALCIFIED PLEURAL PLAQUE ON CHEST X-RAY: ICD-10-CM

## 2023-11-02 DIAGNOSIS — J44.9 CHRONIC OBSTRUCTIVE PULMONARY DISEASE, UNSPECIFIED COPD TYPE (HCC): ICD-10-CM

## 2023-11-02 DIAGNOSIS — M47.815 OSTEOARTHRITIS OF THORACOLUMBAR SPINE, UNSPECIFIED SPINAL OSTEOARTHRITIS COMPLICATION STATUS: ICD-10-CM

## 2023-11-02 DIAGNOSIS — D64.9 ANEMIA, UNSPECIFIED TYPE: ICD-10-CM

## 2023-11-02 DIAGNOSIS — K21.9 GASTROESOPHAGEAL REFLUX DISEASE, UNSPECIFIED WHETHER ESOPHAGITIS PRESENT: ICD-10-CM

## 2023-11-02 DIAGNOSIS — Z00.00 MEDICARE ANNUAL WELLNESS VISIT, SUBSEQUENT: Primary | ICD-10-CM

## 2023-11-02 DIAGNOSIS — I25.10 CORONARY ARTERY DISEASE INVOLVING NATIVE CORONARY ARTERY OF NATIVE HEART WITHOUT ANGINA PECTORIS: Chronic | ICD-10-CM

## 2023-11-02 DIAGNOSIS — E03.9 HYPOTHYROIDISM, UNSPECIFIED TYPE: ICD-10-CM

## 2023-11-02 DIAGNOSIS — G47.33 OSA ON CPAP: ICD-10-CM

## 2023-11-02 DIAGNOSIS — E34.9 TESTOSTERONE DEFICIENCY: ICD-10-CM

## 2023-11-02 DIAGNOSIS — M85.80 OSTEOPENIA, UNSPECIFIED LOCATION: ICD-10-CM

## 2023-11-02 DIAGNOSIS — E78.5 HYPERLIPIDEMIA, UNSPECIFIED HYPERLIPIDEMIA TYPE: ICD-10-CM

## 2023-11-02 RX ORDER — NAPROXEN 500 MG/1
500 TABLET ORAL DAILY
Qty: 90 TABLET | Refills: 3 | Status: SHIPPED | OUTPATIENT
Start: 2023-11-02

## 2023-11-02 RX ORDER — ATORVASTATIN CALCIUM 80 MG/1
80 TABLET, FILM COATED ORAL DAILY
Qty: 90 TABLET | Refills: 3 | Status: SHIPPED | OUTPATIENT
Start: 2023-11-02

## 2023-11-02 ASSESSMENT — ENCOUNTER SYMPTOMS
ABDOMINAL PAIN: 0
CONSTIPATION: 0
NAUSEA: 0
CHEST TIGHTNESS: 0
VOMITING: 0
ANAL BLEEDING: 0
SHORTNESS OF BREATH: 0
BLOOD IN STOOL: 0
DIARRHEA: 0

## 2023-11-02 NOTE — PROGRESS NOTES
Medicare Annual Wellness Visit    Jenise Sol is here for Medicare AWV    Assessment & Plan   Medicare annual wellness visit, subsequent  Essential hypertension  -     Basic Metabolic Panel; Future  -     Lipid Panel; Future  -     Comprehensive Metabolic Panel; Future  Hyperlipidemia, unspecified hyperlipidemia type  -     Lipid Panel; Future  -     Comprehensive Metabolic Panel; Future  -     atorvastatin (LIPITOR) 80 MG tablet; Take 1 tablet by mouth daily, Disp-90 tablet, R-3Normal  Chronic obstructive pulmonary disease, unspecified COPD type ([address])  -     CBC with Auto Differential; Future  Hypothyroidism, unspecified type  Osteoarthritis of thoracolumbar spine, unspecified spinal osteoarthritis complication status  -     naproxen (NAPROSYN) 500 MG tablet; Take 1 tablet by mouth daily, Disp-90 tablet, R-3Normal  Calcified pleural plaque on chest x-ray  Testosterone deficiency (Dr. Carmine Garrett- [facility])   Coronary artery disease involving native coronary artery of native heart without angina pectoris- (Dr. Amanda Taylor)  Gastroesophageal reflux disease, unspecified whether esophagitis present  Osteopenia, unspecified location  Anemia, unspecified type  -     CBC with Auto Differential; Future  -     CBC with Auto Differential; Future  DAMASO on CPAP    Recommendations for Preventive Services Due: see orders and patient instructions/AVS.  Recommended screening schedule for the next 5-10 years is provided to the patient in written form: see Patient Instructions/AVS.     Return in about 6 months (around 5/2/2024). Subjective       Patient's complete Health Risk Assessment and screening values have been reviewed and are found in Flowsheets. The following problems were reviewed today and where indicated follow up appointments were made and/or referrals ordered.     Positive Risk Factor Screenings with Interventions:                     Safety:  Do you have any tripping hazards - loose or unsecured carpets or rugs?: (!)

## 2023-11-02 NOTE — PATIENT INSTRUCTIONS
Home BP's- Call if > 140/90 on a regular basis. Continue to follow-up with Alejandra Patel (Dr. Adolfo Long-) as planned for thyroid management  Follow up with Dr. Darrel Vasquez (Hem/ Oncology at [facility]) for elevated \"Protein in blood\"), Dr. Rafael Sierra [facility] pulmonology), Dr. Rowena Boo [facility] cardiology), Dr. Clive Khan (GI) PRN, and [facility] ([facility]) as planned. Follow up with [facility] for Pituitary Tumor-Dr. Yessy Greene as needed only at this time. Check BMP and CBC at this time. Check FLP, CMP, and CBC in 6 months  Continue current medicines. Refills  Drop off copy of Living Will   Follow up in 6 months with resident physician on Thursday afternoon  Follow-up in 12 months with me          Preventive Health Topics (updated 11/2/2023):  Encouraged COVID VACCINE booster  Encouraged RSV vaccine  COLONOSCOPY done 10/17/2014 per Dr. Clive Khan- no further mandated. Will hold further colon cancer screening due to age (unless symptomatic)   DEXA management per [facility]- to follow up 11/6/2023-  On Prolia at this time every 6 months. PSA remains on hold at this time due to age and fact that PSA has always been very low in past.  [address] done per [facility]- to do Summer 2024- results ok in Summer 2023- to follow up annually. A Healthy Heart: Care Instructions  Your Care Instructions     Coronary artery disease, also called heart disease, occurs when a substance called plaque builds up in the vessels that supply oxygen-rich blood to your heart muscle. This can narrow the blood vessels and reduce blood flow. A heart attack happens when blood flow is completely blocked. A high-fat diet, smoking, and other factors increase the risk of heart disease. Your doctor has found that you have a chance of having heart disease. You can do lots of things to keep your heart healthy. It may not be easy, but you can change your diet, exercise more, and quit smoking. These steps really work to lower your chance of heart disease.   Follow-up

## 2023-12-04 ENCOUNTER — NURSE ONLY (OUTPATIENT)
Dept: LAB | Age: 88
End: 2023-12-04

## 2023-12-04 DIAGNOSIS — I10 ESSENTIAL HYPERTENSION: ICD-10-CM

## 2023-12-04 DIAGNOSIS — D64.9 ANEMIA, UNSPECIFIED TYPE: ICD-10-CM

## 2023-12-04 LAB
ANION GAP SERPL CALC-SCNC: 6 MEQ/L (ref 8–16)
BASOPHILS ABSOLUTE: 0.1 THOU/MM3 (ref 0–0.1)
BASOPHILS NFR BLD AUTO: 1.7 %
BUN SERPL-MCNC: 15 MG/DL (ref 7–22)
CALCIUM SERPL-MCNC: 9.5 MG/DL (ref 8.5–10.5)
CHLORIDE SERPL-SCNC: 101 MEQ/L (ref 98–111)
CO2 SERPL-SCNC: 32 MEQ/L (ref 23–33)
CREAT SERPL-MCNC: 0.8 MG/DL (ref 0.4–1.2)
DEPRECATED RDW RBC AUTO: 42.2 FL (ref 35–45)
EOSINOPHIL NFR BLD AUTO: 9.9 %
EOSINOPHILS ABSOLUTE: 0.5 THOU/MM3 (ref 0–0.4)
ERYTHROCYTE [DISTWIDTH] IN BLOOD BY AUTOMATED COUNT: 12.3 % (ref 11.5–14.5)
GFR SERPL CREATININE-BSD FRML MDRD: > 60 ML/MIN/1.73M2
GLUCOSE SERPL-MCNC: 91 MG/DL (ref 70–108)
HCT VFR BLD AUTO: 36.3 % (ref 42–52)
HGB BLD-MCNC: 11.8 GM/DL (ref 14–18)
IMM GRANULOCYTES # BLD AUTO: 0.01 THOU/MM3 (ref 0–0.07)
IMM GRANULOCYTES NFR BLD AUTO: 0.2 %
LYMPHOCYTES ABSOLUTE: 1.8 THOU/MM3 (ref 1–4.8)
LYMPHOCYTES NFR BLD AUTO: 37.8 %
MCH RBC QN AUTO: 30 PG (ref 26–33)
MCHC RBC AUTO-ENTMCNC: 32.5 GM/DL (ref 32.2–35.5)
MCV RBC AUTO: 92.4 FL (ref 80–94)
MONOCYTES ABSOLUTE: 0.6 THOU/MM3 (ref 0.4–1.3)
MONOCYTES NFR BLD AUTO: 12.8 %
NEUTROPHILS NFR BLD AUTO: 37.6 %
NRBC BLD AUTO-RTO: 0 /100 WBC
PLATELET # BLD AUTO: 161 THOU/MM3 (ref 130–400)
PMV BLD AUTO: 10.7 FL (ref 9.4–12.4)
POTASSIUM SERPL-SCNC: 4.6 MEQ/L (ref 3.5–5.2)
RBC # BLD AUTO: 3.93 MILL/MM3 (ref 4.7–6.1)
SEGMENTED NEUTROPHILS ABSOLUTE COUNT: 1.8 THOU/MM3 (ref 1.8–7.7)
SODIUM SERPL-SCNC: 139 MEQ/L (ref 135–145)
WBC # BLD AUTO: 4.8 THOU/MM3 (ref 4.8–10.8)

## 2023-12-05 ENCOUNTER — TELEPHONE (OUTPATIENT)
Dept: FAMILY MEDICINE CLINIC | Age: 88
End: 2023-12-05

## 2023-12-05 DIAGNOSIS — D64.9 ANEMIA, UNSPECIFIED TYPE: Primary | ICD-10-CM

## 2023-12-05 NOTE — TELEPHONE ENCOUNTER
----- Message from Uriah Villalobos MD sent at 12/4/2023  2:31 PM EST -----  Notify pt-   Results reviewed. CBC okay, except hemoglobin and hematocrit low at 11.8/36. 3-this continues on somewhat of a downtrend at this time-please confirm patient has no active bleeding or bruising. Please also confirm patient not having any issues with black, tarry bowel movements. Please confirm patient continues to see his hematologist on a regular basis. We could consider stool Hemoccult cards to check for blood in bowel movements, if patient interested. BMP okay  Let me know. Thanks.   ES

## 2023-12-05 NOTE — TELEPHONE ENCOUNTER
Discussion noted. Hemoccults ordered per lab. Please notify patient. Please also make sure patient continues to follow with his hematologist and that they are aware of his anemia. Thanks.   ES

## 2023-12-07 ENCOUNTER — HOSPITAL ENCOUNTER (EMERGENCY)
Age: 88
Discharge: HOME | End: 2023-12-07
Attending: STUDENT IN AN ORGANIZED HEALTH CARE EDUCATION/TRAINING PROGRAM
Payer: MEDICARE

## 2023-12-07 ENCOUNTER — APPOINTMENT (OUTPATIENT)
Dept: CT IMAGING | Age: 88
End: 2023-12-07
Payer: MEDICARE

## 2023-12-07 ENCOUNTER — APPOINTMENT (OUTPATIENT)
Dept: GENERAL RADIOLOGY | Age: 88
End: 2023-12-07
Payer: MEDICARE

## 2023-12-07 VITALS
WEIGHT: 185 LBS | RESPIRATION RATE: 16 BRPM | BODY MASS INDEX: 28.98 KG/M2 | HEART RATE: 57 BPM | SYSTOLIC BLOOD PRESSURE: 173 MMHG | TEMPERATURE: 97.8 F | OXYGEN SATURATION: 98 % | DIASTOLIC BLOOD PRESSURE: 86 MMHG

## 2023-12-07 DIAGNOSIS — R42 DIZZINESS: Primary | ICD-10-CM

## 2023-12-07 LAB
ANION GAP SERPL CALC-SCNC: 8 MEQ/L (ref 8–16)
BASOPHILS ABSOLUTE: 0.1 THOU/MM3 (ref 0–0.1)
BASOPHILS NFR BLD AUTO: 1.5 %
BUN SERPL-MCNC: 14 MG/DL (ref 7–22)
CALCIUM SERPL-MCNC: 9.7 MG/DL (ref 8.5–10.5)
CHLORIDE SERPL-SCNC: 97 MEQ/L (ref 98–111)
CO2 SERPL-SCNC: 29 MEQ/L (ref 23–33)
CREAT SERPL-MCNC: 0.8 MG/DL (ref 0.4–1.2)
DEPRECATED RDW RBC AUTO: 41.4 FL (ref 35–45)
EKG ATRIAL RATE: 61 BPM
EKG P AXIS: 29 DEGREES
EKG P-R INTERVAL: 192 MS
EKG Q-T INTERVAL: 440 MS
EKG QRS DURATION: 112 MS
EKG QTC CALCULATION (BAZETT): 442 MS
EKG R AXIS: 20 DEGREES
EKG T AXIS: 47 DEGREES
EKG VENTRICULAR RATE: 61 BPM
EOSINOPHIL NFR BLD AUTO: 9.1 %
EOSINOPHILS ABSOLUTE: 0.4 THOU/MM3 (ref 0–0.4)
ERYTHROCYTE [DISTWIDTH] IN BLOOD BY AUTOMATED COUNT: 12.2 % (ref 11.5–14.5)
GFR SERPL CREATININE-BSD FRML MDRD: > 60 ML/MIN/1.73M2
GLUCOSE SERPL-MCNC: 86 MG/DL (ref 70–108)
HCT VFR BLD AUTO: 36.6 % (ref 42–52)
HEMOCCULT STL QL: NEGATIVE
HGB BLD-MCNC: 12.1 GM/DL (ref 14–18)
IMM GRANULOCYTES # BLD AUTO: 0.01 THOU/MM3 (ref 0–0.07)
IMM GRANULOCYTES NFR BLD AUTO: 0.2 %
LYMPHOCYTES ABSOLUTE: 1.7 THOU/MM3 (ref 1–4.8)
LYMPHOCYTES NFR BLD AUTO: 36.3 %
MCH RBC QN AUTO: 30.8 PG (ref 26–33)
MCHC RBC AUTO-ENTMCNC: 33.1 GM/DL (ref 32.2–35.5)
MCV RBC AUTO: 93.1 FL (ref 80–94)
MONOCYTES ABSOLUTE: 0.7 THOU/MM3 (ref 0.4–1.3)
MONOCYTES NFR BLD AUTO: 14.3 %
NEUTROPHILS NFR BLD AUTO: 38.6 %
NRBC BLD AUTO-RTO: 0 /100 WBC
NT-PROBNP SERPL IA-MCNC: 689.9 PG/ML (ref 0–449)
OSMOLALITY SERPL CALC.SUM OF ELEC: 268 MOSMOL/KG (ref 275–300)
PLATELET # BLD AUTO: 171 THOU/MM3 (ref 130–400)
PMV BLD AUTO: 10.6 FL (ref 9.4–12.4)
POTASSIUM SERPL-SCNC: 4.6 MEQ/L (ref 3.5–5.2)
RBC # BLD AUTO: 3.93 MILL/MM3 (ref 4.7–6.1)
SEGMENTED NEUTROPHILS ABSOLUTE COUNT: 1.8 THOU/MM3 (ref 1.8–7.7)
SODIUM SERPL-SCNC: 134 MEQ/L (ref 135–145)
TROPONIN, HIGH SENSITIVITY: 17 NG/L (ref 0–12)
TROPONIN, HIGH SENSITIVITY: 21 NG/L (ref 0–12)
WBC # BLD AUTO: 4.6 THOU/MM3 (ref 4.8–10.8)

## 2023-12-07 PROCEDURE — 70450 CT HEAD/BRAIN W/O DYE: CPT

## 2023-12-07 PROCEDURE — 36415 COLL VENOUS BLD VENIPUNCTURE: CPT

## 2023-12-07 PROCEDURE — 71046 X-RAY EXAM CHEST 2 VIEWS: CPT

## 2023-12-07 PROCEDURE — 82272 OCCULT BLD FECES 1-3 TESTS: CPT

## 2023-12-07 PROCEDURE — 93010 ELECTROCARDIOGRAM REPORT: CPT | Performed by: INTERNAL MEDICINE

## 2023-12-07 PROCEDURE — 93005 ELECTROCARDIOGRAM TRACING: CPT | Performed by: STUDENT IN AN ORGANIZED HEALTH CARE EDUCATION/TRAINING PROGRAM

## 2023-12-07 PROCEDURE — 85025 COMPLETE CBC W/AUTO DIFF WBC: CPT

## 2023-12-07 PROCEDURE — 99285 EMERGENCY DEPT VISIT HI MDM: CPT

## 2023-12-07 PROCEDURE — 80048 BASIC METABOLIC PNL TOTAL CA: CPT

## 2023-12-07 PROCEDURE — 84484 ASSAY OF TROPONIN QUANT: CPT

## 2023-12-07 PROCEDURE — 83880 ASSAY OF NATRIURETIC PEPTIDE: CPT

## 2023-12-07 PROCEDURE — 6370000000 HC RX 637 (ALT 250 FOR IP): Performed by: STUDENT IN AN ORGANIZED HEALTH CARE EDUCATION/TRAINING PROGRAM

## 2023-12-07 RX ORDER — MECLIZINE HCL 25MG 25 MG/1
25 TABLET, CHEWABLE ORAL ONCE
Status: DISCONTINUED | OUTPATIENT
Start: 2023-12-07 | End: 2023-12-07

## 2023-12-07 RX ORDER — MECLIZINE HCL 25MG 25 MG/1
25 TABLET, CHEWABLE ORAL 3 TIMES DAILY PRN
Qty: 30 TABLET | Refills: 0 | Status: SHIPPED | OUTPATIENT
Start: 2023-12-07 | End: 2023-12-17

## 2023-12-07 RX ORDER — MECLIZINE HCL 25MG 25 MG/1
25 TABLET, CHEWABLE ORAL 3 TIMES DAILY PRN
Qty: 30 TABLET | Refills: 0 | Status: SHIPPED | OUTPATIENT
Start: 2023-12-07 | End: 2023-12-07 | Stop reason: SDUPTHER

## 2023-12-07 RX ORDER — MECLIZINE HCL 25MG 25 MG/1
25 TABLET, CHEWABLE ORAL ONCE
Status: COMPLETED | OUTPATIENT
Start: 2023-12-07 | End: 2023-12-07

## 2023-12-07 RX ADMIN — MECLIZINE HYDROCHLORIDE 25 MG: 25 TABLET, CHEWABLE ORAL at 08:57

## 2023-12-07 ASSESSMENT — PAIN - FUNCTIONAL ASSESSMENT: PAIN_FUNCTIONAL_ASSESSMENT: NONE - DENIES PAIN

## 2023-12-07 NOTE — ED NOTES
Pt to ED via intake with c/o dizziness. Pt reports they have been dizzy since Tuesday. Pt reports when they close their eyes the dizziness subsides. Pt denies falls or any other symptoms. Pt reports living at home with their wife and using a walker to assist with ambulation.       Loraine Ocasio RN  12/07/23 8849

## 2023-12-07 NOTE — DISCHARGE INSTRUCTIONS
Please  and take the prescribed meclizine. I sent a chewable tablet to your pharmacy. As we discussed please follow-up with your primary doctor or you may want to schedule appoint with local ear nose and throat doctor. If began to have weakness, numbness or tingling in addition to the dizziness do not hesitate to return to the emergency department immediately. Utilize a walker or cane to stabilize yourself when ambulating.

## 2023-12-07 NOTE — ED PROVIDER NOTES
[facility] EMERGENCY DEPT    EMERGENCY MEDICINE      Pt Name: Cecy Espino  MRN: [id number]  [address] [facility] 1935  Date of evaluation: 12/7/2023  Treating Physician: Valentino Davis DO    CHIEF COMPLAINT       Chief Complaint   Patient presents with    Dizziness     History obtained from chart review, the patient, and wife at bedside. HISTORY OF PRESENT ILLNESS    HPI    Cecy Espino is a 81 y.o. male presents to the emergency department for evaluation of dizziness. This is been ongoing since Tuesday. Has history of BPPV and feels that it is mildly different than any of his previous vertigo exacerbations. Worse with his eyes open. Denies any blurred vision, fevers, chills, chest pain or shortness of breath. Is being evaluated by his PCP for anemia and was planning to have a stool occult card done. Denies any dark or tarry stools. No other aggravating alleviating factors identified. Patient is without pain. The patient has no other acute complaints at this time.       PAST MEDICAL AND SURGICAL HISTORY     Past Medical History:   Diagnosis Date    BCC (basal cell carcinoma), face     Nasal- Dr. Villalobos [place], [place]    CAD (coronary artery disease)     COPD (chronic obstructive pulmonary disease) ([facility]) 12/2017    dx'd in [place]    GERD (gastroesophageal reflux disease)     GERD    Hyperlipidemia     Hypertension     Hypothyroidism     Irregular heart beat     LISA (nonalcoholic steatohepatitis)     OA (osteoarthritis)     Sleep apnea     uses cpap    Testosterone deficiency        Past Surgical History:   Procedure Laterality Date    CARDIAC CATHETERIZATION  10/04/2010    CARDIAC CATHETERIZATION  2001    stent x1    CHG US GUIDANCE NEEDLE PLACEMENT IMG S&I  06/2016    Benign, done in [facility]      last one 2010    EYE SURGERY  2012    bilateral cataracts    JOINT REPLACEMENT  1995    right knee    JOINT REPLACEMENT  1998    left knee    JOINT REPLACEMENT  2014 - aug.    right shoulder

## 2023-12-11 ENCOUNTER — TELEPHONE (OUTPATIENT)
Dept: ENT CLINIC | Age: 88
End: 2023-12-11

## 2023-12-11 NOTE — TELEPHONE ENCOUNTER
On chart review patient seen in ER for exacerbation of known history of BPPV. He can be seen at available with next available provider if he would like further ENT work-up regarding his dizziness. Please instruct patient that he could request referral to vestibular therapy by his PCP at his follow-up appt with them as well. We can do further audiologic evaluation when we establish with patient.

## 2023-12-11 NOTE — TELEPHONE ENCOUNTER
Patient's wife, Candance Bongo, who is on HIPAA, left message on clinical voicemail stating patient went to [facility] ER for his Vertigo. Dr Sybil Cruz referred patient to our office. Please review chart and advise as to how soon patient needs to be seen.

## 2024-01-17 ENCOUNTER — OFFICE VISIT (OUTPATIENT)
Dept: ENT CLINIC | Age: 89
End: 2024-01-17
Payer: MEDICARE

## 2024-01-17 VITALS
RESPIRATION RATE: 12 BRPM | OXYGEN SATURATION: 95 % | HEART RATE: 68 BPM | DIASTOLIC BLOOD PRESSURE: 74 MMHG | WEIGHT: 183 LBS | SYSTOLIC BLOOD PRESSURE: 118 MMHG | TEMPERATURE: 97.2 F | HEIGHT: 67 IN | BODY MASS INDEX: 28.72 KG/M2

## 2024-01-17 DIAGNOSIS — H81.10 BENIGN PAROXYSMAL POSITIONAL VERTIGO, UNSPECIFIED LATERALITY: Primary | ICD-10-CM

## 2024-01-17 PROCEDURE — G8484 FLU IMMUNIZE NO ADMIN: HCPCS | Performed by: PHYSICIAN ASSISTANT

## 2024-01-17 PROCEDURE — 99203 OFFICE O/P NEW LOW 30 MIN: CPT | Performed by: PHYSICIAN ASSISTANT

## 2024-01-17 PROCEDURE — 1123F ACP DISCUSS/DSCN MKR DOCD: CPT | Performed by: PHYSICIAN ASSISTANT

## 2024-01-17 PROCEDURE — G8417 CALC BMI ABV UP PARAM F/U: HCPCS | Performed by: PHYSICIAN ASSISTANT

## 2024-01-17 PROCEDURE — G8427 DOCREV CUR MEDS BY ELIG CLIN: HCPCS | Performed by: PHYSICIAN ASSISTANT

## 2024-01-17 PROCEDURE — 1036F TOBACCO NON-USER: CPT | Performed by: PHYSICIAN ASSISTANT

## 2024-01-17 NOTE — PROGRESS NOTES
[facility] [place] [facility]  [facility] EAR, NOSE AND THROAT  [address]  [address]  [facility] [address]  Dept: [phone]  Dept Fax: [phone]  Loc: [phone]    Tera Andre is a 88 y.o. male who was referred by Papa Parsons DO for:  Chief Complaint   Patient presents with    New Patient     New patient here for dizziness. Patient states he has been having the dizziness since December.   .    HPI:     Tera Andre presents today for evaluation of vertigo. He reports three flares of vertigo over the last 2 years. He describes feeling lightheaded but with sensation of the room spinning. He gets nauseous, but does not vomit. His most recent episode started about 5 weeks ago when he was standing and he suddenly felt dizzy. The first episode happened while he was sitting, watching TV and the initial episode woke him up in the middle of the night. He has previously been diagnosed with BPPV with past flares.  His symptoms improved over the last few weeks since his flare on 12/7/23. He has seen his chiropractor the last 3 weeks and he performed the epley which has seemed to help. The patient is still anxious because he feels like the vertigo could come back at any moment. He sees vestibular therapy through [facility]. He reports epley was performed once and they have been doing stepping/walking exercises ever since. He states he has been in vestibular therapy most weeks since the second episode in August 2023. He saw ENT at [facility] who told him to stop taking meclizine, but patient reports wasn't offered any other treatments/recommendations. The patient [facility]. As part of his work up, he had an audiogram recently at [facility], but uncertain of results.      Subjective:      REVIEW OF SYSTEMS:    Pertinent positives as noted in the HPI. All other systems reviewed and negative.    ALLERGIES:  Lisinopril and Iv dye [iodides]    Past Medical

## 2024-01-18 ENCOUNTER — OFFICE VISIT (OUTPATIENT)
Dept: FAMILY MEDICINE CLINIC | Age: 89
End: 2024-01-18

## 2024-01-18 ENCOUNTER — TELEPHONE (OUTPATIENT)
Dept: ENT CLINIC | Age: 89
End: 2024-01-18

## 2024-01-18 VITALS
SYSTOLIC BLOOD PRESSURE: 130 MMHG | RESPIRATION RATE: 16 BRPM | HEIGHT: 67 IN | TEMPERATURE: 97.8 F | HEART RATE: 65 BPM | OXYGEN SATURATION: 97 % | DIASTOLIC BLOOD PRESSURE: 82 MMHG | WEIGHT: 183 LBS | BODY MASS INDEX: 28.72 KG/M2

## 2024-01-18 DIAGNOSIS — I10 ESSENTIAL HYPERTENSION: Primary | ICD-10-CM

## 2024-01-18 DIAGNOSIS — R05.1 ACUTE COUGH: ICD-10-CM

## 2024-01-18 DIAGNOSIS — I25.10 CORONARY ARTERY DISEASE INVOLVING NATIVE CORONARY ARTERY OF NATIVE HEART WITHOUT ANGINA PECTORIS: Chronic | ICD-10-CM

## 2024-01-18 DIAGNOSIS — H81.12 BPPV (BENIGN PAROXYSMAL POSITIONAL VERTIGO), LEFT: ICD-10-CM

## 2024-01-18 RX ORDER — LOSARTAN POTASSIUM 100 MG/1
100 TABLET ORAL DAILY
Qty: 30 TABLET | Refills: 1 | Status: SHIPPED | OUTPATIENT
Start: 2024-01-18

## 2024-01-18 RX ORDER — BENZONATATE 100 MG/1
100 CAPSULE ORAL 3 TIMES DAILY PRN
Qty: 30 CAPSULE | Refills: 0 | Status: SHIPPED | OUTPATIENT
Start: 2024-01-18 | End: 2024-01-28

## 2024-01-18 ASSESSMENT — ENCOUNTER SYMPTOMS
VOMITING: 0
DIARRHEA: 0
ABDOMINAL PAIN: 0
NAUSEA: 0
SHORTNESS OF BREATH: 0

## 2024-01-18 NOTE — TELEPHONE ENCOUNTER
I attempted to contact the primary and secondary phone number listed in the chart. The primary went immediately to voicemail and the secondary number rang twice before going to voicemail. I left a message requesting the patient/wife to call the office back at their convenience to further discuss options regarding vertigo. Let me know when they call back so I can speak with them please!

## 2024-01-18 NOTE — TELEPHONE ENCOUNTER
The patient's wife called me back because her  was in a visit with the resident clinic.  I informed her that unfortunately I was unable to find any reliable/new information regarding a ultrasound/ultrasonic procedure to try and break the crystals in the ear.  I informed her that I even reached out to Dr. Rojas in the ER since patient/wife reported they were informed of this procedure while visiting the ER and they believed it was Dr. Rojas.  My discussion with Dr. Rojas indicates that this was not discussed in the ER at that time and he admits that he has never heard/seen this as an option as he typically recommends the Epley.    I informed the wife that I would be willing to refer him to a tertiary center, such as [facility] to see if they have any other additional information or ideas regarding this.  However, I researched their website and there was no mention of this as a treatment option for BPPV.  I offered watchful waiting and monitoring for the time being as the alternative.  He can continue with vestibular therapy and following with his chiropractor for Epley as it does seem to help and hopefully he will not have recurrence.  Patient's wife expressed understanding and thanked me for the information.  She will talk to her  today/tomorrow and make a decision on what they would like to do.  They will call the office and give us an update.

## 2024-01-18 NOTE — PROGRESS NOTES
[facility]  [address]  [facility] [address]  Dept: [phone]  Dept Fax: [phone]    SUBJECTIVE     Tera Andre is a 88 y.o.male    Chief Complaint   Patient presents with    Follow-up     Patient in office today for follow up. Last seen in December. States still having some Vertigo episodes, once in a while, not so much dizziness, more balance.        Chief complaint, [place], and all pertinent details of the case reviewed with the resident.    Please see resident's note for specific details discussed at today's visit.    Patient Active Problem List   Diagnosis    Essential hypertension    Irregular heart beat    Hypothyroidism (Dr. Machado)    Hyperlipidemia    Testosterone deficiency ([facility]. [facility])     DAMASO on CPAP    Coronary artery disease involving native coronary artery of native heart without angina pectoris- (Dr. Barahona)    Gastroesophageal reflux disease    Osteoarthritis of thoracolumbar spine    COPD (chronic obstructive pulmonary disease) ([facility])    Osteopenia    BPPV (benign paroxysmal positional vertigo), left       Current Outpatient Medications   Medication Sig Dispense Refill    benzonatate (TESSALON) 100 MG capsule Take 1 capsule by mouth 3 times daily as needed for Cough 30 capsule 0    losartan (COZAAR) 100 MG tablet Take 1 tablet by mouth daily 30 tablet 1    naproxen (NAPROSYN) 500 MG tablet Take 1 tablet by mouth daily 90 tablet 3    atorvastatin (LIPITOR) 80 MG tablet Take 1 tablet by mouth daily 90 tablet 3    sotalol (BETAPACE) 80 MG tablet Take 1 tablet by mouth 2 times daily      Ascorbic Acid (VITAMIN C) 1000 MG tablet Take 1 tablet by mouth daily      Denosumab (PROLIA SC) Inject into the skin Every 6 months      ciclopirox (PENLAC) 8 % solution       Cholecalciferol (VITAMIN D3) 2000 units CAPS Take 1 capsule by mouth daily      ipratropium-albuterol (DUONEB) 0.5-2.5 (3) MG/3ML SOLN nebulizer solution Inhale 3 mLs into the 
Polypharmacy and Deprescribing Pharmacy Medication Review      After pharmacist chart review, the following medications are eligible for deprescribing (due to adverse drug reactions, patient non-adherence, no clear indication, lack of benefit, etc.):  Patient with history of vertigo including feelings of lightheadedness, dizziness, and nausea. Last BP below goal 118/74 (1/17/24). Last BMP with mild hyponatremia with sodium of 134 and previous levels of 133, 134 in earlier months. Consider discontinuation of hydrochlorothiazide 12.5 mg daily as medication has potential to cause hyponatremia and potentially exacerbate symptoms described by patient as above.  Patient currently prescribed naproxen 500 mg daily scheduled for osteoarthritis. NSAIDs listed as potentially inappropriate medications to be avoided for chronic use in patients 65 years and older. Recommend adjusting frequency to as needed dosing or trialing non-NSAID therapies such as lidocaine patches, scheduled APAP, or topical diclofenac gel.     Merry Keenan RPh  1/18/2024 4:13 PM     For Pharmacy Admin Tracking Only    Program: Medical Group  CPA in place:  No  Recommendation Provided To: Provider: 2 via Note to Provider and Verbally to provider  Intervention Detail: Discontinued Rx: 2, reason: JUNIOR and Dose Adjustment: 1, reason: Therapy De-escalation  Intervention Accepted By: Provider: 1  Gap Closed?: No   Time Spent (min): 20            
   COVID-19, MODERNA Bivalent, (age 12y+), IM, 50 mcg/0.5 mL 10/03/2022    COVID-19, MODERNA, (2023-24 formula), (age 12y+), IM, 50mcg/0.5mL 11/27/2023    COVID-19, US Vaccine, Vaccine Unspecified 01/19/2021, 02/18/2021, 10/25/2021, 05/20/2022    Influenza 08/26/2015    Influenza Virus Vaccine 11/01/2011, 10/01/2012, 10/01/2013, 09/23/2014, 10/01/2015, 10/11/2017, 10/03/2022, 10/11/2023, 10/18/2023    Influenza Whole 10/01/2010    Influenza, AFLURIA (age 3 yrs+), FLUZONE, (age 6 mo+), MDV, 0.5mL 10/13/2016    Influenza, FLUAD, (age 65 y+), Adjuvanted, 0.5mL 10/11/2023    Influenza, High Dose (Fluzone 65 yrs and older) 10/11/2017, 10/09/2018, 10/15/2020, 10/01/2021, 10/03/2022    Pneumococcal, PCV-13, PREVNAR 13, (age 6w+), IM, 0.5mL 05/18/2015    Pneumococcal, PPSV23, PNEUMOVAX 23, (age 2y+), SC/IM, 0.5mL 10/01/2005    TDaP, ADACEL (age 10y-64y), BOOSTRIX (age 10y+), IM, 0.5mL 10/13/2016, 04/19/2017    Td, unspecified formulation 07/20/2012    Zoster Live (Zostavax) 04/30/2012    Zoster Recombinant (Shingrix) 10/28/2019, 05/07/2020       There are no preventive care reminders to display for this patient.    Food Insecurity: Not on file (9/6/2023)       Assessment / Plan:      Diagnosis Orders   1. Essential hypertension  losartan (COZAAR) 100 MG tablet      2. BPPV (benign paroxysmal positional vertigo), left        3. Acute cough  benzonatate (TESSALON) 100 MG capsule      4. Coronary artery disease involving native coronary artery of native heart without angina pectoris  losartan (COZAAR) 100 MG tablet        At this time, continue to follow with chiropractor regarding Epley maneuvers at this time.  Continue to follow-up with ENT  We will increase losartan to 100 mg daily and stop hydrochlorothiazide due to side effect profile that could be exacerbating patient's symptoms  Check blood pressures at home and alert us if it is above 150/90  Send Tessalon Perles for acute cough  Follow-up in 1 month for blood pressure

## 2024-01-31 NOTE — TELEPHONE ENCOUNTER
Patient's wife returned my call. She said after talking to PCP they do not want to pursue having the crystals broken up or the referral to [facility]. They will just keep the scheduled follow up. Told patient's wife that is fine, I will document that in the chart and advised her to call with any further questions or concerns.  Patient's wife verbalized understanding and thanked me.

## 2024-01-31 NOTE — TELEPHONE ENCOUNTER
Attempted to call patient's wife. Got voicemail, left message to call the office.   Just want to check on how patient is doing and if they have discussed the referral to [facility].

## 2024-02-24 ASSESSMENT — PATIENT HEALTH QUESTIONNAIRE - PHQ9
1. LITTLE INTEREST OR PLEASURE IN DOING THINGS: 0
SUM OF ALL RESPONSES TO PHQ9 QUESTIONS 1 & 2: 0
1. LITTLE INTEREST OR PLEASURE IN DOING THINGS: NOT AT ALL
SUM OF ALL RESPONSES TO PHQ QUESTIONS 1-9: 0
2. FEELING DOWN, DEPRESSED OR HOPELESS: NOT AT ALL
SUM OF ALL RESPONSES TO PHQ QUESTIONS 1-9: 0
SUM OF ALL RESPONSES TO PHQ9 QUESTIONS 1 & 2: 0
SUM OF ALL RESPONSES TO PHQ QUESTIONS 1-9: 0
2. FEELING DOWN, DEPRESSED OR HOPELESS: 0
SUM OF ALL RESPONSES TO PHQ QUESTIONS 1-9: 0

## 2024-02-27 ENCOUNTER — OFFICE VISIT (OUTPATIENT)
Dept: FAMILY MEDICINE CLINIC | Age: 89
End: 2024-02-27
Payer: MEDICARE

## 2024-02-27 VITALS
SYSTOLIC BLOOD PRESSURE: 120 MMHG | BODY MASS INDEX: 29.03 KG/M2 | HEART RATE: 70 BPM | RESPIRATION RATE: 18 BRPM | OXYGEN SATURATION: 94 % | TEMPERATURE: 97.4 F | HEIGHT: 67 IN | WEIGHT: 185 LBS | DIASTOLIC BLOOD PRESSURE: 80 MMHG

## 2024-02-27 DIAGNOSIS — R05.1 ACUTE COUGH: Primary | ICD-10-CM

## 2024-02-27 DIAGNOSIS — I25.10 CORONARY ARTERY DISEASE INVOLVING NATIVE CORONARY ARTERY OF NATIVE HEART WITHOUT ANGINA PECTORIS: Chronic | ICD-10-CM

## 2024-02-27 DIAGNOSIS — I10 ESSENTIAL HYPERTENSION: ICD-10-CM

## 2024-02-27 LAB
INFLUENZA VIRUS A RNA: NORMAL
INFLUENZA VIRUS B RNA: NORMAL
Lab: NORMAL
QC PASS/FAIL: NORMAL
SARS-COV-2 RDRP RESP QL NAA+PROBE: NEGATIVE

## 2024-02-27 PROCEDURE — G8417 CALC BMI ABV UP PARAM F/U: HCPCS

## 2024-02-27 PROCEDURE — 87635 SARS-COV-2 COVID-19 AMP PRB: CPT

## 2024-02-27 PROCEDURE — 1036F TOBACCO NON-USER: CPT

## 2024-02-27 PROCEDURE — G8427 DOCREV CUR MEDS BY ELIG CLIN: HCPCS

## 2024-02-27 PROCEDURE — 87502 INFLUENZA DNA AMP PROBE: CPT

## 2024-02-27 PROCEDURE — G8484 FLU IMMUNIZE NO ADMIN: HCPCS

## 2024-02-27 PROCEDURE — 99213 OFFICE O/P EST LOW 20 MIN: CPT

## 2024-02-27 PROCEDURE — 1123F ACP DISCUSS/DSCN MKR DOCD: CPT

## 2024-02-27 RX ORDER — BENZONATATE 100 MG/1
100 CAPSULE ORAL 3 TIMES DAILY PRN
Qty: 90 CAPSULE | Refills: 1 | Status: SHIPPED | OUTPATIENT
Start: 2024-02-27 | End: 2024-03-04 | Stop reason: SDUPTHER

## 2024-02-27 RX ORDER — LOSARTAN POTASSIUM 100 MG/1
100 TABLET ORAL DAILY
Qty: 90 TABLET | Refills: 1 | Status: SHIPPED | OUTPATIENT
Start: 2024-02-27

## 2024-02-27 NOTE — PROGRESS NOTES
I reviewed with the resident the medical history and the resident's findings on the physical examination.  I discussed with the resident the patient's diagnosis and concur with the plan. GE Modifier added.  
S: 88 y.o. male with   Chief Complaint   Patient presents with    Cough    Congestion       HPI: please see resident note for HPI and ROS.  87 yo male with PMHx BCC, CAD, COPD, GERD, hld, HTN, hypothyroidism, LISA, DAMASO here for follow up of HTN and also having cough/congestion the past 2 days. Denies fevers, chills, systemic symptoms. BP stable at home and normal in office today, denies symptoms.     BP Readings from Last 3 Encounters:   02/27/24 120/80   01/18/24 130/82   01/17/24 118/74     Wt Readings from Last 3 Encounters:   02/27/24 83.9 kg (185 lb)   01/18/24 83 kg (183 lb)   01/17/24 83 kg (183 lb)       O: VS:  height is 1.702 m (5' 7\") and weight is 83.9 kg (185 lb). His temporal temperature is 97.4 °F (36.3 °C). His blood pressure is 120/80 and his pulse is 70. His respiration is 18 and oxygen saturation is 94%.        Diagnosis Orders   1. Acute cough  POCT COVID-19 Rapid, NAAT    POCT Influenza A/B DNA (Alere i)    benzonatate (TESSALON) 100 MG capsule      2. Essential hypertension  losartan (COZAAR) 100 MG tablet      3. Coronary artery disease involving native coronary artery of native heart without angina pectoris  losartan (COZAAR) 100 MG tablet          Plan:  Please refer to resident note for full plan.  Viral URI  COVID/flu negative  Sent tessalon perles  Continue other conservative mgmt at home and gave return precautions  HTN  Stable, refill losartan       No follow-ups on file.    There are no preventive care reminders to display for this patient.  I have discussed the case, including pertinent history and exam findings with the resident and attending physician.   I agree with the documented assessment and plan as documented by the resident.      Tammie Lozada DO 2/27/2024 3:02 PM   
There are no preventive care reminders to display for this patient.    
10/01/2010    Influenza, AFLURIA (age 3 yrs+), FLUZONE, (age 6 mo+), MDV, 0.5mL 10/13/2016    Influenza, FLUAD, (age 65 y+), Adjuvanted, 0.5mL 10/11/2023    Influenza, High Dose (Fluzone 65 yrs and older) 10/11/2017, 10/09/2018, 10/15/2020, 10/01/2021, 10/03/2022    Pneumococcal, PCV-13, PREVNAR 13, (age 6w+), IM, 0.5mL 05/18/2015    Pneumococcal, PPSV23, PNEUMOVAX 23, (age 2y+), SC/IM, 0.5mL 10/01/2005    RSV, AREXVY, (age 60y+), PF, IM, 0.5mL 11/20/2023    TDaP, ADACEL (age 10y-64y), BOOSTRIX (age 10y+), IM, 0.5mL 10/13/2016, 04/19/2017    Td, unspecified formulation 07/20/2012    Zoster Live (Zostavax) 04/30/2012    Zoster Recombinant (Shingrix) 10/28/2019, 05/07/2020       There are no preventive care reminders to display for this patient.    Food Insecurity: Not on file (9/6/2023)       Assessment / Plan:      Diagnosis Orders   1. Acute cough  POCT COVID-19 Rapid, NAAT    POCT Influenza A/B DNA (Alere i)    benzonatate (TESSALON) 100 MG capsule      2. Essential hypertension  losartan (COZAAR) 100 MG tablet      3. Coronary artery disease involving native coronary artery of native heart without angina pectoris  losartan (COZAAR) 100 MG tablet        COVID and flu negative in office today.  Send Tessalon Perles  Hypertension stable, continue losartan 100 mg  Follow-up in May as previously scheduled         No follow-ups on file.          Medications Prescribed:  Orders Placed This Encounter   Medications    benzonatate (TESSALON) 100 MG capsule     Sig: Take 1 capsule by mouth 3 times daily as needed for Cough     Dispense:  90 capsule     Refill:  1    losartan (COZAAR) 100 MG tablet     Sig: Take 1 tablet by mouth daily     Dispense:  90 tablet     Refill:  1       Future Appointments   Date Time Provider Department Center   3/13/2024  1:30 PM Keenan Wilson PA [facility]   5/2/2024  1:40 PM Yesenia Angel MD [facility]   12/5/2024  9:30 AM [facility] 
Exam  Constitutional:       Appearance: Normal appearance. He is normal weight.   Eyes:      Extraocular Movements: Extraocular movements intact.      Pupils: Pupils are equal, round, and reactive to light.   Cardiovascular:      Rate and Rhythm: Normal rate and regular rhythm.      Pulses: Normal pulses.      Heart sounds: Normal heart sounds. No murmur heard.     No friction rub. No gallop.   Pulmonary:      Effort: Pulmonary effort is normal. No respiratory distress.      Breath sounds: Normal breath sounds. No stridor. No wheezing, rhonchi or rales.   Chest:      Chest wall: No tenderness.   Abdominal:      General: Abdomen is flat. Bowel sounds are normal. There is no distension.      Palpations: Abdomen is soft. There is no mass.      Tenderness: There is no abdominal tenderness. There is no guarding or rebound.      Hernia: No hernia is present.   Musculoskeletal:         General: Normal range of motion.   Skin:     General: Skin is warm and dry.      Capillary Refill: Capillary refill takes less than 2 seconds.   Neurological:      General: No focal deficit present.      Mental Status: He is alert and oriented to person, place, and time. Mental status is at baseline.      Cranial Nerves: No cranial nerve deficit.   Psychiatric:         Mood and Affect: Mood normal.       Results & Diagnostics   I have reviewed: active problem list, medication list, allergies, family history, social history    Most Recent Labs:  Lab Results   Component Value Date    WBC 4.6 (L) 12/07/2023    HGB 12.1 (L) 12/07/2023    HCT 36.6 (L) 12/07/2023     12/07/2023    CHOL 96 04/12/2022    TRIG 97 04/12/2022    HDL 30 06/13/2023    LDLDIRECT 60 05/29/2020    ALT 21 06/13/2023    AST 26 06/13/2023     (L) 12/07/2023    CL 97 (L) 12/07/2023    K 4.6 12/07/2023    CREATININE 0.8 12/07/2023    BUN 14 12/07/2023    CO2 29 12/07/2023    TSH 0.024 (L) 06/07/2022    INR 1.08 10/31/2016    GLUF 93 06/13/2023    LABA1C 5.2

## 2024-03-04 ENCOUNTER — HOSPITAL ENCOUNTER (OUTPATIENT)
Age: 89
Discharge: HOME | End: 2024-03-04
Payer: MEDICARE

## 2024-03-04 ENCOUNTER — HOSPITAL ENCOUNTER (OUTPATIENT)
Dept: GENERAL RADIOLOGY | Age: 89
Discharge: HOME | End: 2024-03-04
Payer: MEDICARE

## 2024-03-04 ENCOUNTER — OFFICE VISIT (OUTPATIENT)
Dept: FAMILY MEDICINE CLINIC | Age: 89
End: 2024-03-04
Payer: MEDICARE

## 2024-03-04 VITALS
TEMPERATURE: 98.1 F | OXYGEN SATURATION: 92 % | DIASTOLIC BLOOD PRESSURE: 86 MMHG | SYSTOLIC BLOOD PRESSURE: 132 MMHG | HEIGHT: 67 IN | HEART RATE: 86 BPM | BODY MASS INDEX: 28.79 KG/M2 | RESPIRATION RATE: 16 BRPM | WEIGHT: 183.4 LBS

## 2024-03-04 DIAGNOSIS — R05.1 ACUTE COUGH: ICD-10-CM

## 2024-03-04 DIAGNOSIS — R05.8 POST-VIRAL COUGH SYNDROME: ICD-10-CM

## 2024-03-04 DIAGNOSIS — J44.9 CHRONIC OBSTRUCTIVE PULMONARY DISEASE, UNSPECIFIED COPD TYPE (HCC): ICD-10-CM

## 2024-03-04 DIAGNOSIS — R05.1 ACUTE COUGH: Primary | ICD-10-CM

## 2024-03-04 DIAGNOSIS — J01.00 ACUTE MAXILLARY SINUSITIS, RECURRENCE NOT SPECIFIED: ICD-10-CM

## 2024-03-04 PROCEDURE — G8484 FLU IMMUNIZE NO ADMIN: HCPCS

## 2024-03-04 PROCEDURE — G8427 DOCREV CUR MEDS BY ELIG CLIN: HCPCS

## 2024-03-04 PROCEDURE — 99213 OFFICE O/P EST LOW 20 MIN: CPT

## 2024-03-04 PROCEDURE — 1036F TOBACCO NON-USER: CPT

## 2024-03-04 PROCEDURE — 71046 X-RAY EXAM CHEST 2 VIEWS: CPT

## 2024-03-04 PROCEDURE — G8417 CALC BMI ABV UP PARAM F/U: HCPCS

## 2024-03-04 PROCEDURE — 3023F SPIROM DOC REV: CPT

## 2024-03-04 PROCEDURE — 1123F ACP DISCUSS/DSCN MKR DOCD: CPT

## 2024-03-04 RX ORDER — AZITHROMYCIN 250 MG/1
TABLET, FILM COATED ORAL
Qty: 6 TABLET | Refills: 0 | Status: SHIPPED | OUTPATIENT
Start: 2024-03-04 | End: 2024-03-14

## 2024-03-04 RX ORDER — FLUTICASONE PROPIONATE 50 MCG
2 SPRAY, SUSPENSION (ML) NASAL DAILY
Qty: 16 G | Refills: 0 | Status: SHIPPED | OUTPATIENT
Start: 2024-03-04

## 2024-03-04 RX ORDER — BENZONATATE 200 MG/1
200 CAPSULE ORAL 3 TIMES DAILY PRN
Qty: 90 CAPSULE | Refills: 1 | Status: SHIPPED | OUTPATIENT
Start: 2024-03-04 | End: 2024-05-03

## 2024-03-04 RX ORDER — BUDESONIDE AND FORMOTEROL FUMARATE DIHYDRATE 160; 4.5 UG/1; UG/1
2 AEROSOL RESPIRATORY (INHALATION) 2 TIMES DAILY
Qty: 1 EACH | Refills: 3 | Status: SHIPPED | OUTPATIENT
Start: 2024-03-04

## 2024-03-04 RX ORDER — GUAIFENESIN/DEXTROMETHORPHAN 100-10MG/5
10 SYRUP ORAL 3 TIMES DAILY PRN
Qty: 120 ML | Refills: 0 | Status: SHIPPED | OUTPATIENT
Start: 2024-03-04 | End: 2024-03-14

## 2024-03-04 RX ORDER — ALBUTEROL SULFATE 90 UG/1
2 AEROSOL, METERED RESPIRATORY (INHALATION) EVERY 6 HOURS PRN
Qty: 18 G | Refills: 1 | Status: SHIPPED | OUTPATIENT
Start: 2024-03-04

## 2024-03-04 NOTE — PATIENT INSTRUCTIONS
Thank you   Thank you for trusting us with your healthcare needs. You may receive a survey regarding today's visit. It would help us out if you would take a few moments to provide your feedback. We value your input.  Please bring in ALL medications BOTTLES, including inhalers, herbal supplements, over the counter, prescribed & non-prescribed medicine. The office would like actual medication bottles and a list.         4.  Prior to getting your labs drawn, check with your insurance company for benefits and eligibility of lab services.  Often, insurance companies cover certain tests for preventative visits only.  It is patient's responsibility to    see what is covered.    5.  If the list below has been completed, PLEASE FAX RECORDS TO OUR OFFICE @ [phone]. Once the records have been received we will update your records at our office:  There are no preventive care reminders to display for this patient.

## 2024-03-05 ENCOUNTER — TELEPHONE (OUTPATIENT)
Dept: FAMILY MEDICINE CLINIC | Age: 89
End: 2024-03-05

## 2024-03-05 ASSESSMENT — ENCOUNTER SYMPTOMS
SHORTNESS OF BREATH: 0
CONSTIPATION: 0
SINUS PRESSURE: 1
CHEST TIGHTNESS: 0
VOICE CHANGE: 0
COUGH: 1
CHOKING: 0
WHEEZING: 0
SORE THROAT: 0
SINUS PAIN: 1
TROUBLE SWALLOWING: 0
NAUSEA: 0
ABDOMINAL PAIN: 0
VOMITING: 0
STRIDOR: 0
DIARRHEA: 0

## 2024-03-05 NOTE — TELEPHONE ENCOUNTER
----- Message from Felix Vazquez MD sent at 3/4/2024  5:48 PM EST -----  Xray reviewed.  No pneumonia .  Noted COPD lung disease noted.  Continue follow with pulm

## 2024-03-06 NOTE — PROGRESS NOTES
S: 88 y.o. male with   Chief Complaint   Patient presents with    Cough     Congestion and Dizziness for 1 week saw Dr. Parsons 02/27 and not getting any better.        HPI: please see resident note for HPI and ROS.    BP Readings from Last 3 Encounters:   03/04/24 132/86   02/27/24 120/80   01/18/24 130/82     Wt Readings from Last 3 Encounters:   03/04/24 83.2 kg (183 lb 6.4 oz)   02/27/24 83.9 kg (185 lb)   01/18/24 83 kg (183 lb)       O: VS:  height is 1.702 m (5' 7.01\") and weight is 83.2 kg (183 lb 6.4 oz). His oral temperature is 98.1 °F (36.7 °C). His blood pressure is 132/86 and his pulse is 86. His respiration is 16 and oxygen saturation is 92%.   Physical exam performed by resident physician     Diagnosis Orders   1. Acute cough  benzonatate (TESSALON) 200 MG capsule    guaiFENesin-dextromethorphan (ROBITUSSIN DM) 100-10 MG/5ML syrup    XR CHEST STANDARD (2 VW)    fluticasone (FLONASE) 50 MCG/ACT nasal spray      2. Chronic obstructive pulmonary disease, unspecified COPD type (HCC)  albuterol sulfate HFA (PROVENTIL;VENTOLIN;PROAIR) 108 (90 Base) MCG/ACT inhaler    budesonide-formoterol (SYMBICORT) 160-4.5 MCG/ACT AERO    XR CHEST STANDARD (2 VW)      3. Post-viral cough syndrome  XR CHEST STANDARD (2 VW)    fluticasone (FLONASE) 50 MCG/ACT nasal spray      4. Acute maxillary sinusitis, recurrence not specified  azithromycin (ZITHROMAX) 250 MG tablet    fluticasone (FLONASE) 50 MCG/ACT nasal spray          Plan:  Please refer to resident note for full plan.    88 year old male presents to the office to follow up on follow-up respiratory tract infection.  Patient has had worsening symptoms since being diagnosed with a viral upper respiratory tract infection about a week ago.  Has had lingering cough with worsening congestion and facial pressure.  Concern for coexisting sinusitis also has a history of COPD uncontrolled.  Will plan to restart daily Symbicort, Tessalon Perles, azithromycin, Flonase, Mucinex as 
There are no preventive care reminders to display for this patient.    
appearance. He is normal weight. He is not ill-appearing or diaphoretic.   HENT:      Head: Normocephalic and atraumatic.      Nose: Nose normal. No congestion or rhinorrhea.      Mouth/Throat:      Mouth: Mucous membranes are moist.      Pharynx: Oropharynx is clear. No oropharyngeal exudate or posterior oropharyngeal erythema.   Eyes:      General: No scleral icterus.        Right eye: No discharge.         Left eye: No discharge.      Extraocular Movements: Extraocular movements intact.      Conjunctiva/sclera: Conjunctivae normal.      Pupils: Pupils are equal, round, and reactive to light.   Cardiovascular:      Rate and Rhythm: Normal rate. Rhythm irregular.      Pulses: Normal pulses.      Heart sounds: Normal heart sounds. No murmur heard.     No friction rub. No gallop.   Pulmonary:      Effort: Pulmonary effort is normal. No tachypnea, bradypnea, accessory muscle usage, prolonged expiration, respiratory distress or retractions.      Breath sounds: Decreased air movement present. No stridor. Decreased breath sounds present. No wheezing, rhonchi or rales.   Chest:      Chest wall: No tenderness.   Abdominal:      General: Abdomen is flat. Bowel sounds are normal. There is no distension.      Palpations: Abdomen is soft.      Tenderness: There is no abdominal tenderness. There is no guarding or rebound.   Musculoskeletal:         General: No swelling, tenderness or signs of injury.      Right lower leg: No edema.      Left lower leg: No edema.   Skin:     General: Skin is warm and dry.      Capillary Refill: Capillary refill takes less than 2 seconds.      Coloration: Skin is not jaundiced or pale.      Findings: No erythema, lesion or rash.   Neurological:      General: No focal deficit present.      Mental Status: He is alert. Mental status is at baseline.      Cranial Nerves: No cranial nerve deficit.      Sensory: No sensory deficit.      Motor: No weakness.   Psychiatric:         Mood and Affect: Mood

## 2024-03-13 ENCOUNTER — OFFICE VISIT (OUTPATIENT)
Dept: ENT CLINIC | Age: 89
End: 2024-03-13
Payer: MEDICARE

## 2024-03-13 VITALS
HEIGHT: 67 IN | DIASTOLIC BLOOD PRESSURE: 66 MMHG | WEIGHT: 185.7 LBS | HEART RATE: 69 BPM | TEMPERATURE: 96.6 F | BODY MASS INDEX: 29.15 KG/M2 | RESPIRATION RATE: 18 BRPM | OXYGEN SATURATION: 96 % | SYSTOLIC BLOOD PRESSURE: 138 MMHG

## 2024-03-13 DIAGNOSIS — H81.10 BENIGN PAROXYSMAL POSITIONAL VERTIGO, UNSPECIFIED LATERALITY: Primary | ICD-10-CM

## 2024-03-13 DIAGNOSIS — H61.23 HEARING LOSS OF BOTH EARS DUE TO CERUMEN IMPACTION: ICD-10-CM

## 2024-03-13 DIAGNOSIS — Z97.4 WEARS HEARING AID IN BOTH EARS: ICD-10-CM

## 2024-03-13 PROCEDURE — G8484 FLU IMMUNIZE NO ADMIN: HCPCS | Performed by: PHYSICIAN ASSISTANT

## 2024-03-13 PROCEDURE — 1036F TOBACCO NON-USER: CPT | Performed by: PHYSICIAN ASSISTANT

## 2024-03-13 PROCEDURE — 1123F ACP DISCUSS/DSCN MKR DOCD: CPT | Performed by: PHYSICIAN ASSISTANT

## 2024-03-13 PROCEDURE — 69210 REMOVE IMPACTED EAR WAX UNI: CPT | Performed by: PHYSICIAN ASSISTANT

## 2024-03-13 PROCEDURE — G8417 CALC BMI ABV UP PARAM F/U: HCPCS | Performed by: PHYSICIAN ASSISTANT

## 2024-03-13 PROCEDURE — G8427 DOCREV CUR MEDS BY ELIG CLIN: HCPCS | Performed by: PHYSICIAN ASSISTANT

## 2024-03-13 PROCEDURE — 99213 OFFICE O/P EST LOW 20 MIN: CPT | Performed by: PHYSICIAN ASSISTANT

## 2024-03-13 NOTE — PROGRESS NOTES
may have been completed with a voice recognition program.  Efforts were made to edit the dictation but occasionally words are mis-transcribed.)    Electronically signed by ZHAO Esposito on 3/13/2024 at 4:43 PM

## 2024-05-02 ENCOUNTER — OFFICE VISIT (OUTPATIENT)
Dept: FAMILY MEDICINE CLINIC | Age: 89
End: 2024-05-02

## 2024-05-02 VITALS
WEIGHT: 186.8 LBS | HEIGHT: 67 IN | RESPIRATION RATE: 21 BRPM | BODY MASS INDEX: 29.32 KG/M2 | SYSTOLIC BLOOD PRESSURE: 130 MMHG | TEMPERATURE: 97.7 F | DIASTOLIC BLOOD PRESSURE: 86 MMHG | HEART RATE: 67 BPM | OXYGEN SATURATION: 95 %

## 2024-05-02 DIAGNOSIS — H81.12 BPPV (BENIGN PAROXYSMAL POSITIONAL VERTIGO), LEFT: ICD-10-CM

## 2024-05-02 DIAGNOSIS — E03.9 HYPOTHYROIDISM, UNSPECIFIED TYPE: ICD-10-CM

## 2024-05-02 DIAGNOSIS — I25.10 CORONARY ARTERY DISEASE INVOLVING NATIVE CORONARY ARTERY OF NATIVE HEART WITHOUT ANGINA PECTORIS: ICD-10-CM

## 2024-05-02 DIAGNOSIS — J44.9 CHRONIC OBSTRUCTIVE PULMONARY DISEASE, UNSPECIFIED COPD TYPE (HCC): ICD-10-CM

## 2024-05-02 DIAGNOSIS — J30.1 SEASONAL ALLERGIC RHINITIS DUE TO POLLEN: ICD-10-CM

## 2024-05-02 DIAGNOSIS — E78.5 HYPERLIPIDEMIA, UNSPECIFIED HYPERLIPIDEMIA TYPE: ICD-10-CM

## 2024-05-02 DIAGNOSIS — K21.9 GASTROESOPHAGEAL REFLUX DISEASE, UNSPECIFIED WHETHER ESOPHAGITIS PRESENT: ICD-10-CM

## 2024-05-02 DIAGNOSIS — I10 ESSENTIAL HYPERTENSION: Primary | ICD-10-CM

## 2024-05-02 RX ORDER — FLUTICASONE PROPIONATE 50 MCG
2 SPRAY, SUSPENSION (ML) NASAL DAILY
Qty: 16 G | Refills: 0 | Status: SHIPPED | OUTPATIENT
Start: 2024-05-02

## 2024-05-02 ASSESSMENT — ENCOUNTER SYMPTOMS
VOMITING: 0
SHORTNESS OF BREATH: 0
CONSTIPATION: 0
SORE THROAT: 1
ABDOMINAL PAIN: 0
SINUS PRESSURE: 0
SINUS PAIN: 0
NAUSEA: 0
COUGH: 0
DIARRHEA: 0

## 2024-05-02 NOTE — PROGRESS NOTES
S: 88 y.o. male with   Chief Complaint   Patient presents with    6 Month Follow-Up     Still feels off balance. Has a cough & runny nose. Home bp 177/80, this morning.       HPI: please see resident note for HPI and ROS.    BP Readings from Last 3 Encounters:   05/02/24 130/86   03/13/24 138/66   03/04/24 132/86     Wt Readings from Last 3 Encounters:   05/02/24 84.7 kg (186 lb 12.8 oz)   03/13/24 84.2 kg (185 lb 11.2 oz)   03/04/24 83.2 kg (183 lb 6.4 oz)       O: VS:  height is 1.702 m (5' 7\") and weight is 84.7 kg (186 lb 12.8 oz). His oral temperature is 97.7 °F (36.5 °C). His blood pressure is 130/86 and his pulse is 67. His respiration is 21 and oxygen saturation is 95%.   AAO/NAD, appropriate affect for mood     Diagnosis Orders   1. Essential hypertension  CBC with Auto Differential    Comprehensive Metabolic Panel      2. BPPV (benign paroxysmal positional vertigo), left        3. Seasonal allergic rhinitis due to pollen  fluticasone (FLONASE) 50 MCG/ACT nasal spray      4. Hyperlipidemia, unspecified hyperlipidemia type  Lipid Panel      5. Hypothyroidism, unspecified type        6. Coronary artery disease involving native coronary artery of native heart without angina pectoris        7. Chronic obstructive pulmonary disease, unspecified COPD type (HCC)        8. Gastroesophageal reflux disease, unspecified whether esophagitis present            Plan:  Please refer to resident note for full plan.    88-year-old male here for follow up. History of CAD with stents, follows with cardiology at [facility]. Taking atorvastatin for hyperlipidemia as well. [facility]; currently taking levothyroxine 75 mcg daily. History of BPPV; encouraged home exercises. Could consider vestibular rehab if symptoms worsen. Medication refills as able. Labs ordered as above. Follow up in 6 months for AWV or sooner if needed.     There are no preventive care reminders to display for this 
There are no preventive care reminders to display for this patient.      
Refill: 0    4. Hyperlipidemia, unspecified hyperlipidemia type  Chronic, controlled.  Continue current dose of Lipitor.  Recheck labs.  Consider dose de-escalation in the future  - Lipid Panel; Future    5. Hypothyroidism, unspecified type  Chronic, stable.  Follow-up with endocrinology as scheduled.  Continue current dose Synthroid    6. Coronary artery disease involving native coronary artery of native heart without angina pectoris  Chronic, stable.  Continue current dose aspirin, Lipitor, losartan, Imdur.  Follow-up with cardiology as scheduled    7. Chronic obstructive pulmonary disease, unspecified COPD type (HCC)  Chronic, stable.  Continue current dose Symbicort.  Albuterol as needed.    8.  Gastroesophageal reflux disease, unspecified whether esophagitis present  Chronic, stable.  Not on medications currently.  Patient is having some intermittent difficulty with swallowing currently.  Feel this is most likely related to allergic rhinitis.  However, if symptoms do not improve with treatment of this condition, he is advised to call office and will refer to Dr. Orona for consideration of repeat EGD with dilation           Return in about 6 months (around 2024).          Medications Prescribed:  Orders Placed This Encounter   Medications    fluticasone (FLONASE) 50 MCG/ACT nasal spray     Si sprays by Each Nostril route daily     Dispense:  16 g     Refill:  0       Future Appointments   Date Time Provider Department Center   2024  1:40 PM Libertad Hoang APRN - CNP N ENT [facility]   2024  9:30 AM Sreedhar Hanna MD SRPX [facility]       Patient given educational materials - see patient instructions.  Discussed use, benefit, and sideeffects of prescribed medications.  All patient questions answered.  Pt voiced understanding. Reviewed health maintenance.  Instructed to continue current medications, diet and exercise.  Patient agreed with treatment plan.Follow up as directed.

## 2024-05-13 ENCOUNTER — NURSE ONLY (OUTPATIENT)
Dept: LAB | Age: 89
End: 2024-05-13

## 2024-05-13 DIAGNOSIS — J44.9 CHRONIC OBSTRUCTIVE PULMONARY DISEASE, UNSPECIFIED COPD TYPE (HCC): ICD-10-CM

## 2024-05-13 DIAGNOSIS — E78.5 HYPERLIPIDEMIA, UNSPECIFIED HYPERLIPIDEMIA TYPE: ICD-10-CM

## 2024-05-13 DIAGNOSIS — I10 ESSENTIAL HYPERTENSION: ICD-10-CM

## 2024-05-13 DIAGNOSIS — D64.9 ANEMIA, UNSPECIFIED TYPE: ICD-10-CM

## 2024-05-13 LAB
ALBUMIN SERPL BCG-MCNC: 3.6 G/DL (ref 3.5–5.1)
ALP SERPL-CCNC: 67 U/L (ref 38–126)
ALT SERPL W/O P-5'-P-CCNC: 17 U/L (ref 11–66)
ANION GAP SERPL CALC-SCNC: 11 MEQ/L (ref 8–16)
AST SERPL-CCNC: 22 U/L (ref 5–40)
BASOPHILS ABSOLUTE: 0.1 THOU/MM3 (ref 0–0.1)
BASOPHILS NFR BLD AUTO: 1.2 %
BILIRUB SERPL-MCNC: 0.8 MG/DL (ref 0.3–1.2)
BUN SERPL-MCNC: 14 MG/DL (ref 7–22)
CALCIUM SERPL-MCNC: 9.7 MG/DL (ref 8.5–10.5)
CHLORIDE SERPL-SCNC: 102 MEQ/L (ref 98–111)
CHOLEST SERPL-MCNC: 113 MG/DL (ref 100–199)
CO2 SERPL-SCNC: 26 MEQ/L (ref 23–33)
CREAT SERPL-MCNC: 0.7 MG/DL (ref 0.4–1.2)
DEPRECATED RDW RBC AUTO: 42.6 FL (ref 35–45)
EOSINOPHIL NFR BLD AUTO: 6 %
EOSINOPHILS ABSOLUTE: 0.3 THOU/MM3 (ref 0–0.4)
ERYTHROCYTE [DISTWIDTH] IN BLOOD BY AUTOMATED COUNT: 12.5 % (ref 11.5–14.5)
GFR SERPL CREATININE-BSD FRML MDRD: 89 ML/MIN/1.73M2
GLUCOSE SERPL-MCNC: 86 MG/DL (ref 70–108)
HCT VFR BLD AUTO: 38.6 % (ref 42–52)
HDLC SERPL-MCNC: 28 MG/DL
HGB BLD-MCNC: 12.8 GM/DL (ref 14–18)
IMM GRANULOCYTES # BLD AUTO: 0.01 THOU/MM3 (ref 0–0.07)
IMM GRANULOCYTES NFR BLD AUTO: 0.2 %
LDLC SERPL CALC-MCNC: 63 MG/DL
LYMPHOCYTES ABSOLUTE: 1.6 THOU/MM3 (ref 1–4.8)
LYMPHOCYTES NFR BLD AUTO: 34 %
MAGNESIUM SERPL-MCNC: 2 MG/DL (ref 1.6–2.4)
MCH RBC QN AUTO: 31.1 PG (ref 26–33)
MCHC RBC AUTO-ENTMCNC: 33.2 GM/DL (ref 32.2–35.5)
MCV RBC AUTO: 93.7 FL (ref 80–94)
MONOCYTES ABSOLUTE: 0.6 THOU/MM3 (ref 0.4–1.3)
MONOCYTES NFR BLD AUTO: 13.5 %
NEUTROPHILS ABSOLUTE: 2.2 THOU/MM3 (ref 1.8–7.7)
NEUTROPHILS NFR BLD AUTO: 45.1 %
NRBC BLD AUTO-RTO: 0 /100 WBC
PLATELET # BLD AUTO: 161 THOU/MM3 (ref 130–400)
PMV BLD AUTO: 11.1 FL (ref 9.4–12.4)
POTASSIUM SERPL-SCNC: 4.6 MEQ/L (ref 3.5–5.2)
PROT SERPL-MCNC: 6.9 G/DL (ref 6.1–8)
RBC # BLD AUTO: 4.12 MILL/MM3 (ref 4.7–6.1)
SODIUM SERPL-SCNC: 139 MEQ/L (ref 135–145)
TRIGL SERPL-MCNC: 112 MG/DL (ref 0–199)
WBC # BLD AUTO: 4.8 THOU/MM3 (ref 4.8–10.8)

## 2024-05-30 ENCOUNTER — OFFICE VISIT (OUTPATIENT)
Dept: FAMILY MEDICINE CLINIC | Age: 89
End: 2024-05-30
Payer: MEDICARE

## 2024-05-30 VITALS
DIASTOLIC BLOOD PRESSURE: 80 MMHG | HEART RATE: 60 BPM | BODY MASS INDEX: 29.35 KG/M2 | SYSTOLIC BLOOD PRESSURE: 170 MMHG | OXYGEN SATURATION: 99 % | RESPIRATION RATE: 20 BRPM | HEIGHT: 67 IN | TEMPERATURE: 97.5 F | WEIGHT: 187 LBS

## 2024-05-30 DIAGNOSIS — M47.815 OSTEOARTHRITIS OF THORACOLUMBAR SPINE, UNSPECIFIED SPINAL OSTEOARTHRITIS COMPLICATION STATUS: ICD-10-CM

## 2024-05-30 DIAGNOSIS — I25.10 CORONARY ARTERY DISEASE INVOLVING NATIVE CORONARY ARTERY OF NATIVE HEART WITHOUT ANGINA PECTORIS: Chronic | ICD-10-CM

## 2024-05-30 DIAGNOSIS — I10 ESSENTIAL HYPERTENSION: Primary | ICD-10-CM

## 2024-05-30 DIAGNOSIS — E78.5 HYPERLIPIDEMIA, UNSPECIFIED HYPERLIPIDEMIA TYPE: ICD-10-CM

## 2024-05-30 PROCEDURE — 99214 OFFICE O/P EST MOD 30 MIN: CPT

## 2024-05-30 PROCEDURE — G8427 DOCREV CUR MEDS BY ELIG CLIN: HCPCS

## 2024-05-30 PROCEDURE — 1036F TOBACCO NON-USER: CPT

## 2024-05-30 PROCEDURE — G8417 CALC BMI ABV UP PARAM F/U: HCPCS

## 2024-05-30 PROCEDURE — 1123F ACP DISCUSS/DSCN MKR DOCD: CPT

## 2024-05-30 RX ORDER — BENZONATATE 100 MG/1
100 CAPSULE ORAL 3 TIMES DAILY PRN
Qty: 90 CAPSULE | Refills: 2 | Status: SHIPPED | OUTPATIENT
Start: 2024-05-30

## 2024-05-30 RX ORDER — LOSARTAN POTASSIUM 100 MG/1
100 TABLET ORAL DAILY
Qty: 90 TABLET | Refills: 1 | Status: SHIPPED | OUTPATIENT
Start: 2024-05-30

## 2024-05-30 RX ORDER — AMLODIPINE BESYLATE 5 MG/1
5 TABLET ORAL DAILY
Qty: 90 TABLET | Refills: 1 | Status: CANCELLED | OUTPATIENT
Start: 2024-05-30

## 2024-05-30 RX ORDER — MECLIZINE HYDROCHLORIDE 25 MG/1
25 TABLET ORAL 3 TIMES DAILY PRN
COMMUNITY

## 2024-05-30 RX ORDER — BENZONATATE 100 MG/1
100 CAPSULE ORAL 3 TIMES DAILY PRN
COMMUNITY
End: 2024-05-30 | Stop reason: SDUPTHER

## 2024-05-30 RX ORDER — AMLODIPINE BESYLATE 2.5 MG/1
2.5 TABLET ORAL DAILY
Qty: 30 TABLET | Refills: 1 | Status: SHIPPED | OUTPATIENT
Start: 2024-05-30

## 2024-05-30 RX ORDER — CALCIUM CARBONATE 500(1250)
500 TABLET ORAL DAILY
COMMUNITY

## 2024-05-30 RX ORDER — NAPROXEN 500 MG/1
500 TABLET ORAL DAILY
Qty: 90 TABLET | Refills: 3 | Status: SHIPPED | OUTPATIENT
Start: 2024-05-30

## 2024-05-30 RX ORDER — CICLOPIROX 80 MG/ML
SOLUTION TOPICAL NIGHTLY
COMMUNITY

## 2024-05-30 NOTE — PROGRESS NOTES
Tera Andre is a 88 y.o. male who presents today for:  Chief Complaint   Patient presents with    OTHER     Hypertension, dizzy,will need some refills          HPI:   Tera Andre is 88 y.o. who presents today for follow up.      Patient presents today with concerns of elevated BP. Patients BP was elevated at his endo appointment yesterday which concerned him. Denies headaches. Does have some dizziness but does have vertigo.  He otherwise has no chest pain, shortness of breath.  No other red flag symptoms.    He has been compliant on his blood pressure medicines.    No other concerns today.    Objective:     Vitals:    05/30/24 0938 05/30/24 0941   BP: (!) 178/90 (!) 170/80   Site: Left Upper Arm Right Upper Arm   Position: Sitting Sitting   Pulse: 60    Resp: 20    Temp: 97.5 °F (36.4 °C)    TempSrc: Oral    SpO2: 99%    Weight: 84.8 kg (187 lb)    Height: 1.702 m (5' 7\")        Wt Readings from Last 3 Encounters:   05/30/24 84.8 kg (187 lb)   05/02/24 84.7 kg (186 lb 12.8 oz)   03/13/24 84.2 kg (185 lb 11.2 oz)       BP Readings from Last 3 Encounters:   05/30/24 (!) 170/80   05/02/24 130/86   03/13/24 138/66       Lab Results   Component Value Date    WBC 4.8 05/13/2024    HGB 12.8 (L) 05/13/2024    HCT 38.6 (L) 05/13/2024    MCV 93.7 05/13/2024     05/13/2024     Lab Results   Component Value Date     05/13/2024    K 4.6 05/13/2024     05/13/2024    CO2 26 05/13/2024    BUN 14 05/13/2024    CREATININE 0.7 05/13/2024    GLUCOSE 86 05/13/2024    CALCIUM 9.7 05/13/2024    BILITOT 0.8 05/13/2024    ALKPHOS 67 05/13/2024    AST 22 05/13/2024    ALT 17 05/13/2024    LABGLOM 89 05/13/2024    AGRATIO 1.7 05/29/2020     Lab Results   Component Value Date    TSH 0.024 (L) 06/07/2022    Q0KDSVX 142.41 08/06/2014    T4FREE 1.44 06/07/2022     Lab Results   Component Value Date    LABA1C 5.2 04/12/2022     No results found for: \"EAG\"  Lab Results   Component Value Date    CHOL 113 05/13/2024

## 2024-06-06 NOTE — PROGRESS NOTES
Attending Physician Note    I saw and evaluated the patient, performing the key elements of the service.  I discussed the findings, assessment and plan with the resident and agree with the resident's findings and plan as documented in the resident's note.  GC modifier added.    Brief summary:  HTN, chronic and not controlled - add amlodipine 2.5mg daily.  Continue losartan 100mg daily.  Monitor BP at home.  Follow up one month, sooner if needed

## 2024-06-20 ENCOUNTER — OFFICE VISIT (OUTPATIENT)
Dept: FAMILY MEDICINE CLINIC | Age: 89
End: 2024-06-20

## 2024-06-20 VITALS
SYSTOLIC BLOOD PRESSURE: 140 MMHG | HEIGHT: 67 IN | TEMPERATURE: 97.9 F | RESPIRATION RATE: 18 BRPM | WEIGHT: 182 LBS | HEART RATE: 72 BPM | DIASTOLIC BLOOD PRESSURE: 80 MMHG | OXYGEN SATURATION: 96 % | BODY MASS INDEX: 28.56 KG/M2

## 2024-06-20 DIAGNOSIS — I10 ESSENTIAL HYPERTENSION: Primary | ICD-10-CM

## 2024-06-20 DIAGNOSIS — E78.5 HYPERLIPIDEMIA, UNSPECIFIED HYPERLIPIDEMIA TYPE: ICD-10-CM

## 2024-06-20 RX ORDER — DICLOFENAC SODIUM 30 MG/G
1 GEL TOPICAL
COMMUNITY
Start: 2024-02-13

## 2024-06-20 RX ORDER — AMLODIPINE BESYLATE 2.5 MG/1
2.5 TABLET ORAL DAILY
Qty: 90 TABLET | Refills: 1 | Status: SHIPPED | OUTPATIENT
Start: 2024-06-20

## 2024-06-20 NOTE — PROGRESS NOTES
Tera Andre is a 88 y.o. male who presents today for:  Chief Complaint   Patient presents with    Follow-up     Patient in office today for 3 week follow up. Patient states things have been going pretty good for him. Has blood pressure log to review with Dr. Parsons.          HPI:   Tera Andre is 88 y.o. who presents today for follow up.      Patient presents today for blood pressure follow-up.  Patient presents with blood pressure log that shows blood pressures been running anywhere from 122 150/70-90.  Most blood pressures are averaging in the 130s.  Patient states everything is going well with his new blood pressure medication.  He is not having any side effects such as swelling.  Blood pressure today 140/80.  No low blood pressures.      Objective:     Vitals:    06/20/24 1301   BP: (!) 140/80   Site: Right Upper Arm   Position: Sitting   Cuff Size: Large Adult   Pulse: 72   Resp: 18   Temp: 97.9 °F (36.6 °C)   TempSrc: Oral   SpO2: 96%   Weight: 82.6 kg (182 lb)   Height: 1.702 m (5' 7\")       Wt Readings from Last 3 Encounters:   06/20/24 82.6 kg (182 lb)   05/30/24 84.8 kg (187 lb)   05/02/24 84.7 kg (186 lb 12.8 oz)       BP Readings from Last 3 Encounters:   06/20/24 (!) 140/80   05/30/24 (!) 170/80   05/02/24 130/86       Lab Results   Component Value Date    WBC 4.8 05/13/2024    HGB 12.8 (L) 05/13/2024    HCT 38.6 (L) 05/13/2024    MCV 93.7 05/13/2024     05/13/2024     Lab Results   Component Value Date     05/13/2024    K 4.6 05/13/2024     05/13/2024    CO2 26 05/13/2024    BUN 14 05/13/2024    CREATININE 0.7 05/13/2024    GLUCOSE 86 05/13/2024    CALCIUM 9.7 05/13/2024    BILITOT 0.8 05/13/2024    ALKPHOS 67 05/13/2024    AST 22 05/13/2024    ALT 17 05/13/2024    LABGLOM 89 05/13/2024    AGRATIO 1.7 05/29/2020     Lab Results   Component Value Date    TSH 0.024 (L) 06/07/2022    G1KQCMF 142.41 08/06/2014    T4FREE 1.44 06/07/2022     Lab Results   Component Value Date

## 2024-09-16 ENCOUNTER — OFFICE VISIT (OUTPATIENT)
Dept: ENT CLINIC | Age: 89
End: 2024-09-16
Payer: MEDICARE

## 2024-09-16 VITALS
BODY MASS INDEX: 29.54 KG/M2 | TEMPERATURE: 97.4 F | HEART RATE: 69 BPM | RESPIRATION RATE: 18 BRPM | HEIGHT: 67 IN | DIASTOLIC BLOOD PRESSURE: 64 MMHG | OXYGEN SATURATION: 94 % | SYSTOLIC BLOOD PRESSURE: 110 MMHG | WEIGHT: 188.2 LBS

## 2024-09-16 DIAGNOSIS — H61.23 EXCESSIVE CERUMEN IN BOTH EAR CANALS: ICD-10-CM

## 2024-09-16 DIAGNOSIS — Z97.4 WEARS HEARING AID IN BOTH EARS: ICD-10-CM

## 2024-09-16 DIAGNOSIS — R26.89 IMBALANCE: Primary | ICD-10-CM

## 2024-09-16 PROCEDURE — 99213 OFFICE O/P EST LOW 20 MIN: CPT | Performed by: REGISTERED NURSE

## 2024-09-16 PROCEDURE — G8427 DOCREV CUR MEDS BY ELIG CLIN: HCPCS | Performed by: REGISTERED NURSE

## 2024-09-16 PROCEDURE — 1123F ACP DISCUSS/DSCN MKR DOCD: CPT | Performed by: REGISTERED NURSE

## 2024-09-16 PROCEDURE — G8417 CALC BMI ABV UP PARAM F/U: HCPCS | Performed by: REGISTERED NURSE

## 2024-09-16 PROCEDURE — 1036F TOBACCO NON-USER: CPT | Performed by: REGISTERED NURSE

## 2024-09-16 PROCEDURE — 69210 REMOVE IMPACTED EAR WAX UNI: CPT | Performed by: REGISTERED NURSE

## 2024-09-27 ENCOUNTER — HOSPITAL ENCOUNTER (OUTPATIENT)
Dept: PHYSICAL THERAPY | Age: 89
Setting detail: THERAPIES SERIES
Discharge: HOME | End: 2024-09-27
Payer: MEDICARE

## 2024-09-27 PROCEDURE — 97161 PT EVAL LOW COMPLEX 20 MIN: CPT

## 2024-09-27 PROCEDURE — 95992 CANALITH REPOSITIONING PROC: CPT

## 2024-10-03 ENCOUNTER — HOSPITAL ENCOUNTER (OUTPATIENT)
Dept: PHYSICAL THERAPY | Age: 89
Setting detail: THERAPIES SERIES
Discharge: HOME | End: 2024-10-03
Payer: MEDICARE

## 2024-10-03 PROCEDURE — 97112 NEUROMUSCULAR REEDUCATION: CPT

## 2024-10-03 PROCEDURE — 95992 CANALITH REPOSITIONING PROC: CPT

## 2024-10-03 NOTE — PROGRESS NOTES
[facility]  PHYSICAL THERAPY  [] EVALUATION  [x] DAILY NOTE (LAND) [] DAILY NOTE (AQUATIC ) [] PROGRESS NOTE [] DISCHARGE NOTE    [x] OUTPATIENT REHABILITATION [facility]   [] [place] [facility]    [] [facility]   [] [facility]    Date: 10/3/2024  Patient Name:  Tera Andre  : 1935  MRN: [id number]  CSN: [id number]    Referring Practitioner Libertad Hoang, APRN - CNP [id number]      Diagnosis Other abnormalities of gait and mobility   Treatment Diagnosis H81.11  Benign paroxysmal vertigo, right ear  M54.89  Other Dorsalgia  H81.93 Vestibular dysfunction; bilaterally  R26.89  Abnormalities of gait and mobility  R42   Dizziness and giddiness   Date of Evaluation 24   Additional Pertinent History Tera Andre has a past medical history of BCC (basal cell carcinoma), face, CAD (coronary artery disease), COPD (chronic obstructive pulmonary disease) (HCC), GERD (gastroesophageal reflux disease), Hyperlipidemia, Hypertension, Hypothyroidism, Irregular heart beat, LISA (nonalcoholic steatohepatitis), OA (osteoarthritis), Sleep apnea, and Testosterone deficiency.  he     has a past surgical history that includes pituitary surgery (); Mohs surgery (); Skin graft; shoulder surgery (Right, 14); skin biopsy; eye surgery (); Tonsillectomy and adenoidectomy; Uvulectomy (??); Mohs surgery (2015); chg us guidance needle placement img s&i (2016); Colonoscopy; joint replacement (); joint replacement (); joint replacement (2014 - aug.); Cardiac catheterization (10/04/2010); Cardiac catheterization (); Mohs surgery (N/A, 2018); Skin cancer excision; and Leg biopsy excision (Left, 2021).     Allergies Allergies   Allergen Reactions    Lisinopril Other (See Comments)     Cough      Iv Dye [Iodides] Rash      Medications   Current Outpatient Medications:     Diclofenac Sodium 3 % GEL, 1 Application, Disp: , Rfl:

## 2024-10-10 ENCOUNTER — HOSPITAL ENCOUNTER (OUTPATIENT)
Dept: PHYSICAL THERAPY | Age: 89
Setting detail: THERAPIES SERIES
Discharge: HOME | End: 2024-10-10
Payer: MEDICARE

## 2024-10-10 PROCEDURE — 97112 NEUROMUSCULAR REEDUCATION: CPT

## 2024-10-10 PROCEDURE — 95992 CANALITH REPOSITIONING PROC: CPT

## 2024-10-10 NOTE — PROGRESS NOTES
[facility]  PHYSICAL THERAPY  [] EVALUATION  [x] DAILY NOTE (LAND) [] DAILY NOTE (AQUATIC ) [] PROGRESS NOTE [] DISCHARGE NOTE    [x] OUTPATIENT REHABILITATION [facility]   [] [place] [facility] [place]    [] [facility]   [] [facility]    Date: 10/10/2024  Patient Name:  Tera Andre  : 1935  MRN: [id number]  CSN: [id number]    Referring Practitioner Libertad Hoang, APRN - CNP [phone]      Diagnosis Other abnormalities of gait and mobility   Treatment Diagnosis H81.11  Benign paroxysmal vertigo, right ear  M54.89  Other Dorsalgia  H81.93 Vestibular dysfunction; bilaterally  R26.89  Abnormalities of gait and mobility  R42   Dizziness and giddiness   Date of Evaluation 24   Additional Pertinent History Tera Andre has a past medical history of BCC (basal cell carcinoma), face, CAD (coronary artery disease), COPD (chronic obstructive pulmonary disease) (HCC), GERD (gastroesophageal reflux disease), Hyperlipidemia, Hypertension, Hypothyroidism, Irregular heart beat, ILSA (nonalcoholic steatohepatitis), OA (osteoarthritis), Sleep apnea, and Testosterone deficiency.  he     has a past surgical history that includes pituitary surgery (); Mohs surgery (); Skin graft; shoulder surgery (Right, 14); skin biopsy; eye surgery (); Tonsillectomy and adenoidectomy; Uvulectomy (??); Mohs surgery (2015); chg us guidance needle placement img s&i (2016); Colonoscopy; joint replacement (); joint replacement (); joint replacement (2014 - aug.); Cardiac catheterization (10/04/2010); Cardiac catheterization (); Mohs surgery (N/A, 2018); Skin cancer excision; and Leg biopsy excision (Left, 2021).     Allergies Allergies   Allergen Reactions    Lisinopril Other (See Comments)     Cough      Iv Dye [Iodides] Rash      Medications   Current Outpatient Medications:     Diclofenac Sodium 3 % GEL, 1 Application, Disp: , Rfl:

## 2024-10-17 ENCOUNTER — HOSPITAL ENCOUNTER (OUTPATIENT)
Dept: PHYSICAL THERAPY | Age: 89
Setting detail: THERAPIES SERIES
Discharge: HOME | End: 2024-10-17
Payer: MEDICARE

## 2024-10-17 PROCEDURE — 95992 CANALITH REPOSITIONING PROC: CPT

## 2024-10-17 PROCEDURE — 97112 NEUROMUSCULAR REEDUCATION: CPT

## 2024-10-17 NOTE — PROGRESS NOTES
Re-education, Manual Therapy - Soft Tissue Mobilization, Manual Therapy - Joint Manipulation, Pain Management, Home Exercise Program, Patient Education, Safety Education and Training, Integrative Dry Needling, Vestibular Rehabilitation, Aquatics, Modalities, and Postural Retraining    []  Plan of care initiated.  Plan to see patient 1 times per week for 12 weeks to address the treatment planned outlined above.  [x]  Continue with current plan of care  []  Modify plan of care as follows:    []  Hold pending physician visit  []  Discharge    Time In 1300   Time Out 1345   Timed Code Minutes: 45   Total Treatment Time: 45       Electronically Signed by: Elizabeth Avery, PT

## 2024-10-23 ENCOUNTER — HOSPITAL ENCOUNTER (OUTPATIENT)
Dept: PHYSICAL THERAPY | Age: 89
Setting detail: THERAPIES SERIES
Discharge: HOME | End: 2024-10-23
Payer: MEDICARE

## 2024-10-23 PROCEDURE — 97112 NEUROMUSCULAR REEDUCATION: CPT

## 2024-10-23 NOTE — PROGRESS NOTES
amLODIPine (NORVASC) 2.5 MG tablet, Take 1 tablet by mouth daily, Disp: 90 tablet, Rfl: 1    ciclopirox (PENLAC) 8 % solution, Apply topically nightly Apply topically nightly., Disp: , Rfl:     meclizine (ANTIVERT) 25 MG tablet, Take 1 tablet by mouth 3 times daily as needed, Disp: , Rfl:     calcium carbonate (OSCAL) 500 MG TABS tablet, Take 1 tablet by mouth daily qoday, Disp: , Rfl:     losartan (COZAAR) 100 MG tablet, Take 1 tablet by mouth daily, Disp: 90 tablet, Rfl: 1    benzonatate (TESSALON) 100 MG capsule, Take 1 capsule by mouth 3 times daily as needed for Cough, Disp: 90 capsule, Rfl: 2    naproxen (NAPROSYN) 500 MG tablet, Take 1 tablet by mouth daily, Disp: 90 tablet, Rfl: 3    fluticasone (FLONASE) 50 MCG/ACT nasal spray, 2 sprays by Each Nostril route daily, Disp: 16 g, Rfl: 0    vitamin D 25 MCG (1000 UT) CAPS, Take by mouth, Disp: , Rfl:     albuterol sulfate HFA (PROVENTIL;VENTOLIN;PROAIR) 108 (90 Base) MCG/ACT inhaler, Inhale 2 puffs into the lungs every 6 hours as needed for Wheezing, Disp: 18 g, Rfl: 1    atorvastatin (LIPITOR) 80 MG tablet, Take 1 tablet by mouth daily, Disp: 90 tablet, Rfl: 3    sotalol (BETAPACE) 80 MG tablet, Take 1 tablet by mouth 2 times daily, Disp: , Rfl:     Denosumab (PROLIA SC), Inject into the skin Every 6 months, Disp: , Rfl:     levothyroxine (SYNTHROID) 75 MCG tablet, Take 1 tablet by mouth Daily, Disp: , Rfl:     isosorbide mononitrate (IMDUR) 60 MG CR tablet, Take 1 tablet by mouth daily, Disp: , Rfl:     aspirin 81 MG EC tablet, Take 1 tablet by mouth daily Last dose 5-7-2015 for surgery 5-, Disp: , Rfl:       Functional Outcome Measure Used Pena Balance Scale, Dizziness Handicap Index   Functional Outcome Score 34/56 Pena Balance Scale (Fall risk), 42/100 DHI (9/27/24)       Insurance: Primary: Payor: MEDICARE /  /  / ,   Secondary: [employer]   Authorization Information PRECERTIFICATION REQUIRED:  No  INSURANCE THERAPY BENEFIT:

## 2024-10-24 ENCOUNTER — APPOINTMENT (OUTPATIENT)
Dept: PHYSICAL THERAPY | Age: 89
End: 2024-10-24
Payer: MEDICARE

## 2024-10-31 ENCOUNTER — HOSPITAL ENCOUNTER (OUTPATIENT)
Dept: PHYSICAL THERAPY | Age: 89
Setting detail: THERAPIES SERIES
Discharge: HOME | End: 2024-10-31
Payer: MEDICARE

## 2024-10-31 PROCEDURE — 97110 THERAPEUTIC EXERCISES: CPT

## 2024-10-31 PROCEDURE — 97112 NEUROMUSCULAR REEDUCATION: CPT

## 2024-10-31 NOTE — PROGRESS NOTES
Manual Therapy Time/Technique  Notes                     Exercise/Intervention   Notes   Cannalith repositioning maneuver (CRM) to correct Right posterolateral cannalithiasis    Held maneuvers past 2 visits.    Cannalith repositioning maneuver (CRM) to correct RIght horizontal cannalithiasis       Cannalith repositioning maneuver (CRM) to correct Left posterolateral cannalithiasis               Vestibular rehab:        X1 viewing exercise seated - horizontal and vertical 30 sec each way  x More difficulty with horizontal, cues to increase speed caused him to lose his focus on target. Denies much increase in dizziness during exercise.    X1 viewing exercise standing - horizontal and vertical  30 sec each way x 2 reps  x Noted increased postural sway, needing CGA from therapist. Dizziness increased during seated rest afterward, lasted about 60 seconds          Postural exercises:       Seated with towel roll at lumbar spine - for upright cervical and thoracic posture 10 mins   Patient tolerated well, reports he is performing at home                 REASSESSMENT: see goals for Pena Balance Scale, Dizziness Handicap Index   x                                  Specific Interventions Next Treatment: Recheck for BPPV positional testing and treat with CRM, emphasis on postural exercises for thoracic extension, prone unloading as tolerated for upright posture to promote balance. Standing balance training, vestibular rehab including VOR gain with x1 viewing exercise.     Activity/Treatment Tolerance:  [x]  Patient tolerated treatment well  []  Patient limited by fatigue  []  Patient limited by pain   []  Patient limited by medical complications  []  Other:     Assessment: Patient has been attending PT once per week for one month to address vestibular rehab for BPPV. Patient responded well to cannalith repositioning maneuvers, ultimately needing maneuvers for Right posterolateral, Right horizontal, and Left

## 2024-11-04 ENCOUNTER — HOSPITAL ENCOUNTER (OUTPATIENT)
Dept: PHYSICAL THERAPY | Age: 89
Setting detail: THERAPIES SERIES
Discharge: HOME | End: 2024-11-04
Payer: MEDICARE

## 2024-11-04 PROCEDURE — 97112 NEUROMUSCULAR REEDUCATION: CPT

## 2024-11-04 NOTE — PROGRESS NOTES
[facility]  PHYSICAL THERAPY  [] EVALUATION  [x] DAILY NOTE (LAND) [] DAILY NOTE (AQUATIC ) [] PROGRESS NOTE [] DISCHARGE NOTE    [x] OUTPATIENT REHABILITATION [facility]   [] [place] [facility] [place]    [] [facility]   [] [facility]    Date: 2024  Patient Name:  Tera Andre  : 1935  MRN: [id number]  CSN: [id number]    Referring Practitioner Libertad Hoang, APRN - CNP [phone]      Diagnosis Other abnormalities of gait and mobility   Treatment Diagnosis H81.11  Benign paroxysmal vertigo, right ear  M54.89  Other Dorsalgia  H81.93 Vestibular dysfunction; bilaterally  R26.89  Abnormalities of gait and mobility  R42   Dizziness and giddiness   Date of Evaluation 24   Additional Pertinent History Tera Andre has a past medical history of BCC (basal cell carcinoma), face, CAD (coronary artery disease), COPD (chronic obstructive pulmonary disease) (HCC), GERD (gastroesophageal reflux disease), Hyperlipidemia, Hypertension, Hypothyroidism, Irregular heart beat, LISA (nonalcoholic steatohepatitis), OA (osteoarthritis), Sleep apnea, and Testosterone deficiency.  he     has a past surgical history that includes pituitary surgery (); Mohs surgery (); Skin graft; shoulder surgery (Right, 14); skin biopsy; eye surgery (); Tonsillectomy and adenoidectomy; Uvulectomy (??); Mohs surgery (2015); chg us guidance needle placement img s&i (2016); Colonoscopy; joint replacement (); joint replacement (); joint replacement (2014 - aug.); Cardiac catheterization (10/04/2010); Cardiac catheterization (); Mohs surgery (N/A, 2018); Skin cancer excision; and Leg biopsy excision (Left, 2021).     Allergies Allergies   Allergen Reactions    Lisinopril Other (See Comments)     Cough      Iv Dye [Iodides] Rash      Medications   Current Outpatient Medications:     Diclofenac Sodium 3 % GEL, 1 Application, Disp: , Rfl:

## 2024-11-13 ENCOUNTER — HOSPITAL ENCOUNTER (OUTPATIENT)
Dept: PHYSICAL THERAPY | Age: 89
Setting detail: THERAPIES SERIES
Discharge: HOME | End: 2024-11-13
Payer: MEDICARE

## 2024-11-13 PROCEDURE — 97112 NEUROMUSCULAR REEDUCATION: CPT

## 2024-11-13 NOTE — PROGRESS NOTES
[facility]  PHYSICAL THERAPY  [] EVALUATION  [x] DAILY NOTE (LAND) [] DAILY NOTE (AQUATIC ) [] PROGRESS NOTE [] DISCHARGE NOTE    [x] OUTPATIENT REHABILITATION [facility]   [] [place] [facility] [place]    [] [facility]   [] [facility]    Date: 2024  Patient Name:  Tera Andre  : 1935  MRN: [id number]  CSN: [id number]    Referring Practitioner Libertad Hoang, APRN - CNP [phone]      Diagnosis Other abnormalities of gait and mobility   Treatment Diagnosis H81.11  Benign paroxysmal vertigo, right ear  M54.89  Other Dorsalgia  H81.93 Vestibular dysfunction; bilaterally  R26.89  Abnormalities of gait and mobility  R42   Dizziness and giddiness   Date of Evaluation 24   Additional Pertinent History Tera Andre has a past medical history of BCC (basal cell carcinoma), face, CAD (coronary artery disease), COPD (chronic obstructive pulmonary disease) (HCC), GERD (gastroesophageal reflux disease), Hyperlipidemia, Hypertension, Hypothyroidism, Irregular heart beat, LISA (nonalcoholic steatohepatitis), OA (osteoarthritis), Sleep apnea, and Testosterone deficiency.  he     has a past surgical history that includes pituitary surgery (); Mohs surgery (); Skin graft; shoulder surgery (Right, 14); skin biopsy; eye surgery (); Tonsillectomy and adenoidectomy; Uvulectomy (??); Mohs surgery (2015); chg us guidance needle placement img s&i (2016); Colonoscopy; joint replacement (); joint replacement (); joint replacement (2014 - aug.); Cardiac catheterization (10/04/2010); Cardiac catheterization (); Mohs surgery (N/A, 2018); Skin cancer excision; and Leg biopsy excision (Left, 2021).     Allergies Allergies   Allergen Reactions    Lisinopril Other (See Comments)     Cough      Iv Dye [Iodides] Rash      Medications   Current Outpatient Medications:     Diclofenac Sodium 3 % GEL, 1 Application, Disp: , Rfl:

## 2024-11-20 ENCOUNTER — HOSPITAL ENCOUNTER (OUTPATIENT)
Dept: PHYSICAL THERAPY | Age: 89
Setting detail: THERAPIES SERIES
Discharge: HOME | End: 2024-11-20
Payer: MEDICARE

## 2024-11-20 PROCEDURE — 97112 NEUROMUSCULAR REEDUCATION: CPT

## 2024-11-20 NOTE — PROGRESS NOTES
[facility]  PHYSICAL THERAPY  [] EVALUATION  [x] DAILY NOTE (LAND) [] DAILY NOTE (AQUATIC ) [] PROGRESS NOTE [] DISCHARGE NOTE    [x] OUTPATIENT REHABILITATION [facility]   [] [place] [facility]ARE [place]    [] [facility]   [] [facility]    Date: 2024  Patient Name:  Tera Andre  : 1935  MRN: [id number]  CSN: [id number]    Referring Practitioner Libertad Hoang, APRN - CNP [id number]      Diagnosis Other abnormalities of gait and mobility   Treatment Diagnosis H81.11  Benign paroxysmal vertigo, right ear  M54.89  Other Dorsalgia  H81.93 Vestibular dysfunction; bilaterally  R26.89  Abnormalities of gait and mobility  R42   Dizziness and giddiness   Date of Evaluation 24   Additional Pertinent History Tera Andre has a past medical history of BCC (basal cell carcinoma), face, CAD (coronary artery disease), COPD (chronic obstructive pulmonary disease) (HCC), GERD (gastroesophageal reflux disease), Hyperlipidemia, Hypertension, Hypothyroidism, Irregular heart beat, LISA (nonalcoholic steatohepatitis), OA (osteoarthritis), Sleep apnea, and Testosterone deficiency.  he     has a past surgical history that includes pituitary surgery (); Mohs surgery (); Skin graft; shoulder surgery (Right, 14); skin biopsy; eye surgery (); Tonsillectomy and adenoidectomy; Uvulectomy (??); Mohs surgery (2015); chg us guidance needle placement img s&i (2016); Colonoscopy; joint replacement (); joint replacement (); joint replacement (2014 - aug.); Cardiac catheterization (10/04/2010); Cardiac catheterization (); Mohs surgery (N/A, 2018); Skin cancer excision; and Leg biopsy excision (Left, 2021).     Allergies Allergies   Allergen Reactions    Lisinopril Other (See Comments)     Cough      Iv Dye [Iodides] Rash      Medications   Current Outpatient Medications:     Diclofenac Sodium 3 % GEL, 1 Application, Disp: , Rfl:

## 2024-11-27 ENCOUNTER — APPOINTMENT (OUTPATIENT)
Dept: PHYSICAL THERAPY | Age: 89
End: 2024-11-27
Payer: MEDICARE

## 2024-12-02 SDOH — HEALTH STABILITY: PHYSICAL HEALTH: ON AVERAGE, HOW MANY DAYS PER WEEK DO YOU ENGAGE IN MODERATE TO STRENUOUS EXERCISE (LIKE A BRISK WALK)?: 1 DAY

## 2024-12-02 SDOH — ECONOMIC STABILITY: FOOD INSECURITY: WITHIN THE PAST 12 MONTHS, YOU WORRIED THAT YOUR FOOD WOULD RUN OUT BEFORE YOU GOT MONEY TO BUY MORE.: NEVER TRUE

## 2024-12-02 SDOH — ECONOMIC STABILITY: FOOD INSECURITY: WITHIN THE PAST 12 MONTHS, THE FOOD YOU BOUGHT JUST DIDN'T LAST AND YOU DIDN'T HAVE MONEY TO GET MORE.: NEVER TRUE

## 2024-12-02 SDOH — HEALTH STABILITY: PHYSICAL HEALTH: ON AVERAGE, HOW MANY MINUTES DO YOU ENGAGE IN EXERCISE AT THIS LEVEL?: 0 MIN

## 2024-12-02 SDOH — ECONOMIC STABILITY: INCOME INSECURITY: HOW HARD IS IT FOR YOU TO PAY FOR THE VERY BASICS LIKE FOOD, HOUSING, MEDICAL CARE, AND HEATING?: NOT HARD AT ALL

## 2024-12-02 ASSESSMENT — LIFESTYLE VARIABLES
HOW MANY STANDARD DRINKS CONTAINING ALCOHOL DO YOU HAVE ON A TYPICAL DAY: 1 OR 2
HOW OFTEN DO YOU HAVE SIX OR MORE DRINKS ON ONE OCCASION: 1
HOW OFTEN DO YOU HAVE A DRINK CONTAINING ALCOHOL: 2-4 TIMES A MONTH
HOW OFTEN DO YOU HAVE A DRINK CONTAINING ALCOHOL: 3
HOW MANY STANDARD DRINKS CONTAINING ALCOHOL DO YOU HAVE ON A TYPICAL DAY: 1

## 2024-12-02 ASSESSMENT — PATIENT HEALTH QUESTIONNAIRE - PHQ9
SUM OF ALL RESPONSES TO PHQ QUESTIONS 1-9: 0
SUM OF ALL RESPONSES TO PHQ9 QUESTIONS 1 & 2: 0
1. LITTLE INTEREST OR PLEASURE IN DOING THINGS: NOT AT ALL
SUM OF ALL RESPONSES TO PHQ QUESTIONS 1-9: 0
2. FEELING DOWN, DEPRESSED OR HOPELESS: NOT AT ALL

## 2024-12-03 ENCOUNTER — HOSPITAL ENCOUNTER (OUTPATIENT)
Dept: PHYSICAL THERAPY | Age: 89
Setting detail: THERAPIES SERIES
Discharge: HOME | End: 2024-12-03
Payer: MEDICARE

## 2024-12-03 PROCEDURE — 97112 NEUROMUSCULAR REEDUCATION: CPT

## 2024-12-03 NOTE — THERAPY DISCHARGE
Manual Therapy Time/Technique  Notes                     Exercise/Intervention   Notes   Cannalith repositioning maneuver (CRM) to correct Right posterolateral cannalithiasis       Cannalith repositioning maneuver (CRM) to correct RIght horizontal cannalithiasis       Cannalith repositioning maneuver (CRM) to correct Left posterolateral cannalithiasis               Vestibular rehab:        X1 viewing exercise seated - horizontal and vertical  Using Checkerboard 30 sec each way   More difficulty with horizontal, cues to increase speed caused him to lose his focus on target. Denies much increase in dizziness during exercise.    X1 viewing exercise standing feet hips width apart - horizontal and vertical   Using Checkerboard 1 min ea   Cues for smaller head movements, 30s each direction today          Postural exercises:       Seated with towel roll at lumbar spine - for upright cervical and thoracic posture 10 mins   Patient tolerated well, reports he is performing at home          Balance:       Dynamic gait: side stepping, walking marches, tandem walking  2 laps ea // bars      Rhomberg EO and EC in // bars 1 min ea      Step stance- B lead  1 min ea      Tandem stance- B lead  2*x30s ea   Increased unsteadiness with LLE posterior    Feet together with head turns (vertical, horizontal)  2x10 ea       NBOS on airex  2*x30s      Feet together on airex  1 min*      Low yee sling shot  2x6 R/L   1 UE    Lateral step over yee- unilateral  2x6 R/L   1 UE   Alt step taps  2x10 4 inch  No UE to light UE   Forward step ups onto airex foam pad 6 R/L ea   No UE   Squats  10   Educated on squats with picking up items to decrease dizziness and back pain    Squats and picking up cone from 4 inch step  5x             REASSESSMENT: see goals for Pena Balance Scale, Dizziness Handicap Index 30 mins  x      Specific Interventions Next Treatment: Recheck for BPPV prn, emphasis on postural exercises for thoracic

## 2024-12-05 ENCOUNTER — OFFICE VISIT (OUTPATIENT)
Dept: FAMILY MEDICINE CLINIC | Age: 89
End: 2024-12-05

## 2024-12-05 ENCOUNTER — LAB (OUTPATIENT)
Dept: LAB | Age: 89
End: 2024-12-05

## 2024-12-05 VITALS
WEIGHT: 181.8 LBS | DIASTOLIC BLOOD PRESSURE: 70 MMHG | HEART RATE: 76 BPM | BODY MASS INDEX: 28.53 KG/M2 | RESPIRATION RATE: 16 BRPM | OXYGEN SATURATION: 90 % | TEMPERATURE: 97.7 F | SYSTOLIC BLOOD PRESSURE: 128 MMHG | HEIGHT: 67 IN

## 2024-12-05 DIAGNOSIS — K21.9 GASTROESOPHAGEAL REFLUX DISEASE, UNSPECIFIED WHETHER ESOPHAGITIS PRESENT: ICD-10-CM

## 2024-12-05 DIAGNOSIS — E34.9 TESTOSTERONE DEFICIENCY: ICD-10-CM

## 2024-12-05 DIAGNOSIS — E03.9 HYPOTHYROIDISM, UNSPECIFIED TYPE: ICD-10-CM

## 2024-12-05 DIAGNOSIS — I10 ESSENTIAL HYPERTENSION: ICD-10-CM

## 2024-12-05 DIAGNOSIS — M47.815 OSTEOARTHRITIS OF THORACOLUMBAR SPINE, UNSPECIFIED SPINAL OSTEOARTHRITIS COMPLICATION STATUS: ICD-10-CM

## 2024-12-05 DIAGNOSIS — Z00.00 MEDICARE ANNUAL WELLNESS VISIT, SUBSEQUENT: Primary | ICD-10-CM

## 2024-12-05 DIAGNOSIS — I25.10 CORONARY ARTERY DISEASE INVOLVING NATIVE CORONARY ARTERY OF NATIVE HEART WITHOUT ANGINA PECTORIS: ICD-10-CM

## 2024-12-05 DIAGNOSIS — J30.1 SEASONAL ALLERGIC RHINITIS DUE TO POLLEN: ICD-10-CM

## 2024-12-05 DIAGNOSIS — M85.80 OSTEOPENIA, UNSPECIFIED LOCATION: ICD-10-CM

## 2024-12-05 DIAGNOSIS — E78.5 HYPERLIPIDEMIA, UNSPECIFIED HYPERLIPIDEMIA TYPE: ICD-10-CM

## 2024-12-05 DIAGNOSIS — J44.9 CHRONIC OBSTRUCTIVE PULMONARY DISEASE, UNSPECIFIED COPD TYPE (HCC): ICD-10-CM

## 2024-12-05 DIAGNOSIS — G47.33 OSA ON CPAP: ICD-10-CM

## 2024-12-05 LAB
BASOPHILS ABSOLUTE: 0.1 THOU/MM3 (ref 0–0.1)
BASOPHILS NFR BLD AUTO: 1.7 %
CREAT UR-MCNC: 195.6 MG/DL
DEPRECATED RDW RBC AUTO: 42.2 FL (ref 35–45)
EOSINOPHIL NFR BLD AUTO: 6 %
EOSINOPHILS ABSOLUTE: 0.3 THOU/MM3 (ref 0–0.4)
ERYTHROCYTE [DISTWIDTH] IN BLOOD BY AUTOMATED COUNT: 12.3 % (ref 11.5–14.5)
HCT VFR BLD AUTO: 35.6 % (ref 42–52)
HGB BLD-MCNC: 11.5 GM/DL (ref 14–18)
IMM GRANULOCYTES # BLD AUTO: 0.01 THOU/MM3 (ref 0–0.07)
IMM GRANULOCYTES NFR BLD AUTO: 0.2 %
LYMPHOCYTES ABSOLUTE: 1.5 THOU/MM3 (ref 1–4.8)
LYMPHOCYTES NFR BLD AUTO: 32.8 %
MCH RBC QN AUTO: 30.2 PG (ref 26–33)
MCHC RBC AUTO-ENTMCNC: 32.3 GM/DL (ref 32.2–35.5)
MCV RBC AUTO: 93.4 FL (ref 80–94)
MICROALBUMIN UR-MCNC: 8.64 MG/DL
MICROALBUMIN/CREAT RATIO PNL UR: 44 MG/G (ref 0–30)
MONOCYTES ABSOLUTE: 0.6 THOU/MM3 (ref 0.4–1.3)
MONOCYTES NFR BLD AUTO: 12.8 %
NEUTROPHILS ABSOLUTE: 2.2 THOU/MM3 (ref 1.8–7.7)
NEUTROPHILS NFR BLD AUTO: 46.5 %
NRBC BLD AUTO-RTO: 0 /100 WBC
PLATELET # BLD AUTO: 178 THOU/MM3 (ref 130–400)
PMV BLD AUTO: 10.8 FL (ref 9.4–12.4)
RBC # BLD AUTO: 3.81 MILL/MM3 (ref 4.7–6.1)
WBC # BLD AUTO: 4.7 THOU/MM3 (ref 4.8–10.8)

## 2024-12-05 RX ORDER — MULTIVIT WITH MINERALS/LUTEIN
1000 TABLET ORAL DAILY
COMMUNITY

## 2024-12-05 ASSESSMENT — ENCOUNTER SYMPTOMS
CHEST TIGHTNESS: 0
NAUSEA: 0
CONSTIPATION: 1
SHORTNESS OF BREATH: 1
DIARRHEA: 0
VOMITING: 0
ABDOMINAL PAIN: 0
ANAL BLEEDING: 0
BLOOD IN STOOL: 0

## 2024-12-05 NOTE — PATIENT INSTRUCTIONS
Encouraged Ocean Saline Nasal Spray- 2 puffs each nostril 3x/day to help with occasional blood in nose.   Home BP's- Call if > 140/90 on a regular basis.  Continue to follow-up with Jomar Shah (Dr. Machado-) as planned for thyroid management  Follow up with Dr. Rubio ([facility] pulmonology), Dr. Barahona ([facility] cardiology), Dr. Hinton (GI) PRN, and HUI Shah (Dr. Machado) as planned.   Follow up with [facility] for Pituitary Tumor-Dr. Keyon Ross as needed only at this time.    Check CBC and SPOT MA at this time.   Check FLP, CMP, and CBC in 6 months  Continue current medicines.   No refills needed today  Drop off copy of Living Will   Follow up in 6 months with Dr. Piper on Thursday afternoon  Follow-up in 12 months with me            Preventive Health Topics (updated 12/5/2024):  COLONOSCOPY done 10/17/2014 per Dr. Hinton- no further mandated.   [facility] On Prolia per Dr. Machado this time every 6 months (done last 2 weeks)- please check with Dr. Machado re date of next needed DEXA scan  PSA no longer indicated-always okay in past  [facility]IL[facility] E[facility]-  to follow up every 2 years.  P;lease check on next appt date.        Preventing Falls: Care Instructions  Injuries and health problems such as trouble walking or poor eyesight can increase your risk of falling. So can some medicines. But there are things you can do to help prevent falls. You can exercise to get stronger. You can also arrange your home to make it safer.    Talk to your doctor about the medicines you take. Ask if any of them increase the risk of falls and whether they can be changed or stopped.   Try to exercise regularly. It can help improve your strength and balance. This can help lower your risk of falling.         Practice fall safety and prevention.   Wear low-heeled shoes that fit well and give your feet good support. Talk to your doctor if you have foot problems that make this hard.  Carry a cellphone or wear a

## 2024-12-05 NOTE — PROGRESS NOTES
[facility] MEDICINE PRACT[facility]  [address]  [facility] [address]  Dept: [phone]  Dept Fax: [phone]  SUBJECTIVE     Tera Andre is a 89 y.o.male    Pt presents for Medicare Annual Wellness physical exam.      Patient also presents for follow-up of HTN, Hyperlipidemia, Hypothyroidism, CAD, COPD, GERD, Testosterone Deficiency, OA, Osteopenia, DAMASO on CPAP and abnormal \" extra heart beats\".     Pt still living with wife at [facility] in [facility] since 9/22/2023-     Pt doing ok at this time- pt denies any new issues, except occasional blood tinged mucous when blowing nose.      Glucometer readings at home are not needed.    The home BP readings have been checked occasionally. Numbers not recalled.     Wt Readings from Last 3 Encounters:   12/05/24 82.5 kg (181 lb 12.8 oz)   09/16/24 85.4 kg (188 lb 3.2 oz)   06/20/24 82.6 kg (182 lb)   Weight decreased 1# since last visit 6 months ago.      Pt stable since last visit- no new problems for diagnoses listed below:  Patient Active Problem List   Diagnosis    Essential hypertension    Irregular heart beat    Hypothyroidism (Dr. Machado)    Hyperlipidemia    Testosterone deficiency (Dr. Machado- [facility])     DAMASO on CPAP    Coronary artery disease involving native coronary artery of native heart without angina pectoris- (Dr. Barahona)    Gastroesophageal reflux disease    Osteoarthritis of thoracolumbar spine    COPD (chronic obstructive pulmonary disease) ([facility])    Osteopenia    BPPV (benign paroxysmal positional vertigo), left     Review of Systems   Constitutional:  Negative for chills, diaphoresis, fatigue, fever and unexpected weight change.   Eyes:  Negative for visual disturbance.   Respiratory:  Positive for shortness of breath (with exertion- chronic, stable). Negative for chest tightness.    Cardiovascular:  Negative for chest pain, palpitations and leg swelling.   Gastrointestinal:  Positive for 
thyromegaly or thyroid nodules, no cervical lymphadenopathy  Pulmonary/Chest: clear to auscultation bilaterally- no wheezes, rales or rhonchi, normal air movement, no respiratory distress  Cardiovascular: normal rate, regular rhythm, normal S1 and S2, no murmurs, rubs, clicks, or gallops, distal pulses intact, no carotid bruits  Abdomen: soft, non-tender, non-distended, normal bowel sounds, no masses or organomegaly  Extremities: no cyanosis, clubbing or edema  Musculoskeletal: normal range of motion, no joint swelling, deformity or tenderness  Neurologic: reflexes normal and symmetric, no cranial nerve deficit, gait, coordination and speech normal            Allergies   Allergen Reactions    Lisinopril Other (See Comments)     Cough      Etodolac Rash    Iv Dye [Iodides] Rash     Prior to Visit Medications    Medication Sig Taking? Authorizing Provider   Ascorbic Acid (VITAMIN C) 1000 MG tablet Take 1 tablet by mouth daily Yes Greg Rivera MD   amLODIPine (NORVASC) 2.5 MG tablet Take 1 tablet by mouth daily Yes Papa Parsons DO   ciclopirox (PENLAC) 8 % solution Apply topically nightly Apply topically nightly. Yes Greg Rivera MD   meclizine (ANTIVERT) 25 MG tablet Take 1 tablet by mouth 3 times daily as needed Yes Greg Rivera MD   losartan (COZAAR) 100 MG tablet Take 1 tablet by mouth daily Yes Papa Parsons DO   naproxen (NAPROSYN) 500 MG tablet Take 1 tablet by mouth daily Yes Papa Parsons, DO   fluticasone (FLONASE) 50 MCG/ACT nasal spray 2 sprays by Each Nostril route daily  Patient taking differently: 2 sprays by Each Nostril route daily as needed Yes Yesenia Angel MD   vitamin D 25 MCG (1000 UT) CAPS Take by mouth daily Yes Greg Rivera MD   albuterol sulfate HFA (PROVENTIL;VENTOLIN;PROAIR) 108 (90 Base) MCG/ACT inhaler Inhale 2 puffs into the lungs every 6 hours as needed for Wheezing Yes Felix Vazquez MD   atorvastatin (LIPITOR) 80 MG

## 2024-12-11 ENCOUNTER — TELEPHONE (OUTPATIENT)
Dept: FAMILY MEDICINE CLINIC | Age: 89
End: 2024-12-11

## 2024-12-11 DIAGNOSIS — D64.9 ANEMIA, UNSPECIFIED TYPE: Primary | ICD-10-CM

## 2024-12-11 NOTE — TELEPHONE ENCOUNTER
Discussion noted.  Will check FOBT x 3.  Please let patient know that he should do stool samples daily x 3 to look for blood.  Labs should explain how to collect sample.  Also, check anemia profile.  All orders placed.  Please encourage patient to go to [facility] on [address].  Thanks.  SILKE

## 2024-12-11 NOTE — TELEPHONE ENCOUNTER
Patient notified. He states he has not had any prcedures or occurances that have caused any kind of significant bleeding

## 2024-12-11 NOTE — TELEPHONE ENCOUNTER
----- Message from Dr. Sreedhar Hanna MD sent at 12/5/2024  5:54 PM EST -----  Notify pt-   Results reviewed.   Spot microalbumin mildly elevated at 44-continue losartan at this time-will recheck annually  CBC okay, except WBC mildly decreased at 4.7 (stable overall)  And hemoglobin/hematocrit decreased at 11.5/35.6 (previously 12.8/38.6)-does patient have any active bleeding at this time as he did not mention anything at time of appointment?  Recent procedures or surgeries or other significant blood loss?  Please check with patient and let me know.  Thanks.  ES

## 2024-12-13 ENCOUNTER — PATIENT MESSAGE (OUTPATIENT)
Dept: FAMILY MEDICINE CLINIC | Age: 89
End: 2024-12-13

## 2024-12-13 ENCOUNTER — LAB (OUTPATIENT)
Dept: LAB | Age: 89
End: 2024-12-13

## 2024-12-13 DIAGNOSIS — D64.9 ANEMIA, UNSPECIFIED TYPE: ICD-10-CM

## 2024-12-13 LAB
FERRITIN SERPL IA-MCNC: 87 NG/ML (ref 22–322)
FOLATE SERPL-MCNC: 11 NG/ML (ref 4.8–24.2)
HGB RETIC QN AUTO: 33 PG (ref 28.2–35.7)
IMM RETICS NFR: 13 % (ref 2.3–13.4)
IRON SERPL-MCNC: 80 UG/DL (ref 65–195)
RETICS # AUTO: 64 THOU/MM3 (ref 20–115)
RETICS/RBC NFR AUTO: 1.7 % (ref 0.5–2)
TIBC SERPL-MCNC: 254 UG/DL (ref 171–450)
VIT B12 SERPL-MCNC: 408 PG/ML (ref 211–911)

## 2024-12-16 ENCOUNTER — LAB (OUTPATIENT)
Dept: LAB | Age: 89
End: 2024-12-16

## 2024-12-16 LAB — STFR SERPL-MCNC: 3 MG/L (ref 2.2–5)

## 2024-12-26 ENCOUNTER — LAB (OUTPATIENT)
Dept: LAB | Age: 89
End: 2024-12-26

## 2024-12-26 DIAGNOSIS — D64.9 ANEMIA, UNSPECIFIED TYPE: ICD-10-CM

## 2024-12-26 LAB
HEMOCCULT STL QL: NEGATIVE

## 2024-12-27 DIAGNOSIS — D64.9 ANEMIA, UNSPECIFIED TYPE: Primary | ICD-10-CM

## 2025-01-16 DIAGNOSIS — I10 ESSENTIAL HYPERTENSION: ICD-10-CM

## 2025-01-16 RX ORDER — AMLODIPINE BESYLATE 2.5 MG/1
2.5 TABLET ORAL DAILY
Qty: 90 TABLET | Refills: 3 | Status: SHIPPED | OUTPATIENT
Start: 2025-01-16

## 2025-02-18 DIAGNOSIS — E78.5 HYPERLIPIDEMIA, UNSPECIFIED HYPERLIPIDEMIA TYPE: ICD-10-CM

## 2025-02-18 RX ORDER — ATORVASTATIN CALCIUM 80 MG/1
80 TABLET, FILM COATED ORAL DAILY
Qty: 90 TABLET | Refills: 3 | Status: SHIPPED | OUTPATIENT
Start: 2025-02-18

## 2025-02-18 NOTE — TELEPHONE ENCOUNTER
Patient's last appointment was: 12/5/2024  with our   Patient's next appointment is: 6/12/2025  with our Dr. Piper  Last refilled on: 11/2/2023  Which pharmacy does the script need sent to: [facility] [address]      Lab Results   Component Value Date    LABA1C 5.2 04/12/2022     Lab Results   Component Value Date    CHOL 113 05/13/2024    TRIG 112 05/13/2024    HDL 28 05/13/2024    LDLDIRECT 60 05/29/2020     Lab Results   Component Value Date     05/13/2024    K 4.6 05/13/2024     05/13/2024    CO2 26 05/13/2024    BUN 14 05/13/2024    CREATININE 0.7 05/13/2024    GLUCOSE 86 05/13/2024    CALCIUM 9.7 05/13/2024    BILITOT 0.8 05/13/2024    ALKPHOS 67 05/13/2024    AST 22 05/13/2024    ALT 17 05/13/2024    LABGLOM 89 05/13/2024    AGRATIO 1.7 05/29/2020     Lab Results   Component Value Date    TSH 0.024 (L) 06/07/2022    O3PENEE 142.41 08/06/2014    T4FREE 1.44 06/07/2022     Lab Results   Component Value Date    WBC 4.7 (L) 12/05/2024    HGB 11.5 (L) 12/05/2024    HCT 35.6 (L) 12/05/2024    MCV 93.4 12/05/2024     12/05/2024

## 2025-03-07 DIAGNOSIS — I10 ESSENTIAL HYPERTENSION: ICD-10-CM

## 2025-03-07 DIAGNOSIS — I25.10 CORONARY ARTERY DISEASE INVOLVING NATIVE CORONARY ARTERY OF NATIVE HEART WITHOUT ANGINA PECTORIS: Chronic | ICD-10-CM

## 2025-03-07 RX ORDER — LOSARTAN POTASSIUM 100 MG/1
100 TABLET ORAL DAILY
Qty: 90 TABLET | Refills: 3 | Status: SHIPPED | OUTPATIENT
Start: 2025-03-07

## 2025-03-07 NOTE — TELEPHONE ENCOUNTER
Patient's last appointment was: 12/5/2024  with our   Patient's next appointment is: 6/12/2025  with our Dr. Piper  Last refilled on: 05/30/2024  [facility]      Lab Results   Component Value Date    LABA1C 5.2 04/12/2022     Lab Results   Component Value Date    CHOL 113 05/13/2024    TRIG 112 05/13/2024    HDL 28 05/13/2024    LDLDIRECT 60 05/29/2020     Lab Results   Component Value Date     05/13/2024    K 4.6 05/13/2024     05/13/2024    CO2 26 05/13/2024    BUN 14 05/13/2024    CREATININE 0.7 05/13/2024    GLUCOSE 86 05/13/2024    CALCIUM 9.7 05/13/2024    BILITOT 0.8 05/13/2024    ALKPHOS 67 05/13/2024    AST 22 05/13/2024    ALT 17 05/13/2024    LABGLOM 89 05/13/2024    AGRATIO 1.7 05/29/2020     Lab Results   Component Value Date    TSH 0.024 (L) 06/07/2022    Y4UVNLT 142.41 08/06/2014    T4FREE 1.44 06/07/2022     Lab Results   Component Value Date    WBC 4.7 (L) 12/05/2024    HGB 11.5 (L) 12/05/2024    HCT 35.6 (L) 12/05/2024    MCV 93.4 12/05/2024     12/05/2024

## 2025-03-28 ENCOUNTER — OFFICE VISIT (OUTPATIENT)
Dept: ENT CLINIC | Age: 89
End: 2025-03-28

## 2025-03-28 VITALS
TEMPERATURE: 97.1 F | DIASTOLIC BLOOD PRESSURE: 70 MMHG | RESPIRATION RATE: 18 BRPM | WEIGHT: 181.6 LBS | HEIGHT: 67 IN | HEART RATE: 57 BPM | BODY MASS INDEX: 28.5 KG/M2 | OXYGEN SATURATION: 97 % | SYSTOLIC BLOOD PRESSURE: 138 MMHG

## 2025-03-28 DIAGNOSIS — H61.21 RIGHT EAR IMPACTED CERUMEN: ICD-10-CM

## 2025-03-28 DIAGNOSIS — Z97.4 WEARS HEARING AID IN BOTH EARS: Primary | ICD-10-CM

## 2025-03-28 RX ORDER — AMOXICILLIN 500 MG/1
CAPSULE ORAL
COMMUNITY
Start: 2025-03-13

## 2025-03-28 NOTE — PROGRESS NOTES
Procedure note:  DATE AND TIME OF PROCEDURE: 3/28/2025 11:47 AM  PATIENT NAME: Tera nAdre  MRN [id number]  PROVIDER: IGNACIO Quiroga CNP   PRE-PROCEDURE DIAGNOSIS:  Right cerumen impaction  POST-PROCEDURE DIAGNOSIS:   Same     INDICATION: Cerumenosis causing an inability to visualize the tympanic membrane, middle ear space and/or external auditory canal.     TEACHING: Procedure, benefits, and risks were explained to patient/family. Verbal informed consent was obtained.    TIME OUT: A time out was conducted immediately before starting the procedure that confirmed a final verification of the correct patient, correct procedure, correct patient position, correct site and availability of special equipment.    DESCRIPTION OF PROCEDURE:  PROCEDURE: Cerumenectomy  SITE: Right ear(s)    ANESTHESIA:  NONE  COMPLICATIONS: NONE  EBL: NONE    PROCEDURE: Handheld otoscope used to visualize EAC. Right cerumen in place, obstructing visualization of tympanic membranes.  Cerumen removed with curette and suction until tympanic membranes could be visualized as documented above.  Normal exam. The patient tolerated the procedure well, with no complications.

## 2025-04-14 ENCOUNTER — HOSPITAL ENCOUNTER (OUTPATIENT)
Age: 89
Discharge: HOME | End: 2025-04-14
Payer: MEDICARE

## 2025-04-14 ENCOUNTER — OFFICE VISIT (OUTPATIENT)
Dept: FAMILY MEDICINE CLINIC | Age: 89
End: 2025-04-14

## 2025-04-14 ENCOUNTER — HOSPITAL ENCOUNTER (OUTPATIENT)
Dept: GENERAL RADIOLOGY | Age: 89
Discharge: HOME | End: 2025-04-14
Payer: MEDICARE

## 2025-04-14 VITALS
HEIGHT: 67 IN | SYSTOLIC BLOOD PRESSURE: 138 MMHG | BODY MASS INDEX: 28.38 KG/M2 | WEIGHT: 180.8 LBS | RESPIRATION RATE: 19 BRPM | DIASTOLIC BLOOD PRESSURE: 78 MMHG | TEMPERATURE: 97.8 F | HEART RATE: 62 BPM | OXYGEN SATURATION: 96 %

## 2025-04-14 DIAGNOSIS — I10 ESSENTIAL (PRIMARY) HYPERTENSION: ICD-10-CM

## 2025-04-14 DIAGNOSIS — C44.41 BASAL CELL CARCINOMA OF SKIN OF SCALP AND NECK: ICD-10-CM

## 2025-04-14 DIAGNOSIS — C44.229 SQUAMOUS CELL CARCINOMA OF SKIN OF LEFT EAR AND EXTERNAL AURICULAR CANAL: ICD-10-CM

## 2025-04-14 DIAGNOSIS — Z87.891 PERSONAL HISTORY OF NICOTINE DEPENDENCE: ICD-10-CM

## 2025-04-14 DIAGNOSIS — B37.2 CANDIDAL DERMATITIS: Primary | ICD-10-CM

## 2025-04-14 DIAGNOSIS — Z01.818 PREPROCEDURAL EXAMINATION: ICD-10-CM

## 2025-04-14 LAB
ANION GAP SERPL CALC-SCNC: 8 MEQ/L (ref 8–16)
BUN SERPL-MCNC: 17 MG/DL (ref 8–23)
CALCIUM SERPL-MCNC: 9.9 MG/DL (ref 8.8–10.2)
CHLORIDE SERPL-SCNC: 103 MEQ/L (ref 98–111)
CO2 SERPL-SCNC: 27 MEQ/L (ref 22–29)
CREAT SERPL-MCNC: 0.9 MG/DL (ref 0.7–1.2)
DEPRECATED RDW RBC AUTO: 44.2 FL (ref 35–45)
EKG ATRIAL RATE: 67 BPM
EKG P AXIS: 49 DEGREES
EKG P-R INTERVAL: 188 MS
EKG Q-T INTERVAL: 400 MS
EKG QRS DURATION: 116 MS
EKG QTC CALCULATION (BAZETT): 422 MS
EKG R AXIS: 17 DEGREES
EKG T AXIS: 64 DEGREES
EKG VENTRICULAR RATE: 67 BPM
ERYTHROCYTE [DISTWIDTH] IN BLOOD BY AUTOMATED COUNT: 12.9 % (ref 11.5–14.5)
GFR SERPL CREATININE-BSD FRML MDRD: 82 ML/MIN/1.73M2
GLUCOSE SERPL-MCNC: 122 MG/DL (ref 74–109)
HCT VFR BLD AUTO: 38.6 % (ref 42–52)
HGB BLD-MCNC: 12.5 GM/DL (ref 14–18)
MCH RBC QN AUTO: 30.1 PG (ref 26–33)
MCHC RBC AUTO-ENTMCNC: 32.4 GM/DL (ref 32.2–35.5)
MCV RBC AUTO: 93 FL (ref 80–94)
PLATELET # BLD AUTO: 154 THOU/MM3 (ref 130–400)
PMV BLD AUTO: 10.7 FL (ref 9.4–12.4)
POTASSIUM SERPL-SCNC: 4.2 MEQ/L (ref 3.5–5.2)
RBC # BLD AUTO: 4.15 MILL/MM3 (ref 4.7–6.1)
SODIUM SERPL-SCNC: 138 MEQ/L (ref 135–145)
WBC # BLD AUTO: 5.5 THOU/MM3 (ref 4.8–10.8)

## 2025-04-14 PROCEDURE — 93005 ELECTROCARDIOGRAM TRACING: CPT | Performed by: SPECIALIST

## 2025-04-14 PROCEDURE — 71046 X-RAY EXAM CHEST 2 VIEWS: CPT

## 2025-04-14 PROCEDURE — 85027 COMPLETE CBC AUTOMATED: CPT

## 2025-04-14 PROCEDURE — 80048 BASIC METABOLIC PNL TOTAL CA: CPT

## 2025-04-14 PROCEDURE — 36415 COLL VENOUS BLD VENIPUNCTURE: CPT

## 2025-04-14 RX ORDER — NYSTATIN 100000 [USP'U]/G
POWDER TOPICAL
Qty: 30 G | Refills: 0 | Status: SHIPPED | OUTPATIENT
Start: 2025-04-24

## 2025-04-14 RX ORDER — CLOTRIMAZOLE 1 %
5 CREAM (GRAM) TOPICAL 2 TIMES DAILY
Qty: 42 G | Refills: 1 | Status: CANCELLED | OUTPATIENT
Start: 2025-04-14 | End: 2025-04-24

## 2025-04-14 RX ORDER — CLOTRIMAZOLE AND BETAMETHASONE DIPROPIONATE 10; .64 MG/G; MG/G
CREAM TOPICAL 2 TIMES DAILY
Qty: 45 G | Refills: 0 | Status: SHIPPED | OUTPATIENT
Start: 2025-04-14 | End: 2025-04-24

## 2025-04-14 SDOH — ECONOMIC STABILITY: FOOD INSECURITY: WITHIN THE PAST 12 MONTHS, YOU WORRIED THAT YOUR FOOD WOULD RUN OUT BEFORE YOU GOT MONEY TO BUY MORE.: NEVER TRUE

## 2025-04-14 SDOH — ECONOMIC STABILITY: FOOD INSECURITY: WITHIN THE PAST 12 MONTHS, THE FOOD YOU BOUGHT JUST DIDN'T LAST AND YOU DIDN'T HAVE MONEY TO GET MORE.: NEVER TRUE

## 2025-04-14 ASSESSMENT — PATIENT HEALTH QUESTIONNAIRE - PHQ9
SUM OF ALL RESPONSES TO PHQ QUESTIONS 1-9: 0
1. LITTLE INTEREST OR PLEASURE IN DOING THINGS: NOT AT ALL
2. FEELING DOWN, DEPRESSED OR HOPELESS: NOT AT ALL

## 2025-04-14 ASSESSMENT — ENCOUNTER SYMPTOMS
SHORTNESS OF BREATH: 0
COUGH: 0
ABDOMINAL PAIN: 0

## 2025-04-14 NOTE — PROGRESS NOTES
Patient:Tera Andre  YOB: 1935   MRN:[id number]    Subjective   89 y.o. male who presents for the following: Rash (Rash on lower abdomen - noticed about a month ago. Denies changes to detergent, lotion, body soaps. )    Rash  This is a new problem. The current episode started in the past 7 days. The problem has been gradually worsening since onset. The affected locations include the abdomen. The rash is characterized by redness and itchiness. He was exposed to nothing. Pertinent negatives include no congestion, cough, fatigue or shortness of breath. Past treatments include moisturizer and anti-itch cream. The treatment provided no relief. There is no history of allergies (IV contrast and meds only).     Review of Systems   Constitutional:  Negative for chills and fatigue.   HENT:  Negative for congestion.    Eyes:  Negative for visual disturbance.   Respiratory:  Negative for cough and shortness of breath.    Cardiovascular:  Negative for chest pain.   Gastrointestinal:  Negative for abdominal pain.   Genitourinary:  Negative for difficulty urinating.   Musculoskeletal:  Negative for gait problem.   Skin:  Positive for rash.   Neurological:  Negative for light-headedness.   Psychiatric/Behavioral:  The patient is not nervous/anxious.      PMHx: He has a past medical history of BCC (basal cell carcinoma), face, CAD (coronary artery disease), COPD (chronic obstructive pulmonary disease) (HCC), GERD (gastroesophageal reflux disease), Hyperlipidemia, Hypertension, Hypothyroidism, Irregular heart beat, LISA (nonalcoholic steatohepatitis), OA (osteoarthritis), Sleep apnea, and Testosterone deficiency.    Objective     Vitals:    04/14/25 1631   BP: 138/78   BP Site: Right Upper Arm   Patient Position: Sitting   BP Cuff Size: Medium Adult   Pulse: 62   Resp: 19   Temp: 97.8 °F (36.6 °C)   TempSrc: Oral   SpO2: 96%   Weight: 82 kg (180 lb 12.8 oz)   Height: 1.702 m (5' 7.01\")   Body mass index is 28.31

## 2025-04-14 NOTE — PROGRESS NOTES
S: 89 y.o. male with   Chief Complaint   Patient presents with    Rash     Rash on lower abdomen - noticed about a month ago. Denies changes to detergent, lotion, body soaps.        HPI: please see resident note for HPI and ROS.    BP Readings from Last 3 Encounters:   04/14/25 138/78   03/28/25 138/70   12/05/24 128/70     Wt Readings from Last 3 Encounters:   04/14/25 82 kg (180 lb 12.8 oz)   03/28/25 82.4 kg (181 lb 9.6 oz)   12/05/24 82.5 kg (181 lb 12.8 oz)       O: VS:  height is 1.702 m (5' 7.01\") and weight is 82 kg (180 lb 12.8 oz). His oral temperature is 97.8 °F (36.6 °C). His blood pressure is 138/78 and his pulse is 62. His respiration is 19 and oxygen saturation is 96%.   Physical exam performed by resident physician     Diagnosis Orders   1. Candidal dermatitis  clotrimazole-betamethasone (LOTRISONE) 1-0.05 % cream    nystatin (MYCOSTATIN) 062390 UNIT/GM powder          Plan:  Please refer to resident note for full plan.    89 year old male presents to the office for rash.  Etiology patient symptoms consistent with candidal dermatitis.  Will plan treat with Lotrisone for acute flare, nystatin powder for suppression therapy secondary to dependent area with increased moisture.  Follow-up if symptoms worsen, do not prove.      Health Maintenance Due   Topic Date Due    Lipids  05/13/2025       Attending Physician Statement  I have discussed the case, including pertinent history and exam findings with the resident.  I agree with the documented assessment and plan as documented by the resident.  RICH Tristan Jr., DO 4/17/2025 7:13 AM

## 2025-04-24 NOTE — PROGRESS NOTES
PAT call attempted, patient unavailable, left message to please call us back at your earliest convenience; [phone]

## 2025-04-28 NOTE — PROGRESS NOTES
NPO after midnight (may have 8 oz of water up to 2 hours before surgery)  Bring insurance info and drivers license  Wear comfortable clean clothing  Do not bring jewelry  Shower night before and morning of surgery with a liquid antibacterial soap  Bring list of medications with dosage and how often taken  Follow all instructions given by your physician   needed at discharge  You must have a responsible adult with you day of surgery and for 24 hours after surgery  Call [phone] for any questions

## 2025-05-02 ENCOUNTER — ANESTHESIA (OUTPATIENT)
Dept: OPERATING ROOM | Age: 89
End: 2025-05-02
Payer: MEDICARE

## 2025-05-02 ENCOUNTER — ANESTHESIA EVENT (OUTPATIENT)
Dept: OPERATING ROOM | Age: 89
End: 2025-05-02
Payer: MEDICARE

## 2025-05-02 ENCOUNTER — HOSPITAL ENCOUNTER (OUTPATIENT)
Age: 89
Setting detail: OUTPATIENT SURGERY
Discharge: HOME | End: 2025-05-02
Attending: SPECIALIST | Admitting: SPECIALIST
Payer: MEDICARE

## 2025-05-02 VITALS
RESPIRATION RATE: 16 BRPM | WEIGHT: 179 LBS | TEMPERATURE: 97.5 F | OXYGEN SATURATION: 94 % | BODY MASS INDEX: 28.09 KG/M2 | HEIGHT: 67 IN | DIASTOLIC BLOOD PRESSURE: 68 MMHG | SYSTOLIC BLOOD PRESSURE: 147 MMHG | HEART RATE: 51 BPM

## 2025-05-02 PROCEDURE — 3700000001 HC ADD 15 MINUTES (ANESTHESIA): Performed by: SPECIALIST

## 2025-05-02 PROCEDURE — 6360000002 HC RX W HCPCS: Performed by: SPECIALIST

## 2025-05-02 PROCEDURE — 3600000012 HC SURGERY LEVEL 2 ADDTL 15MIN: Performed by: SPECIALIST

## 2025-05-02 PROCEDURE — 2580000003 HC RX 258: Performed by: NURSE ANESTHETIST, CERTIFIED REGISTERED

## 2025-05-02 PROCEDURE — 3700000000 HC ANESTHESIA ATTENDED CARE: Performed by: SPECIALIST

## 2025-05-02 PROCEDURE — 7100000010 HC PHASE II RECOVERY - FIRST 15 MIN: Performed by: SPECIALIST

## 2025-05-02 PROCEDURE — 7100000011 HC PHASE II RECOVERY - ADDTL 15 MIN: Performed by: SPECIALIST

## 2025-05-02 PROCEDURE — 2709999900 HC NON-CHARGEABLE SUPPLY: Performed by: SPECIALIST

## 2025-05-02 PROCEDURE — 6370000000 HC RX 637 (ALT 250 FOR IP): Performed by: SPECIALIST

## 2025-05-02 PROCEDURE — 6360000002 HC RX W HCPCS: Performed by: NURSE ANESTHETIST, CERTIFIED REGISTERED

## 2025-05-02 PROCEDURE — 3600000002 HC SURGERY LEVEL 2 BASE: Performed by: SPECIALIST

## 2025-05-02 PROCEDURE — 2500000003 HC RX 250 WO HCPCS: Performed by: SPECIALIST

## 2025-05-02 RX ORDER — LIDOCAINE HYDROCHLORIDE 20 MG/ML
INJECTION, SOLUTION INTRAVENOUS
Status: DISCONTINUED | OUTPATIENT
Start: 2025-05-02 | End: 2025-05-02 | Stop reason: SDUPTHER

## 2025-05-02 RX ORDER — GINSENG 100 MG
CAPSULE ORAL PRN
Status: DISCONTINUED | OUTPATIENT
Start: 2025-05-02 | End: 2025-05-02 | Stop reason: ALTCHOICE

## 2025-05-02 RX ORDER — LIDOCAINE HYDROCHLORIDE AND EPINEPHRINE 10; 10 MG/ML; UG/ML
INJECTION, SOLUTION INFILTRATION; PERINEURAL PRN
Status: DISCONTINUED | OUTPATIENT
Start: 2025-05-02 | End: 2025-05-02 | Stop reason: ALTCHOICE

## 2025-05-02 RX ORDER — PROPOFOL 10 MG/ML
INJECTION, EMULSION INTRAVENOUS
Status: DISCONTINUED | OUTPATIENT
Start: 2025-05-02 | End: 2025-05-02 | Stop reason: SDUPTHER

## 2025-05-02 RX ORDER — SODIUM CHLORIDE 9 MG/ML
INJECTION, SOLUTION INTRAVENOUS
Status: DISCONTINUED | OUTPATIENT
Start: 2025-05-02 | End: 2025-05-02 | Stop reason: SDUPTHER

## 2025-05-02 RX ADMIN — SODIUM CHLORIDE: 9 INJECTION, SOLUTION INTRAVENOUS at 09:48

## 2025-05-02 RX ADMIN — WATER 2000 MG: 1 INJECTION INTRAMUSCULAR; INTRAVENOUS; SUBCUTANEOUS at 09:49

## 2025-05-02 RX ADMIN — PROPOFOL 100 MCG/KG/MIN: 10 INJECTION, EMULSION INTRAVENOUS at 09:50

## 2025-05-02 RX ADMIN — LIDOCAINE HYDROCHLORIDE 40 MG: 20 INJECTION, SOLUTION INTRAVENOUS at 09:50

## 2025-05-02 ASSESSMENT — PAIN - FUNCTIONAL ASSESSMENT
PAIN_FUNCTIONAL_ASSESSMENT: 0-10
PAIN_FUNCTIONAL_ASSESSMENT: NONE - DENIES PAIN

## 2025-05-02 NOTE — PROGRESS NOTES
1046-Patient to Phase II in chair. Report received from Craig Rn. Patient awake and alert. Vitals obtained and stable. Respirations even and unlabored on room air. Patient denies pain and nausea. Head wrap in place. Unable to assess incisions. No drainage noted. Call light in reach. Family in room.  1050-Patient provided with snack and drink. Denies needs. Call light remains in reach.  1100-VSS. Denies pain and nausea. Discharge instructions reviewed. Verbalized understanding. Denies needs. Waiting for Dr. Bell to come to room.  1120-IV removed with no complications. Patient getting dressed at bedside. Ride notified of pickup.  1130-Dr. Bell to room.   1140-Patient meets discharge criteria. Discharged in stable condition. Patient brought to car via wheelchair with assistance from RN. Patient tolerated well. All belongings sent with patient.

## 2025-05-02 NOTE — DISCHARGE INSTRUCTIONS
to your doctor about how you can quit before your surgery.  Do not shave near where you will have surgery. Shaving with a razor can irritate your skin and make it easier to develop an infection.  At the time of your surgery:  Speak up if someone tries to shave you with a razor before surgery. Ask why you need to be shaved and talk with your surgeon if you have any concerns.  Ask if you will get antibiotics before surgery.  After your surgery:  Make sure that your healthcare providers clean their hands before examining you, either with soap and water or an alcohol-based hand rub.  Family and friends who visit you should not touch the surgical wound or dressings.  Family and friends should clean their hands with soap and water or an alcohol-based hand rub before and after visiting you. If you do not see them clean their hands, ask them to clean their hands.    What do I need to do when I go home from the hospital?  Before you go home, your doctor or nurse should explain everything you need to know about taking care of your wound. Make sure you understand how to care for your wound before you leave the hospital.  Always clean your hands before and after caring for your wound.  Before you go home, make sure you know who to contact if you have questions or problems after you get home.  If you have any symptoms of an infection, such as redness and pain at the surgery site, drainage, or fever, call your doctor immediately.    If you have additional questions, please ask your doctor or nurse.

## 2025-05-02 NOTE — OP NOTE
Operative Note    Patient name: Tera Andre             Medical Record Number: [id number]    Primary Care Physician: Sreedhar Hanna MD     1935    Date of Procedure: 2025    Pre-operative Diagnosis:  1.  4cm2 defect of left posterior ear s/p MOHS for squamous cell carcinoma       2.  7cm2 defect of left upper scalp s/p MOHS for basal cell carcinoma    Post-operative Diagnosis: Same    Procedure Performed:  1.  Full thickness skin graft (4 cm2) repair left posterior ear defect      2.  Repair of left upper scalp defect with an adjacent tissue transfer (25cm2)     Surgeons/Assistants: Dr. Radhames Bell MD     Estimated Blood Loss: 5ml     Complications: none immediately appreciated    Procedure:    With the patient lying in the supine position and under adequate anesthesia per the anesthesia team.   Local anesthesia consisting of 19 ml of 1% Lidocaine 1:100,000 with epinephrine solution of the donor site of the left preauricular sulcus as well as the both left upper scalp and left postauricular defect.   The area was prepped and draped in the standard surgical fashion.  The patient has very thin skin and a 4cm2 defect of posterior ear which could not be closed primarily, therefore a full thickness skin graft was taken.  It was defatted and secured in position with a 3-0 silk suture tie and then a 5-0 fast absorbable suture was placed in a simple running fashion.  A Bacitracin Xeroform and moist cotton ball tie over bolster was secured using the tails of the silk sutures.  The donor site was closed with a 4-0 Monocryl suture placed in interrupted buried fashion and then Histoacryl respectively.  Regarding the left upper scalp 7cm2 defect, this was too large to close primarily due to tension.  Therefore a 25cm2 (6cm x 3cm + 7cm2) sum of defect/adjacent tissue transfer inferior/laterally oriented rotation flap was designed, back cut 6cm, the flap was elevated at least 3cm, transposed into the

## 2025-05-02 NOTE — H&P
[facility]  History and Physical Update    Pt Name: Tera Andre  MRN: [id number]  YOB: 1935  Date of evaluation: 5/2/2025    I have examined the patient and reviewed the H&P/Consult and there are no changes to the patient or plans.      Radhames Bell MD  Electronically signed 5/2/2025 at 9:55 AM

## 2025-05-02 NOTE — ANESTHESIA POSTPROCEDURE EVALUATION
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 [C44.229])    Surgeons: Radhames Bell MD Responsible Provider: Jeff Hernandez DO    Anesthesia Type: MAC ASA Status: 3            Anesthesia Type: MAC    Noe Phase I: Noe Score: 10    Noe Phase II:      Anesthesia Post Evaluation    Patient location during evaluation: bedside  Patient participation: complete - patient participated  Level of consciousness: awake and alert  Pain score: 0  Airway patency: patent  Nausea & Vomiting: no nausea and no vomiting  Cardiovascular status: hemodynamically stable  Respiratory status: acceptable  Hydration status: stable  Pain management: adequate        No notable events documented.

## 2025-05-02 NOTE — ANESTHESIA PRE PROCEDURE
Department of Anesthesiology  Preprocedure Note       Name:  Tera Andre   Age:  89 y.o.  :  1935                                          MRN:  [id number]         Date:  2025      Surgeon: Surgeon(s):  Radhames Bell MD    Procedure: Procedure(s):  MOHS DEFECT REPAIR BCC LEFT UPPER SCALP AND MOHS DEFECT REPAIR SCC LEFT POSTERIOR FACE OF EAR    Medications prior to admission:   Prior to Admission medications    Medication Sig Start Date End Date Taking? Authorizing Provider   losartan (COZAAR) 100 MG tablet Take 1 tablet by mouth daily 3/7/25  Yes Sreedhar Hanna MD   atorvastatin (LIPITOR) 80 MG tablet Take 1 tablet by mouth daily 25  Yes Anh Coy MD   amLODIPine (NORVASC) 2.5 MG tablet Take 1 tablet by mouth daily 25  Yes Sreedhar Hanna MD   meclizine (ANTIVERT) 25 MG tablet Take 1 tablet by mouth Daily with lunch   Yes ProviderGreg MD   albuterol sulfate HFA (PROVENTIL;VENTOLIN;PROAIR) 108 (90 Base) MCG/ACT inhaler Inhale 2 puffs into the lungs every 6 hours as needed for Wheezing 3/4/24  Yes Felix Vazquez MD   sotalol (BETAPACE) 80 MG tablet Take 0.5 tablets by mouth 2 times daily 22  Yes Greg Rivera MD   levothyroxine (SYNTHROID) 75 MCG tablet Take 1 tablet by mouth Daily   Yes Greg Rivera MD   isosorbide mononitrate (IMDUR) 60 MG CR tablet Take 1 tablet by mouth daily   Yes Greg Rivera MD   Ascorbic Acid (VITAMIN C) 1000 MG tablet Take 1 tablet by mouth daily    Greg Rivera MD   naproxen (NAPROSYN) 500 MG tablet Take 1 tablet by mouth daily 24   Papa Parsons DO   vitamin D 25 MCG (1000 UT) CAPS Take by mouth daily    Greg Rivera MD   aspirin 81 MG EC tablet Take 1 tablet by mouth daily    Greg Rivera MD       Current medications:    Current Facility-Administered Medications   Medication Dose Route Frequency Provider Last Rate Last Admin   • ceFAZolin (ANCEF) 2,000 mg in

## 2025-05-27 ENCOUNTER — LAB (OUTPATIENT)
Dept: LAB | Age: 89
End: 2025-05-27

## 2025-05-27 LAB
ALBUMIN SERPL BCG-MCNC: 3.5 G/DL (ref 3.4–4.9)
ALP SERPL-CCNC: 74 U/L (ref 40–129)
ALT SERPL W/O P-5'-P-CCNC: 18 U/L (ref 10–50)
ANION GAP SERPL CALC-SCNC: 8 MEQ/L (ref 8–16)
AST SERPL-CCNC: 26 U/L (ref 10–50)
BILIRUB SERPL-MCNC: 0.5 MG/DL (ref 0.3–1.2)
BUN SERPL-MCNC: 12 MG/DL (ref 8–23)
CALCIUM SERPL-MCNC: 9 MG/DL (ref 8.8–10.2)
CHLORIDE SERPL-SCNC: 102 MEQ/L (ref 98–111)
CHOLEST SERPL-MCNC: 106 MG/DL (ref 100–199)
CO2 SERPL-SCNC: 28 MEQ/L (ref 22–29)
CREAT SERPL-MCNC: 0.8 MG/DL (ref 0.7–1.2)
GFR SERPL CREATININE-BSD FRML MDRD: 85 ML/MIN/1.73M2
GLUCOSE SERPL-MCNC: 86 MG/DL (ref 74–109)
HDLC SERPL-MCNC: 29 MG/DL
LDLC SERPL CALC-MCNC: 59 MG/DL
MAGNESIUM SERPL-MCNC: 2 MG/DL (ref 1.6–2.6)
POTASSIUM SERPL-SCNC: 4.3 MEQ/L (ref 3.5–5.2)
PROT SERPL-MCNC: 6.2 G/DL (ref 6.4–8.3)
SODIUM SERPL-SCNC: 138 MEQ/L (ref 135–145)
TRIGL SERPL-MCNC: 92 MG/DL (ref 0–199)

## 2025-06-04 DIAGNOSIS — M47.815 OSTEOARTHRITIS OF THORACOLUMBAR SPINE, UNSPECIFIED SPINAL OSTEOARTHRITIS COMPLICATION STATUS: ICD-10-CM

## 2025-06-04 NOTE — TELEPHONE ENCOUNTER
Patient's last appointment was: 4/14/2025  with our   Patient's next appointment is: 6/17/2025  with our Dr. Piper  Last refilled on: 05/30/2024  Which [facility] the script [facility]      Lab Results   Component Value Date    LABA1C 5.2 04/12/2022     Lab Results   Component Value Date    CHOL 106 05/27/2025    TRIG 92 05/27/2025    HDL 29 05/27/2025    LDLDIRECT 60 05/29/2020     Lab Results   Component Value Date     05/27/2025    K 4.3 05/27/2025     05/27/2025    CO2 28 05/27/2025    BUN 12 05/27/2025    CREATININE 0.8 05/27/2025    GLUCOSE 86 05/27/2025    CALCIUM 9.0 05/27/2025    BILITOT 0.5 05/27/2025    ALKPHOS 74 05/27/2025    AST 26 05/27/2025    ALT 18 05/27/2025    LABGLOM 85 05/27/2025    AGRATIO 1.7 05/29/2020     Lab Results   Component Value Date    TSH 0.024 (L) 06/07/2022    F3LPJTZ 142.41 08/06/2014    T4FREE 1.44 06/07/2022     Lab Results   Component Value Date    WBC 5.5 04/14/2025    HGB 12.5 (L) 04/14/2025    HCT 38.6 (L) 04/14/2025    MCV 93.0 04/14/2025     04/14/2025

## 2025-06-05 RX ORDER — NAPROXEN 500 MG/1
500 TABLET ORAL DAILY
Qty: 90 TABLET | Refills: 0 | Status: SHIPPED | OUTPATIENT
Start: 2025-06-05

## 2025-06-17 ENCOUNTER — OFFICE VISIT (OUTPATIENT)
Dept: FAMILY MEDICINE CLINIC | Age: 89
End: 2025-06-17

## 2025-06-17 VITALS
OXYGEN SATURATION: 97 % | BODY MASS INDEX: 29.47 KG/M2 | SYSTOLIC BLOOD PRESSURE: 132 MMHG | RESPIRATION RATE: 14 BRPM | WEIGHT: 187.8 LBS | HEIGHT: 67 IN | DIASTOLIC BLOOD PRESSURE: 71 MMHG | TEMPERATURE: 98.2 F | HEART RATE: 62 BPM

## 2025-06-17 DIAGNOSIS — R09.89 RUNNY NOSE: ICD-10-CM

## 2025-06-17 DIAGNOSIS — G47.33 OSA ON CPAP: ICD-10-CM

## 2025-06-17 DIAGNOSIS — E78.5 HYPERLIPIDEMIA, UNSPECIFIED HYPERLIPIDEMIA TYPE: ICD-10-CM

## 2025-06-17 DIAGNOSIS — R05.2 SUBACUTE COUGH: ICD-10-CM

## 2025-06-17 DIAGNOSIS — I10 ESSENTIAL HYPERTENSION: Primary | ICD-10-CM

## 2025-06-17 DIAGNOSIS — J44.9 CHRONIC OBSTRUCTIVE PULMONARY DISEASE, UNSPECIFIED COPD TYPE (HCC): ICD-10-CM

## 2025-06-17 LAB
EXP DATE SOLUTION: NORMAL
EXP DATE SWAB: NORMAL
EXPIRATION DATE: NORMAL
INFLUENZA A RNA, POC: NORMAL
INFLUENZA B RNA, POC: NORMAL
LOT NUMBER POC: NORMAL
LOT NUMBER SOLUTION: NORMAL
LOT NUMBER SWAB: NORMAL
SARS-COV-2 RNA, POC: NEGATIVE
VALID INTERNAL CONTROL: NORMAL

## 2025-06-17 RX ORDER — CETIRIZINE HYDROCHLORIDE 10 MG/1
10 TABLET ORAL DAILY
Qty: 30 TABLET | Refills: 1 | Status: SHIPPED | OUTPATIENT
Start: 2025-06-17

## 2025-06-17 RX ORDER — FLUTICASONE PROPIONATE 50 MCG
SPRAY, SUSPENSION (ML) NASAL
Qty: 16 G | Refills: 3 | Status: SHIPPED | OUTPATIENT
Start: 2025-06-17 | End: 2025-06-18

## 2025-06-17 ASSESSMENT — ENCOUNTER SYMPTOMS
RHINORRHEA: 1
CONSTIPATION: 0
SINUS PAIN: 0
SHORTNESS OF BREATH: 0
SORE THROAT: 0
TROUBLE SWALLOWING: 0
VOMITING: 0
DIARRHEA: 0
NAUSEA: 0
SINUS PRESSURE: 0

## 2025-06-17 NOTE — PROGRESS NOTES
Patient:Tera Andre  YOB: 1935   MRN:[id number]    Subjective   89 y.o. male who presents for the followin Month Follow-Up (6 month follow up . Patient states he has a lot of sinus drainage, wondering about medication to help with that . Patient had skin cancer testing done behind left ear . )    Patient presents for follow-up regarding hypertension and DAMASO. His blood pressure is well controlled and he is using his CPAP nightly.    He does have an acute concern about runny nose, cough, and sneezing that has been going on for the past 2-3 months. It has slowly been getting worse. He notes that his symptoms are worse upon awakening but persist throughout the day. It is not associated with food or drink. He has no difficulties swallowing. He tried over the counter allergy medication which helped a little. He is no longer taking it. He does have allergies to pollen. He denies fever, shortness of breath, chest pain, congestion, sore throat, sinus pain/pressure, and post-nasal drip. He has no known sick contacts.       Review of Systems   Constitutional:  Negative for chills and fever.   HENT:  Positive for rhinorrhea and sneezing. Negative for congestion, postnasal drip, sinus pressure, sinus pain, sore throat and trouble swallowing.    Respiratory:  Negative for shortness of breath.    Cardiovascular:  Negative for chest pain.   Gastrointestinal:  Negative for constipation, diarrhea, nausea and vomiting.   All other systems reviewed and are negative.    PMHx: He has a past medical history of BCC (basal cell carcinoma), face, CAD (coronary artery disease), COPD (chronic obstructive pulmonary disease) (HCC), Hyperlipidemia, Hypertension, Hypothyroidism, Irregular heart beat, OA (osteoarthritis), Sleep apnea, Testosterone deficiency, and Vertigo.    Objective     Vitals:    25 1124   BP: 132/71   BP Site: Right Upper Arm   Patient Position: Sitting   BP Cuff Size: Medium Adult   Pulse: 62

## 2025-06-17 NOTE — PROGRESS NOTES
[facility]  [address]  [place] [id number]  Dept: [phone]  Loc: [phone]        Tera Andre is a 89 y.o. male who presents today for:  Chief Complaint   Patient presents with    6 Month Follow-Up     6 month follow up . Patient states he has a lot of sinus drainage, wondering about medication to help with that . Patient had skin cancer testing done behind left ear .        HPI:   Tera Andre is 89 y.o. presents today for 6 month follow up for HTN , COPD, DAMASO.   Pt states that he is doing well with his chronic conditions but does report that for the past couple couple of months he has been having some runny nose, sneezing and cough that he thinks has been worsening in the last couple of weeks. He states that in the morning it is worse but it is present throughout the day. Denies it being associated with food, drink or positions. States that he is tolerating a nml diet and has no difficulty/pain with swallowing.    Denies f/c, cp, sob, n/v, diarrhea, denies sick contacts.       Objective:     Vitals:    06/17/25 1124   BP: 132/71   Pulse: 62   Resp: 14   Temp: 98.2 °F (36.8 °C)   SpO2: 97%         Wt Readings from Last 3 Encounters:   06/17/25 85.2 kg (187 lb 12.8 oz)   05/02/25 81.2 kg (179 lb)   04/14/25 82 kg (180 lb 12.8 oz)       BP Readings from Last 3 Encounters:   06/17/25 132/71   05/02/25 (!) 147/68   04/14/25 138/78       Lab Results   Component Value Date    WBC 5.5 04/14/2025    HGB 12.5 (L) 04/14/2025    HCT 38.6 (L) 04/14/2025    MCV 93.0 04/14/2025     04/14/2025     Lab Results   Component Value Date     05/27/2025    K 4.3 05/27/2025     05/27/2025    CO2 28 05/27/2025    BUN 12 05/27/2025    CREATININE 0.8 05/27/2025    GLUCOSE 86 05/27/2025    CALCIUM 9.0 05/27/2025    BILITOT 0.5 05/27/2025    ALKPHOS 74 05/27/2025    AST 26 05/27/2025    ALT 18 05/27/2025    LABGLOM 85

## 2025-06-17 NOTE — PROGRESS NOTES
S: 89 y.o. male with   Chief Complaint   Patient presents with    6 Month Follow-Up     6 month follow up . Patient states he has a lot of sinus drainage, wondering about medication to help with that . Patient had skin cancer testing done behind left ear .        Chief complaint, [place], and all pertinent details of the case reviewed with the resident.    Please see resident's note for specific details discussed at today's visit.    BP Readings from Last 3 Encounters:   06/17/25 132/71   05/02/25 (!) 147/68   04/14/25 138/78     Wt Readings from Last 3 Encounters:   06/17/25 85.2 kg (187 lb 12.8 oz)   05/02/25 81.2 kg (179 lb)   04/14/25 82 kg (180 lb 12.8 oz)       O: VS:  height is 1.702 m (5' 7.01\") and weight is 85.2 kg (187 lb 12.8 oz). His oral temperature is 98.2 °F (36.8 °C). His blood pressure is 132/71 and his pulse is 62. His respiration is 14 and oxygen saturation is 97%.   AAO/NAD, appropriate affect for mood  A:Pollen allergy  contributing to rhinorrhea  Otherwise stable   Diagnosis Orders   1. Essential hypertension        2. Chronic obstructive pulmonary disease, unspecified COPD type (HCC)        3. Hyperlipidemia, unspecified hyperlipidemia type        4. DAMASO on CPAP        5. Runny nose  fluticasone (FLONASE) 50 MCG/ACT nasal spray    cetirizine (ZYRTEC) 10 MG tablet    AMB POC COVID-19 & INFLUENZA A/B      6. Subacute cough  fluticasone (FLONASE) 50 MCG/ACT nasal spray    cetirizine (ZYRTEC) 10 MG tablet    AMB POC COVID-19 & INFLUENZA A/B          Plan:  Will add zyrtec and flonase  F/u with  for AWV next January, but sooner prn    Health Maintenance Due   Topic Date Due    COVID-19 Vaccine (12 - 2024-25 season) 05/06/2025       Attending Physician Statement  I have discussed the case, including pertinent history and exam findings with the resident. I also have seen the patient and performed key portions of the examination.  I agree with the documented assessment and plan as documented

## 2025-06-17 NOTE — TELEPHONE ENCOUNTER
Patient's last appointment was: 6/17/2025  with our   Patient's next appointment is: 1/8/2026 with our Dr. Hanna  Last refilled on: 6/17/2025  [facility]      Lab Results   Component Value Date    LABA1C 5.2 04/12/2022     Lab Results   Component Value Date    CHOL 106 05/27/2025    TRIG 92 05/27/2025    HDL 29 05/27/2025    LDLDIRECT 60 05/29/2020     Lab Results   Component Value Date     05/27/2025    K 4.3 05/27/2025     05/27/2025    CO2 28 05/27/2025    BUN 12 05/27/2025    CREATININE 0.8 05/27/2025    GLUCOSE 86 05/27/2025    CALCIUM 9.0 05/27/2025    BILITOT 0.5 05/27/2025    ALKPHOS 74 05/27/2025    AST 26 05/27/2025    ALT 18 05/27/2025    LABGLOM 85 05/27/2025    AGRATIO 1.7 05/29/2020     Lab Results   Component Value Date    TSH 0.024 (L) 06/07/2022    N2QNITA 142.41 08/06/2014    T4FREE 1.44 06/07/2022     Lab Results   Component Value Date    WBC 5.5 04/14/2025    HGB 12.5 (L) 04/14/2025    HCT 38.6 (L) 04/14/2025    MCV 93.0 04/14/2025     04/14/2025

## 2025-06-18 RX ORDER — FLUTICASONE PROPIONATE 50 MCG
SPRAY, SUSPENSION (ML) NASAL
Qty: 48 G | Refills: 0 | Status: SHIPPED | OUTPATIENT
Start: 2025-06-18

## 2025-07-25 ENCOUNTER — LAB (OUTPATIENT)
Dept: LAB | Age: 89
End: 2025-07-25

## 2025-07-25 DIAGNOSIS — M47.815 OSTEOARTHRITIS OF THORACOLUMBAR SPINE, UNSPECIFIED SPINAL OSTEOARTHRITIS COMPLICATION STATUS: ICD-10-CM

## 2025-07-25 DIAGNOSIS — K21.9 GASTROESOPHAGEAL REFLUX DISEASE, UNSPECIFIED WHETHER ESOPHAGITIS PRESENT: ICD-10-CM

## 2025-07-25 DIAGNOSIS — M85.80 OSTEOPENIA, UNSPECIFIED LOCATION: ICD-10-CM

## 2025-07-25 DIAGNOSIS — J30.1 SEASONAL ALLERGIC RHINITIS DUE TO POLLEN: ICD-10-CM

## 2025-07-25 DIAGNOSIS — G47.33 OSA ON CPAP: ICD-10-CM

## 2025-07-25 DIAGNOSIS — I25.10 CORONARY ARTERY DISEASE INVOLVING NATIVE CORONARY ARTERY OF NATIVE HEART WITHOUT ANGINA PECTORIS: ICD-10-CM

## 2025-07-25 DIAGNOSIS — E34.9 TESTOSTERONE DEFICIENCY: ICD-10-CM

## 2025-07-25 DIAGNOSIS — J44.9 CHRONIC OBSTRUCTIVE PULMONARY DISEASE, UNSPECIFIED COPD TYPE (HCC): ICD-10-CM

## 2025-07-25 DIAGNOSIS — E78.5 HYPERLIPIDEMIA, UNSPECIFIED HYPERLIPIDEMIA TYPE: ICD-10-CM

## 2025-07-25 DIAGNOSIS — I10 ESSENTIAL HYPERTENSION: ICD-10-CM

## 2025-07-25 DIAGNOSIS — E03.9 HYPOTHYROIDISM, UNSPECIFIED TYPE: ICD-10-CM

## 2025-07-25 LAB
ALBUMIN SERPL BCG-MCNC: 3.6 G/DL (ref 3.4–4.9)
ALP SERPL-CCNC: 72 U/L (ref 40–129)
ALT SERPL W/O P-5'-P-CCNC: 18 U/L (ref 10–50)
ANION GAP SERPL CALC-SCNC: 10 MEQ/L (ref 8–16)
AST SERPL-CCNC: 28 U/L (ref 10–50)
BASOPHILS ABSOLUTE: 0.1 THOU/MM3 (ref 0–0.1)
BASOPHILS NFR BLD AUTO: 1.6 %
BILIRUB SERPL-MCNC: 0.4 MG/DL (ref 0.3–1.2)
BUN SERPL-MCNC: 14 MG/DL (ref 8–23)
CALCIUM SERPL-MCNC: 9.2 MG/DL (ref 8.8–10.2)
CHLORIDE SERPL-SCNC: 105 MEQ/L (ref 98–111)
CO2 SERPL-SCNC: 26 MEQ/L (ref 22–29)
CREAT SERPL-MCNC: 1 MG/DL (ref 0.7–1.2)
DEPRECATED RDW RBC AUTO: 43.5 FL (ref 35–45)
EOSINOPHIL NFR BLD AUTO: 8.1 %
EOSINOPHILS ABSOLUTE: 0.4 THOU/MM3 (ref 0–0.4)
ERYTHROCYTE [DISTWIDTH] IN BLOOD BY AUTOMATED COUNT: 12.7 % (ref 11.5–14.5)
GFR SERPL CREATININE-BSD FRML MDRD: 72 ML/MIN/1.73M2
GLUCOSE SERPL-MCNC: 96 MG/DL (ref 74–109)
HCT VFR BLD AUTO: 38 % (ref 42–52)
HGB BLD-MCNC: 12.3 GM/DL (ref 14–18)
IMM GRANULOCYTES # BLD AUTO: 0.01 THOU/MM3 (ref 0–0.07)
IMM GRANULOCYTES NFR BLD AUTO: 0.2 %
LYMPHOCYTES ABSOLUTE: 2.2 THOU/MM3 (ref 1–4.8)
LYMPHOCYTES NFR BLD AUTO: 43.3 %
MCH RBC QN AUTO: 30.6 PG (ref 26–33)
MCHC RBC AUTO-ENTMCNC: 32.4 GM/DL (ref 32.2–35.5)
MCV RBC AUTO: 94.5 FL (ref 80–94)
MONOCYTES ABSOLUTE: 0.6 THOU/MM3 (ref 0.4–1.3)
MONOCYTES NFR BLD AUTO: 11.3 %
NEUTROPHILS ABSOLUTE: 1.8 THOU/MM3 (ref 1.8–7.7)
NEUTROPHILS NFR BLD AUTO: 35.5 %
NRBC BLD AUTO-RTO: 0 /100 WBC
PLATELET # BLD AUTO: 163 THOU/MM3 (ref 130–400)
PMV BLD AUTO: 11 FL (ref 9.4–12.4)
POTASSIUM SERPL-SCNC: 4.5 MEQ/L (ref 3.5–5.2)
PROT SERPL-MCNC: 6.4 G/DL (ref 6.4–8.3)
RBC # BLD AUTO: 4.02 MILL/MM3 (ref 4.7–6.1)
SODIUM SERPL-SCNC: 141 MEQ/L (ref 135–145)
WBC # BLD AUTO: 5 THOU/MM3 (ref 4.8–10.8)

## 2025-07-27 ENCOUNTER — RESULTS FOLLOW-UP (OUTPATIENT)
Dept: FAMILY MEDICINE CLINIC | Age: 89
End: 2025-07-27

## 2025-07-28 NOTE — TELEPHONE ENCOUNTER
----- Message from Dr. Sreedhar Hanna MD sent at 7/27/2025 11:19 AM EDT -----  Notify pt-   Results reviewed.   CBC stable over past 2 years (Previous Anemia Profile and SFOBT negative x 3 in 12/2024)- will continue to monitor.   MCV mildly elevated- have pt add B Complex multivitamin 3x/ week (on M,W,F)   CMP ok  Follow up as scheduled. ES

## 2025-08-26 DIAGNOSIS — M47.815 OSTEOARTHRITIS OF THORACOLUMBAR SPINE, UNSPECIFIED SPINAL OSTEOARTHRITIS COMPLICATION STATUS: ICD-10-CM

## 2025-08-26 RX ORDER — NAPROXEN 500 MG/1
500 TABLET ORAL DAILY
Qty: 90 TABLET | Refills: 1 | Status: SHIPPED | OUTPATIENT
Start: 2025-08-26

## (undated) DEVICE — PACK PROCEDURE SURG PLAS SC MIN SRHP LF

## (undated) DEVICE — GLOVE ORANGE PI 7   MSG9070

## (undated) DEVICE — GOWN,SIRUS,NON REINFRCD,LARGE,SET IN SL: Brand: MEDLINE

## (undated) DEVICE — GLOVE SURG SZ 8 L11.77IN FNGR THK9.8MIL STRW LTX POLYMER

## (undated) DEVICE — BANDAGE,GAUZE,4.5"X4.1YD,STERILE,LF: Brand: MEDLINE

## (undated) DEVICE — SOLUTION IV 1000ML 0.9% SOD CHL PH 5 INJ USP VIAFLX PLAS

## (undated) DEVICE — COTTON BALL ST

## (undated) DEVICE — Device

## (undated) DEVICE — SPONGE GZ W4XL4IN COT 12 PLY TYP VII WVN C FLD DSGN

## (undated) DEVICE — BANDAGE COMPR M W4INXL10YD WHT BGE VELC E MTRX HK AND LOOP

## (undated) DEVICE — SUTURE MONOCRYL SZ 4-0 L18IN ABSRB UD P-3 L13MM 3/8 CIR PRIM Y494G

## (undated) DEVICE — STERILE COTTON BALLS LARGE 5/P: Brand: MEDLINE

## (undated) DEVICE — SOLUTION IRRIG 1500ML STRL H2O USP POUR PLAS BTL

## (undated) DEVICE — CHLORAPREP 26ML CLEAR